# Patient Record
Sex: FEMALE | Race: BLACK OR AFRICAN AMERICAN | NOT HISPANIC OR LATINO | URBAN - METROPOLITAN AREA
[De-identification: names, ages, dates, MRNs, and addresses within clinical notes are randomized per-mention and may not be internally consistent; named-entity substitution may affect disease eponyms.]

---

## 2018-05-03 ENCOUNTER — EMERGENCY (EMERGENCY)
Facility: HOSPITAL | Age: 73
LOS: 1 days | Discharge: HOME | End: 2018-05-03
Attending: EMERGENCY MEDICINE
Payer: MEDICARE

## 2018-05-03 VITALS
HEIGHT: 64 IN | WEIGHT: 195.11 LBS | OXYGEN SATURATION: 98 % | SYSTOLIC BLOOD PRESSURE: 136 MMHG | TEMPERATURE: 98 F | DIASTOLIC BLOOD PRESSURE: 85 MMHG | HEART RATE: 74 BPM | RESPIRATION RATE: 18 BRPM

## 2018-05-03 LAB
ALBUMIN SERPL ELPH-MCNC: 3.3 G/DL — LOW (ref 3.5–5)
ALP SERPL-CCNC: 52 U/L — SIGNIFICANT CHANGE UP (ref 40–120)
ALT FLD-CCNC: 18 U/L DA — SIGNIFICANT CHANGE UP (ref 10–60)
ANION GAP SERPL CALC-SCNC: 5 MMOL/L — SIGNIFICANT CHANGE UP (ref 5–17)
APPEARANCE UR: CLEAR — SIGNIFICANT CHANGE UP
AST SERPL-CCNC: 14 U/L — SIGNIFICANT CHANGE UP (ref 10–40)
BASOPHILS # BLD AUTO: 0.1 K/UL — SIGNIFICANT CHANGE UP (ref 0–0.2)
BASOPHILS NFR BLD AUTO: 1.7 % — SIGNIFICANT CHANGE UP (ref 0–2)
BILIRUB SERPL-MCNC: 0.3 MG/DL — SIGNIFICANT CHANGE UP (ref 0.2–1.2)
BILIRUB UR-MCNC: NEGATIVE — SIGNIFICANT CHANGE UP
BUN SERPL-MCNC: 14 MG/DL — SIGNIFICANT CHANGE UP (ref 7–18)
CALCIUM SERPL-MCNC: 8.3 MG/DL — LOW (ref 8.4–10.5)
CHLORIDE SERPL-SCNC: 110 MMOL/L — HIGH (ref 96–108)
CO2 SERPL-SCNC: 27 MMOL/L — SIGNIFICANT CHANGE UP (ref 22–31)
COLOR SPEC: YELLOW — SIGNIFICANT CHANGE UP
CREAT SERPL-MCNC: 0.85 MG/DL — SIGNIFICANT CHANGE UP (ref 0.5–1.3)
DIFF PNL FLD: NEGATIVE — SIGNIFICANT CHANGE UP
EOSINOPHIL # BLD AUTO: 0.3 K/UL — SIGNIFICANT CHANGE UP (ref 0–0.5)
EOSINOPHIL NFR BLD AUTO: 5.8 % — SIGNIFICANT CHANGE UP (ref 0–6)
GLUCOSE SERPL-MCNC: 99 MG/DL — SIGNIFICANT CHANGE UP (ref 70–99)
GLUCOSE UR QL: NEGATIVE — SIGNIFICANT CHANGE UP
HCT VFR BLD CALC: 36.7 % — SIGNIFICANT CHANGE UP (ref 34.5–45)
HGB BLD-MCNC: 11.4 G/DL — LOW (ref 11.5–15.5)
KETONES UR-MCNC: NEGATIVE — SIGNIFICANT CHANGE UP
LEUKOCYTE ESTERASE UR-ACNC: NEGATIVE — SIGNIFICANT CHANGE UP
LYMPHOCYTES # BLD AUTO: 0.8 K/UL — LOW (ref 1–3.3)
LYMPHOCYTES # BLD AUTO: 18.7 % — SIGNIFICANT CHANGE UP (ref 13–44)
MCHC RBC-ENTMCNC: 29.7 PG — SIGNIFICANT CHANGE UP (ref 27–34)
MCHC RBC-ENTMCNC: 31.2 GM/DL — LOW (ref 32–36)
MCV RBC AUTO: 95.2 FL — SIGNIFICANT CHANGE UP (ref 80–100)
MONOCYTES # BLD AUTO: 0.4 K/UL — SIGNIFICANT CHANGE UP (ref 0–0.9)
MONOCYTES NFR BLD AUTO: 9.4 % — SIGNIFICANT CHANGE UP (ref 2–14)
NEUTROPHILS # BLD AUTO: 2.9 K/UL — SIGNIFICANT CHANGE UP (ref 1.8–7.4)
NEUTROPHILS NFR BLD AUTO: 64.4 % — SIGNIFICANT CHANGE UP (ref 43–77)
NITRITE UR-MCNC: NEGATIVE — SIGNIFICANT CHANGE UP
PH UR: 6 — SIGNIFICANT CHANGE UP (ref 5–8)
PLATELET # BLD AUTO: 310 K/UL — SIGNIFICANT CHANGE UP (ref 150–400)
POTASSIUM SERPL-MCNC: 4.1 MMOL/L — SIGNIFICANT CHANGE UP (ref 3.5–5.3)
POTASSIUM SERPL-SCNC: 4.1 MMOL/L — SIGNIFICANT CHANGE UP (ref 3.5–5.3)
PROT SERPL-MCNC: 7 G/DL — SIGNIFICANT CHANGE UP (ref 6–8.3)
PROT UR-MCNC: NEGATIVE — SIGNIFICANT CHANGE UP
RBC # BLD: 3.85 M/UL — SIGNIFICANT CHANGE UP (ref 3.8–5.2)
RBC # FLD: 13.2 % — SIGNIFICANT CHANGE UP (ref 10.3–14.5)
SODIUM SERPL-SCNC: 142 MMOL/L — SIGNIFICANT CHANGE UP (ref 135–145)
SP GR SPEC: 1.01 — SIGNIFICANT CHANGE UP (ref 1.01–1.02)
UROBILINOGEN FLD QL: NEGATIVE — SIGNIFICANT CHANGE UP
WBC # BLD: 4.5 K/UL — SIGNIFICANT CHANGE UP (ref 3.8–10.5)
WBC # FLD AUTO: 4.5 K/UL — SIGNIFICANT CHANGE UP (ref 3.8–10.5)

## 2018-05-03 PROCEDURE — 96374 THER/PROPH/DIAG INJ IV PUSH: CPT | Mod: XU

## 2018-05-03 PROCEDURE — 99285 EMERGENCY DEPT VISIT HI MDM: CPT | Mod: 25

## 2018-05-03 PROCEDURE — 99284 EMERGENCY DEPT VISIT MOD MDM: CPT | Mod: 25

## 2018-05-03 PROCEDURE — 80053 COMPREHEN METABOLIC PANEL: CPT

## 2018-05-03 PROCEDURE — 74177 CT ABD & PELVIS W/CONTRAST: CPT

## 2018-05-03 PROCEDURE — 85027 COMPLETE CBC AUTOMATED: CPT

## 2018-05-03 PROCEDURE — 81003 URINALYSIS AUTO W/O SCOPE: CPT

## 2018-05-03 PROCEDURE — 74177 CT ABD & PELVIS W/CONTRAST: CPT | Mod: 26

## 2018-05-03 RX ORDER — SODIUM CHLORIDE 9 MG/ML
3 INJECTION INTRAMUSCULAR; INTRAVENOUS; SUBCUTANEOUS ONCE
Qty: 0 | Refills: 0 | Status: COMPLETED | OUTPATIENT
Start: 2018-05-03 | End: 2018-05-03

## 2018-05-03 RX ORDER — MORPHINE SULFATE 50 MG/1
2 CAPSULE, EXTENDED RELEASE ORAL ONCE
Qty: 0 | Refills: 0 | Status: DISCONTINUED | OUTPATIENT
Start: 2018-05-03 | End: 2018-05-03

## 2018-05-03 RX ADMIN — MORPHINE SULFATE 2 MILLIGRAM(S): 50 CAPSULE, EXTENDED RELEASE ORAL at 08:01

## 2018-05-03 RX ADMIN — SODIUM CHLORIDE 3 MILLILITER(S): 9 INJECTION INTRAMUSCULAR; INTRAVENOUS; SUBCUTANEOUS at 07:36

## 2018-05-03 RX ADMIN — MORPHINE SULFATE 2 MILLIGRAM(S): 50 CAPSULE, EXTENDED RELEASE ORAL at 07:37

## 2018-05-03 NOTE — ED PROVIDER NOTE - OBJECTIVE STATEMENT
h/o HTN, arthritis c/o 1 day of rt flank pain throbbing nonradiating worse with movement. Reports similar pain in past with associated diverticulitis on CT. No f/c or change in appetite. no urinary symptoms. No meds taken for pain.

## 2018-05-03 NOTE — ED PROVIDER NOTE - MEDICAL DECISION MAKING DETAILS
rt flank pain with h/o diverticulitis-concern for uti, diverticulitis, musculoskeletal strain-labs, UA, CT a/p reassess

## 2018-09-28 ENCOUNTER — EMERGENCY (EMERGENCY)
Facility: HOSPITAL | Age: 73
LOS: 1 days | Discharge: HOME | End: 2018-09-28
Attending: EMERGENCY MEDICINE
Payer: MEDICARE

## 2018-09-28 VITALS — HEIGHT: 64 IN | WEIGHT: 195.11 LBS

## 2018-09-28 LAB
ALBUMIN SERPL ELPH-MCNC: 3.6 G/DL — SIGNIFICANT CHANGE UP (ref 3.5–5)
ANION GAP SERPL CALC-SCNC: 7 MMOL/L — SIGNIFICANT CHANGE UP (ref 5–17)
APPEARANCE UR: ABNORMAL
APTT BLD: 54.5 SEC — HIGH (ref 27.5–37.4)
BACTERIA # UR AUTO: ABNORMAL /HPF
BASOPHILS # BLD AUTO: 0.1 K/UL — SIGNIFICANT CHANGE UP (ref 0–0.2)
BASOPHILS NFR BLD AUTO: 1.4 % — SIGNIFICANT CHANGE UP (ref 0–2)
BILIRUB UR-MCNC: NEGATIVE — SIGNIFICANT CHANGE UP
BUN SERPL-MCNC: 13 MG/DL — SIGNIFICANT CHANGE UP (ref 7–18)
CALCIUM SERPL-MCNC: 8.7 MG/DL — SIGNIFICANT CHANGE UP (ref 8.4–10.5)
CHLORIDE SERPL-SCNC: 106 MMOL/L — SIGNIFICANT CHANGE UP (ref 96–108)
CO2 SERPL-SCNC: 26 MMOL/L — SIGNIFICANT CHANGE UP (ref 22–31)
COLOR SPEC: YELLOW — SIGNIFICANT CHANGE UP
DIFF PNL FLD: NEGATIVE — SIGNIFICANT CHANGE UP
EOSINOPHIL # BLD AUTO: 0.2 K/UL — SIGNIFICANT CHANGE UP (ref 0–0.5)
EOSINOPHIL NFR BLD AUTO: 2.1 % — SIGNIFICANT CHANGE UP (ref 0–6)
EPI CELLS # UR: ABNORMAL /HPF
GLUCOSE SERPL-MCNC: 109 MG/DL — HIGH (ref 70–99)
GLUCOSE UR QL: NEGATIVE — SIGNIFICANT CHANGE UP
HCT VFR BLD CALC: 36.4 % — SIGNIFICANT CHANGE UP (ref 34.5–45)
HGB BLD-MCNC: 11.8 G/DL — SIGNIFICANT CHANGE UP (ref 11.5–15.5)
INR BLD: 1.16 RATIO — SIGNIFICANT CHANGE UP (ref 0.88–1.16)
KETONES UR-MCNC: NEGATIVE — SIGNIFICANT CHANGE UP
LACTATE SERPL-SCNC: 0.9 MMOL/L — SIGNIFICANT CHANGE UP (ref 0.7–2)
LEUKOCYTE ESTERASE UR-ACNC: ABNORMAL
LYMPHOCYTES # BLD AUTO: 0.9 K/UL — LOW (ref 1–3.3)
LYMPHOCYTES # BLD AUTO: 9.2 % — LOW (ref 13–44)
MCHC RBC-ENTMCNC: 29.5 PG — SIGNIFICANT CHANGE UP (ref 27–34)
MCHC RBC-ENTMCNC: 32.3 GM/DL — SIGNIFICANT CHANGE UP (ref 32–36)
MCV RBC AUTO: 91.5 FL — SIGNIFICANT CHANGE UP (ref 80–100)
MONOCYTES # BLD AUTO: 0.7 K/UL — SIGNIFICANT CHANGE UP (ref 0–0.9)
MONOCYTES NFR BLD AUTO: 8 % — SIGNIFICANT CHANGE UP (ref 2–14)
NEUTROPHILS # BLD AUTO: 7.4 K/UL — SIGNIFICANT CHANGE UP (ref 1.8–7.4)
NEUTROPHILS NFR BLD AUTO: 79.3 % — HIGH (ref 43–77)
NITRITE UR-MCNC: POSITIVE
PH UR: 5 — SIGNIFICANT CHANGE UP (ref 5–8)
PLATELET # BLD AUTO: 324 K/UL — SIGNIFICANT CHANGE UP (ref 150–400)
POTASSIUM SERPL-MCNC: 3.7 MMOL/L — SIGNIFICANT CHANGE UP (ref 3.5–5.3)
POTASSIUM SERPL-SCNC: 3.7 MMOL/L — SIGNIFICANT CHANGE UP (ref 3.5–5.3)
PROT UR-MCNC: NEGATIVE — SIGNIFICANT CHANGE UP
PROTHROM AB SERPL-ACNC: 12.7 SEC — SIGNIFICANT CHANGE UP (ref 9.8–12.7)
RBC # BLD: 3.98 M/UL — SIGNIFICANT CHANGE UP (ref 3.8–5.2)
RBC # FLD: 12.6 % — SIGNIFICANT CHANGE UP (ref 10.3–14.5)
RBC CASTS # UR COMP ASSIST: SIGNIFICANT CHANGE UP /HPF (ref 0–2)
SODIUM SERPL-SCNC: 139 MMOL/L — SIGNIFICANT CHANGE UP (ref 135–145)
SP GR SPEC: 1.01 — SIGNIFICANT CHANGE UP (ref 1.01–1.02)
UROBILINOGEN FLD QL: NEGATIVE — SIGNIFICANT CHANGE UP
WBC # BLD: 9.3 K/UL — SIGNIFICANT CHANGE UP (ref 3.8–10.5)
WBC # FLD AUTO: 9.3 K/UL — SIGNIFICANT CHANGE UP (ref 3.8–10.5)
WBC UR QL: ABNORMAL /HPF (ref 0–5)

## 2018-09-28 PROCEDURE — 99284 EMERGENCY DEPT VISIT MOD MDM: CPT | Mod: 25

## 2018-09-28 RX ORDER — SODIUM CHLORIDE 9 MG/ML
1000 INJECTION INTRAMUSCULAR; INTRAVENOUS; SUBCUTANEOUS ONCE
Qty: 0 | Refills: 0 | Status: COMPLETED | OUTPATIENT
Start: 2018-09-28 | End: 2018-09-28

## 2018-09-28 RX ORDER — ONDANSETRON 8 MG/1
4 TABLET, FILM COATED ORAL ONCE
Qty: 0 | Refills: 0 | Status: COMPLETED | OUTPATIENT
Start: 2018-09-28 | End: 2018-09-28

## 2018-09-28 RX ORDER — MORPHINE SULFATE 50 MG/1
2 CAPSULE, EXTENDED RELEASE ORAL ONCE
Qty: 0 | Refills: 0 | Status: DISCONTINUED | OUTPATIENT
Start: 2018-09-28 | End: 2018-09-28

## 2018-09-28 NOTE — ED PROVIDER NOTE - OBJECTIVE STATEMENT
74 yo F pmh of OA presents with L lumbar back pain, worse with movement, improves with rest, x 2 days, non radiating. Took muscle relaxer with minimal relief. Also c/o a few days of watery non blood diarrhea and lower abd pain that feels like previous episodes of diverticulitis. Denies other acute complaints.

## 2018-09-28 NOTE — ED PROVIDER NOTE - NS ED ROS FT
CONSTITUTIONAL: no fever, no chills   EYES: no visual changes, no eye pain   ENMT: no nasal congestion, no throat pain  CARDIOVASCULAR: no chest pain, no edema, no palpitations   RESPIRATORY: no shortness of breath, no cough   GASTROINTESTINAL: +abdominal pain, +nausea, no vomiting, +diarrhea, no constipation   GENITOURINARY: no dysuria, no frequency  MUSCULOSKELETAL: no joint pains, no myalgias, +back pain   SKIN: no rashes  NEUROLOGICAL: no weakness, no headache, no dizziness, no slurred speech, no syncope   PSYCHIATRIC: no known mental health illness   HEME/LYMPH: no lymphadenopathy      All other ROS negative except as per HPI

## 2018-09-28 NOTE — ED ADULT TRIAGE NOTE - CHIEF COMPLAINT QUOTE
c/o lower abdominal pain x 2 days as per patient she was passing liquid stools x 2 days because she's on liquid diet and after that she has no bm x 2 days

## 2018-09-28 NOTE — ED PROVIDER NOTE - PHYSICAL EXAMINATION
GENERAL: wells appearing, no acute distress   HEAD: atraumatic   EYES: EOMI, pink conjunctiva   ENT: moist oral mucosa   CARDIAC: RRR, no edema, distal pulses present   RESPIRATORY: lungs CTAB, no increased work of breathing   GASTROINTESTINAL: +mild lower abdominal tenderness, no rebound or guarding, bowel sounds presents  GENITOURINARY: no CVA tenderness   MUSCULOSKELETAL: no deformity; L lumbar paraspinal ttp; no midline tenderness or step offs; pain worse with movement   NEUROLOGICAL: AAOx3, CN's II-XII intact, strength 5/5 bilateral UE and LE, sensation intact to light touch, using cane to ambulate    SKIN: intact   PSYCHIATRIC: cooperative  HEME LYMPH: no lymphadenopathy

## 2018-09-28 NOTE — ED PROVIDER NOTE - PROGRESS NOTE DETAILS
CT - uncomplicated diverticulitis  labs - non-contributory   UA - possible UTI (although contaminated specimen)   Pain controlled. Pt ambulatory in ED. Will d/c with GI fu and PO cipro and flagyl. Discussed indications for patient return to ED. Patient understood.

## 2018-09-28 NOTE — ED PROVIDER NOTE - MEDICAL DECISION MAKING DETAILS
72 yo F with hx of diverticulitis p/w lower abd pain w/ diarrhea and with lumbar back pain. Diff dx - diverticulitis, colitis, UTI, MSK back pain. No red flag of back pain. Will check labs, UA, pain control, and CT abd/pelvis.

## 2018-09-29 VITALS
HEART RATE: 75 BPM | SYSTOLIC BLOOD PRESSURE: 112 MMHG | RESPIRATION RATE: 18 BRPM | DIASTOLIC BLOOD PRESSURE: 78 MMHG | TEMPERATURE: 99 F | OXYGEN SATURATION: 99 %

## 2018-09-29 LAB
ALP SERPL-CCNC: 66 U/L — SIGNIFICANT CHANGE UP (ref 40–120)
ALT FLD-CCNC: 17 U/L DA — SIGNIFICANT CHANGE UP (ref 10–60)
AST SERPL-CCNC: 7 U/L — LOW (ref 10–40)
BILIRUB DIRECT SERPL-MCNC: 0.1 MG/DL — SIGNIFICANT CHANGE UP (ref 0–0.2)
BILIRUB INDIRECT FLD-MCNC: 0.5 MG/DL — SIGNIFICANT CHANGE UP (ref 0.2–1)
BILIRUB SERPL-MCNC: 0.6 MG/DL — SIGNIFICANT CHANGE UP (ref 0.2–1.2)
BILIRUB SERPL-MCNC: 0.6 MG/DL — SIGNIFICANT CHANGE UP (ref 0.2–1.2)
CREAT SERPL-MCNC: 0.92 MG/DL — SIGNIFICANT CHANGE UP (ref 0.5–1.3)
LIDOCAIN IGE QN: 84 U/L — SIGNIFICANT CHANGE UP (ref 73–393)
PROT SERPL-MCNC: 8.1 G/DL — SIGNIFICANT CHANGE UP (ref 6–8.3)

## 2018-09-29 PROCEDURE — 36415 COLL VENOUS BLD VENIPUNCTURE: CPT

## 2018-09-29 PROCEDURE — 96375 TX/PRO/DX INJ NEW DRUG ADDON: CPT

## 2018-09-29 PROCEDURE — 96361 HYDRATE IV INFUSION ADD-ON: CPT

## 2018-09-29 PROCEDURE — 87086 URINE CULTURE/COLONY COUNT: CPT

## 2018-09-29 PROCEDURE — 85027 COMPLETE CBC AUTOMATED: CPT

## 2018-09-29 PROCEDURE — 99284 EMERGENCY DEPT VISIT MOD MDM: CPT | Mod: 25

## 2018-09-29 PROCEDURE — 85610 PROTHROMBIN TIME: CPT

## 2018-09-29 PROCEDURE — 82248 BILIRUBIN DIRECT: CPT

## 2018-09-29 PROCEDURE — 86901 BLOOD TYPING SEROLOGIC RH(D): CPT

## 2018-09-29 PROCEDURE — 87186 SC STD MICRODIL/AGAR DIL: CPT

## 2018-09-29 PROCEDURE — 96374 THER/PROPH/DIAG INJ IV PUSH: CPT | Mod: XU

## 2018-09-29 PROCEDURE — 80053 COMPREHEN METABOLIC PANEL: CPT

## 2018-09-29 PROCEDURE — 83605 ASSAY OF LACTIC ACID: CPT

## 2018-09-29 PROCEDURE — 85730 THROMBOPLASTIN TIME PARTIAL: CPT

## 2018-09-29 PROCEDURE — 83690 ASSAY OF LIPASE: CPT

## 2018-09-29 PROCEDURE — 86850 RBC ANTIBODY SCREEN: CPT

## 2018-09-29 PROCEDURE — 86900 BLOOD TYPING SEROLOGIC ABO: CPT

## 2018-09-29 PROCEDURE — 74177 CT ABD & PELVIS W/CONTRAST: CPT

## 2018-09-29 PROCEDURE — 74177 CT ABD & PELVIS W/CONTRAST: CPT | Mod: 26

## 2018-09-29 PROCEDURE — 81001 URINALYSIS AUTO W/SCOPE: CPT

## 2018-09-29 RX ORDER — METRONIDAZOLE 500 MG
1 TABLET ORAL
Qty: 30 | Refills: 0
Start: 2018-09-29 | End: 2018-10-08

## 2018-09-29 RX ORDER — CIPROFLOXACIN LACTATE 400MG/40ML
1 VIAL (ML) INTRAVENOUS
Qty: 20 | Refills: 0
Start: 2018-09-29 | End: 2018-10-08

## 2018-09-29 RX ADMIN — SODIUM CHLORIDE 1000 MILLILITER(S): 9 INJECTION INTRAMUSCULAR; INTRAVENOUS; SUBCUTANEOUS at 00:54

## 2018-09-29 RX ADMIN — MORPHINE SULFATE 2 MILLIGRAM(S): 50 CAPSULE, EXTENDED RELEASE ORAL at 00:53

## 2018-09-29 RX ADMIN — ONDANSETRON 4 MILLIGRAM(S): 8 TABLET, FILM COATED ORAL at 00:53

## 2018-09-29 NOTE — ED ADULT NURSE NOTE - NSIMPLEMENTINTERV_GEN_ALL_ED
Implemented All Universal Safety Interventions:  [place] to call system. Call bell, personal items and telephone within reach. Instruct patient to call for assistance. Room bathroom lighting operational. Non-slip footwear when patient is off stretcher. Physically safe environment: no spills, clutter or unnecessary equipment. Stretcher in lowest position, wheels locked, appropriate side rails in place.

## 2018-09-29 NOTE — ED ADULT NURSE NOTE - OBJECTIVE STATEMENT
Pt states that she is having abd pain for 2 days, and she is passing liquid stool. denies nausea, vomiting fever and chills

## 2019-08-22 ENCOUNTER — EMERGENCY (EMERGENCY)
Facility: HOSPITAL | Age: 74
LOS: 1 days | Discharge: HOME | End: 2019-08-22
Attending: EMERGENCY MEDICINE
Payer: MEDICARE

## 2019-08-22 VITALS
RESPIRATION RATE: 17 BRPM | HEART RATE: 71 BPM | SYSTOLIC BLOOD PRESSURE: 145 MMHG | DIASTOLIC BLOOD PRESSURE: 77 MMHG | OXYGEN SATURATION: 99 % | TEMPERATURE: 98 F

## 2019-08-22 VITALS
OXYGEN SATURATION: 98 % | DIASTOLIC BLOOD PRESSURE: 83 MMHG | HEIGHT: 63 IN | RESPIRATION RATE: 17 BRPM | SYSTOLIC BLOOD PRESSURE: 140 MMHG | WEIGHT: 188.05 LBS | TEMPERATURE: 98 F | HEART RATE: 78 BPM

## 2019-08-22 LAB
ALBUMIN SERPL ELPH-MCNC: 3.7 G/DL — SIGNIFICANT CHANGE UP (ref 3.5–5)
ALP SERPL-CCNC: 68 U/L — SIGNIFICANT CHANGE UP (ref 40–120)
ALT FLD-CCNC: 19 U/L DA — SIGNIFICANT CHANGE UP (ref 10–60)
ANION GAP SERPL CALC-SCNC: 4 MMOL/L — LOW (ref 5–17)
APTT BLD: 46.4 SEC — HIGH (ref 27.5–36.3)
AST SERPL-CCNC: 13 U/L — SIGNIFICANT CHANGE UP (ref 10–40)
BASOPHILS # BLD AUTO: 0.04 K/UL — SIGNIFICANT CHANGE UP (ref 0–0.2)
BASOPHILS NFR BLD AUTO: 0.7 % — SIGNIFICANT CHANGE UP (ref 0–2)
BILIRUB SERPL-MCNC: 0.5 MG/DL — SIGNIFICANT CHANGE UP (ref 0.2–1.2)
BUN SERPL-MCNC: 15 MG/DL — SIGNIFICANT CHANGE UP (ref 7–18)
CALCIUM SERPL-MCNC: 9 MG/DL — SIGNIFICANT CHANGE UP (ref 8.4–10.5)
CHLORIDE SERPL-SCNC: 108 MMOL/L — SIGNIFICANT CHANGE UP (ref 96–108)
CO2 SERPL-SCNC: 31 MMOL/L — SIGNIFICANT CHANGE UP (ref 22–31)
CREAT SERPL-MCNC: 0.85 MG/DL — SIGNIFICANT CHANGE UP (ref 0.5–1.3)
EOSINOPHIL # BLD AUTO: 0.2 K/UL — SIGNIFICANT CHANGE UP (ref 0–0.5)
EOSINOPHIL NFR BLD AUTO: 3.6 % — SIGNIFICANT CHANGE UP (ref 0–6)
GLUCOSE SERPL-MCNC: 94 MG/DL — SIGNIFICANT CHANGE UP (ref 70–99)
HCT VFR BLD CALC: 35.9 % — SIGNIFICANT CHANGE UP (ref 34.5–45)
HGB BLD-MCNC: 11.6 G/DL — SIGNIFICANT CHANGE UP (ref 11.5–15.5)
IMM GRANULOCYTES NFR BLD AUTO: 0.4 % — SIGNIFICANT CHANGE UP (ref 0–1.5)
INR BLD: 1.06 RATIO — SIGNIFICANT CHANGE UP (ref 0.88–1.16)
LACTATE SERPL-SCNC: 0.9 MMOL/L — SIGNIFICANT CHANGE UP (ref 0.7–2)
LYMPHOCYTES # BLD AUTO: 0.96 K/UL — LOW (ref 1–3.3)
LYMPHOCYTES # BLD AUTO: 17.1 % — SIGNIFICANT CHANGE UP (ref 13–44)
MCHC RBC-ENTMCNC: 30 PG — SIGNIFICANT CHANGE UP (ref 27–34)
MCHC RBC-ENTMCNC: 32.3 GM/DL — SIGNIFICANT CHANGE UP (ref 32–36)
MCV RBC AUTO: 92.8 FL — SIGNIFICANT CHANGE UP (ref 80–100)
MONOCYTES # BLD AUTO: 0.52 K/UL — SIGNIFICANT CHANGE UP (ref 0–0.9)
MONOCYTES NFR BLD AUTO: 9.2 % — SIGNIFICANT CHANGE UP (ref 2–14)
NEUTROPHILS # BLD AUTO: 3.89 K/UL — SIGNIFICANT CHANGE UP (ref 1.8–7.4)
NEUTROPHILS NFR BLD AUTO: 69 % — SIGNIFICANT CHANGE UP (ref 43–77)
NRBC # BLD: 0 /100 WBCS — SIGNIFICANT CHANGE UP (ref 0–0)
PLATELET # BLD AUTO: 343 K/UL — SIGNIFICANT CHANGE UP (ref 150–400)
POTASSIUM SERPL-MCNC: 4 MMOL/L — SIGNIFICANT CHANGE UP (ref 3.5–5.3)
POTASSIUM SERPL-SCNC: 4 MMOL/L — SIGNIFICANT CHANGE UP (ref 3.5–5.3)
PROT SERPL-MCNC: 8 G/DL — SIGNIFICANT CHANGE UP (ref 6–8.3)
PROTHROM AB SERPL-ACNC: 11.8 SEC — SIGNIFICANT CHANGE UP (ref 10–12.9)
RBC # BLD: 3.87 M/UL — SIGNIFICANT CHANGE UP (ref 3.8–5.2)
RBC # FLD: 14.1 % — SIGNIFICANT CHANGE UP (ref 10.3–14.5)
SODIUM SERPL-SCNC: 143 MMOL/L — SIGNIFICANT CHANGE UP (ref 135–145)
WBC # BLD: 5.63 K/UL — SIGNIFICANT CHANGE UP (ref 3.8–10.5)
WBC # FLD AUTO: 5.63 K/UL — SIGNIFICANT CHANGE UP (ref 3.8–10.5)

## 2019-08-22 PROCEDURE — 85610 PROTHROMBIN TIME: CPT

## 2019-08-22 PROCEDURE — 86901 BLOOD TYPING SEROLOGIC RH(D): CPT

## 2019-08-22 PROCEDURE — 74174 CTA ABD&PLVS W/CONTRAST: CPT

## 2019-08-22 PROCEDURE — 99284 EMERGENCY DEPT VISIT MOD MDM: CPT | Mod: 25

## 2019-08-22 PROCEDURE — 85027 COMPLETE CBC AUTOMATED: CPT

## 2019-08-22 PROCEDURE — 86850 RBC ANTIBODY SCREEN: CPT

## 2019-08-22 PROCEDURE — 99285 EMERGENCY DEPT VISIT HI MDM: CPT

## 2019-08-22 PROCEDURE — 83605 ASSAY OF LACTIC ACID: CPT

## 2019-08-22 PROCEDURE — 36415 COLL VENOUS BLD VENIPUNCTURE: CPT

## 2019-08-22 PROCEDURE — 85730 THROMBOPLASTIN TIME PARTIAL: CPT

## 2019-08-22 PROCEDURE — 80053 COMPREHEN METABOLIC PANEL: CPT

## 2019-08-22 PROCEDURE — 86900 BLOOD TYPING SEROLOGIC ABO: CPT

## 2019-08-22 PROCEDURE — 74174 CTA ABD&PLVS W/CONTRAST: CPT | Mod: 26

## 2019-08-22 RX ORDER — SODIUM CHLORIDE 9 MG/ML
1000 INJECTION INTRAMUSCULAR; INTRAVENOUS; SUBCUTANEOUS ONCE
Refills: 0 | Status: COMPLETED | OUTPATIENT
Start: 2019-08-22 | End: 2019-08-22

## 2019-08-22 RX ADMIN — SODIUM CHLORIDE 1000 MILLILITER(S): 9 INJECTION INTRAMUSCULAR; INTRAVENOUS; SUBCUTANEOUS at 14:56

## 2019-08-22 NOTE — ED PROVIDER NOTE - OBJECTIVE STATEMENT
73 y/o with PMHx of Arthritis, Cataracts, Diverticulitis and no significant PSHx presenting to ED with sudden onset of rectal bleeding. Pt relates having the urge to pass gas, which prompted her to go to the bathroom. Upon going to the bathroom, pt visualized a small amount of stool and large amount of blood in the toilet bowl. Pt had this occur one more time PTA. Pt denies any other acute complaints. NKDA.

## 2019-08-22 NOTE — ED PROVIDER NOTE - PROGRESS NOTE DETAILS
labs and CT normal. patient has no more episodes of bleeding in Emergency Department. home with GI followup

## 2019-08-23 ENCOUNTER — EMERGENCY (EMERGENCY)
Facility: HOSPITAL | Age: 74
LOS: 1 days | Discharge: HOME | End: 2019-08-23
Attending: EMERGENCY MEDICINE | Admitting: EMERGENCY MEDICINE
Payer: MEDICARE

## 2019-08-23 VITALS
TEMPERATURE: 98 F | HEART RATE: 86 BPM | RESPIRATION RATE: 16 BRPM | SYSTOLIC BLOOD PRESSURE: 151 MMHG | OXYGEN SATURATION: 100 % | DIASTOLIC BLOOD PRESSURE: 69 MMHG

## 2019-08-23 VITALS
DIASTOLIC BLOOD PRESSURE: 79 MMHG | RESPIRATION RATE: 16 BRPM | SYSTOLIC BLOOD PRESSURE: 137 MMHG | OXYGEN SATURATION: 100 % | HEART RATE: 83 BPM

## 2019-08-23 PROCEDURE — 99283 EMERGENCY DEPT VISIT LOW MDM: CPT | Mod: GC

## 2019-08-23 NOTE — ED PROVIDER NOTE - CARE PROVIDER_API CALL
Esdras Rooney (DO)  Gastroenterology; Internal Medicine  [address] [place] [address]  Phone: [phone]  Fax: [phone]  Follow Up Time: 4-6 Days

## 2019-08-23 NOTE — ED ADULT NURSE NOTE - NSIMPLEMENTINTERV_GEN_ALL_ED
Implemented All Universal Safety Interventions:  [place] to call system. Call bell, personal items and telephone within reach. Instruct patient to call for assistance. Room bathroom lighting operational. Non-slip footwear when patient is off stretcher. Physically safe environment: no spills, clutter or unnecessary equipment. Stretcher in lowest position, wheels locked, appropriate side rails in place.

## 2019-08-23 NOTE — ED ADULT TRIAGE NOTE - CHIEF COMPLAINT QUOTE
states had 1 episode of bright red blood rectal bleeding with nausea yesterday morning and another episode of bloody stools this morning. Pt was seen at [facility] yesterday and had tests done. Pt denies taking anticoagulants. Hx diverticulitis

## 2019-08-23 NOTE — ED PROVIDER NOTE - ATTENDING CONTRIBUTION TO CARE
74 Y F with F with hx of diverticulosis here with rectal bleeding x 2 days. Pt states that she went to  ED yesterday for the same issue, had labs and normal CT AP and was DCed home. Pt states that yesterday had 1 episode of bright red blood in the toilet with BM, after this she had several normal BMs without blood. Today she notes that her first BM of the day she was the same bright red blood  in the toilet but then another normal one after that. She denies weakness, LOC, SOB, chest pain. She has never had bleeding like this before, her last colon was 5 or so years ago and she said that it only showed diverticula. She denies abdominal pain, nausea, vomiting, rectal pain. On exam: VSS lungs, heart, pulses, abdomen, neuro, extremities, skin, all normal on exam. repeat H/H 11.6/36.4. VS remain stable, no current bleeding. No need for emergency admission. Urgent timely GI follow up facilitated. Pt will RTER if worse

## 2019-08-23 NOTE — ED ADULT NURSE NOTE - CHIEF COMPLAINT QUOTE
states had 1 episode of bright red blood rectal bleeding with nausea yesterday morning and another episode of bloody stools this morning. Pt was seen at [facility] yesterday and had tests done. Pt denies taking anticoagulants. Hx diverticulitis

## 2019-08-23 NOTE — ED PROVIDER NOTE - CLINICAL SUMMARY MEDICAL DECISION MAKING FREE TEXT BOX
74 Y F with no pmhx not on blood thinners off and on BRBPR x 2 days without symptoms had  ED workup yesterday without findings. Will get repeat blood work, if stable HG likely DC with GI FU and return precautions.

## 2019-08-23 NOTE — ED PROVIDER NOTE - NSFOLLOWUPINSTRUCTIONS_ED_ALL_ED_FT
If you have any severe increase in pain, fever, uncontrollable nausea vomiting, or inability to tolerate eating and drinking you need to come right back to the emergency room.     We will give you a local GI doctors number that sees ER patients very quickly. Call his office today and see if you can get in to see him next week. If you have any severe increase in bleeding, passing out, or feeling weak come right back. We will print all of your results so that you can take them with you to your appointment.

## 2019-08-23 NOTE — ED PROVIDER NOTE - PMH
Was the patient seen in the last year in this department? Yes     Pt was seen 1/29/2018    Does patient have an active prescription for medications requested? No     Received Request Via: Patient     Arthritis    Cataract    Diverticulitis of intestine

## 2019-08-23 NOTE — ED PROVIDER NOTE - OBJECTIVE STATEMENT
74 Y F with F with hx of diverticula here with rectal bleeding x 2 days. Pt states that she went to  ED yesterday for the same issue, had labs and normal CT AP and was DCed home. Pt states that yesterday had 1 episode of bright red blood in the toilet with BM, after this she had several normal BMs without blood. Today she notes that her first BM of the day she was the same bright red blood  in the toilet but then another normal one after that. She denies weakness, LOC, SOB, chest pain. She has never had bleeding like this before, her last colon was 5 or so years ago and she said that it only showed diverticula. She denies abdominal pain, nausea, vomiting, rectal pain. 74 Y F with F with hx of diverticulosis here with rectal bleeding x 2 days. Pt states that she went to  ED yesterday for the same issue, had labs and normal CT AP and was DCed home. Pt states that yesterday had 1 episode of bright red blood in the toilet with BM, after this she had several normal BMs without blood. Today she notes that her first BM of the day she was the same bright red blood  in the toilet but then another normal one after that. She denies weakness, LOC, SOB, chest pain. She has never had bleeding like this before, her last colon was 5 or so years ago and she said that it only showed diverticula. She denies abdominal pain, nausea, vomiting, rectal pain.

## 2019-08-23 NOTE — ED ADULT NURSE NOTE - OBJECTIVE STATEMENT
Pt presenting to room 3 AxOx4 ambulatory at baseline with c/o rectal bleeding x2 days. No significant PMH noted. Pt states she went to [facility] for rectal bleeding, was discharged after CT and blood work came back normal. Pt also c/o "pelvic pain", and an "uneasy feeling" in her abdomen radiating to her throat. On arrival to ED pt's breathing is even and unlabored, pt satting well on RA. Pt appears comfortable, denies CP, SOB, dizziness, lightheadedness, weakness, abdominal pain, N/V, urinary symptoms. No active bleeding noted from rectum at the moment. Skin dry and intact. IV established with 20g in RAC, labs drawn and sent, VSS and as noted, MD at bedside, will continue to monitor.

## 2019-10-16 ENCOUNTER — EMERGENCY (EMERGENCY)
Facility: HOSPITAL | Age: 74
LOS: 1 days | Discharge: HOME | End: 2019-10-16
Attending: EMERGENCY MEDICINE | Admitting: EMERGENCY MEDICINE
Payer: MEDICARE

## 2019-10-16 VITALS
DIASTOLIC BLOOD PRESSURE: 75 MMHG | TEMPERATURE: 98 F | HEART RATE: 70 BPM | RESPIRATION RATE: 16 BRPM | SYSTOLIC BLOOD PRESSURE: 159 MMHG | OXYGEN SATURATION: 98 %

## 2019-10-16 LAB
ANION GAP SERPL CALC-SCNC: 10 MMO/L — SIGNIFICANT CHANGE UP (ref 7–14)
BUN SERPL-MCNC: 11 MG/DL — SIGNIFICANT CHANGE UP (ref 7–23)
CALCIUM SERPL-MCNC: 9.2 MG/DL — SIGNIFICANT CHANGE UP (ref 8.4–10.5)
CHLORIDE SERPL-SCNC: 106 MMOL/L — SIGNIFICANT CHANGE UP (ref 98–107)
CO2 SERPL-SCNC: 26 MMOL/L — SIGNIFICANT CHANGE UP (ref 22–31)
CREAT SERPL-MCNC: 0.78 MG/DL — SIGNIFICANT CHANGE UP (ref 0.5–1.3)
GLUCOSE SERPL-MCNC: 114 MG/DL — HIGH (ref 70–99)
MAGNESIUM SERPL-MCNC: 2 MG/DL — SIGNIFICANT CHANGE UP (ref 1.6–2.6)
PHOSPHATE SERPL-MCNC: 3 MG/DL — SIGNIFICANT CHANGE UP (ref 2.5–4.5)
POTASSIUM SERPL-MCNC: 4.2 MMOL/L — SIGNIFICANT CHANGE UP (ref 3.5–5.3)
POTASSIUM SERPL-SCNC: 4.2 MMOL/L — SIGNIFICANT CHANGE UP (ref 3.5–5.3)
SODIUM SERPL-SCNC: 142 MMOL/L — SIGNIFICANT CHANGE UP (ref 135–145)

## 2019-10-16 PROCEDURE — 99283 EMERGENCY DEPT VISIT LOW MDM: CPT

## 2019-10-16 RX ORDER — ACETAMINOPHEN 500 MG
650 TABLET ORAL ONCE
Refills: 0 | Status: COMPLETED | OUTPATIENT
Start: 2019-10-16 | End: 2019-10-16

## 2019-10-16 RX ORDER — IBUPROFEN 200 MG
600 TABLET ORAL ONCE
Refills: 0 | Status: COMPLETED | OUTPATIENT
Start: 2019-10-16 | End: 2019-10-16

## 2019-10-16 RX ADMIN — Medication 600 MILLIGRAM(S): at 12:40

## 2019-10-16 RX ADMIN — Medication 650 MILLIGRAM(S): at 10:22

## 2019-10-16 RX ADMIN — Medication 600 MILLIGRAM(S): at 11:46

## 2019-10-16 NOTE — ED PROVIDER NOTE - CLINICAL SUMMARY MEDICAL DECISION MAKING FREE TEXT BOX
Pt is a 75 y/o F who presents to the ED with chronic hand tingling with numbness type pain and bilateral shoulder pain, both worse with movement. Possible electrolyte abnormality vs. neuropathic pain. Will get EKG and BMP, give Tylenol. If all studies WNL, will give neuro referral.

## 2019-10-16 NOTE — ED PROVIDER NOTE - PATIENT PORTAL LINK FT
You can access the FollowMyHealth Patient Portal offered by [facility] by registering at the following website: [email or web address]. By joining [facility]’s FollowMyHealth portal, you will also be able to view your health information using other applications (apps) compatible with our system.

## 2019-10-16 NOTE — ED PROVIDER NOTE - OBJECTIVE STATEMENT
Pt is a 75 y/o F with PMHx of diverticulosis who presents to the ED with 6 months of tingling and numbness type pain to her bilateral elbows and fingertips, worse with use of her hands. Also c/o 2 years of B/L shoulder pain, worse with movement. She does have a known left rotator cuff tear. No hx of trauma or falls before symptoms started. No weakness in extremities or tingling in lower extremity. No CP, SOB, or any known cardiac disease.

## 2019-10-16 NOTE — ED PROVIDER NOTE - MUSCULOSKELETAL, MLM
Generalized tenderness and decreased ROM to bilateral shoulders. Tenderness to bilateral fingertips. Normal  strength.

## 2019-10-16 NOTE — ED PROVIDER NOTE - NSFOLLOWUPINSTRUCTIONS_ED_ALL_ED_FT
You are discharged to go home  Please return to the Emergency Room if your symptoms change or worsen    Please follow up with: a neurologist for your symptoms

## 2019-12-23 ENCOUNTER — APPOINTMENT (OUTPATIENT)
Dept: NEUROLOGY | Facility: CLINIC | Age: 74
End: 2019-12-23

## 2020-01-17 ENCOUNTER — APPOINTMENT (OUTPATIENT)
Dept: NEUROLOGY | Facility: CLINIC | Age: 75
End: 2020-01-17

## 2020-02-11 ENCOUNTER — APPOINTMENT (OUTPATIENT)
Dept: NEUROLOGY | Facility: CLINIC | Age: 75
End: 2020-02-11
Payer: MEDICARE

## 2020-02-11 VITALS
HEIGHT: 65 IN | SYSTOLIC BLOOD PRESSURE: 185 MMHG | TEMPERATURE: 98.1 F | RESPIRATION RATE: 17 BRPM | DIASTOLIC BLOOD PRESSURE: 106 MMHG | BODY MASS INDEX: 32.99 KG/M2 | OXYGEN SATURATION: 96 % | HEART RATE: 77 BPM | WEIGHT: 198 LBS

## 2020-02-11 DIAGNOSIS — M54.30 SCIATICA, UNSPECIFIED SIDE: ICD-10-CM

## 2020-02-11 DIAGNOSIS — I65.29 OCCLUSION AND STENOSIS OF UNSPECIFIED CAROTID ARTERY: ICD-10-CM

## 2020-02-11 DIAGNOSIS — R20.0 ANESTHESIA OF SKIN: ICD-10-CM

## 2020-02-11 DIAGNOSIS — G56.00 CARPAL TUNNEL SYNDROME, UNSPECIFIED UPPER LIMB: ICD-10-CM

## 2020-02-11 LAB
ANION GAP SERPL CALC-SCNC: 11 MMOL/L
BUN SERPL-MCNC: 17 MG/DL
CALCIUM SERPL-MCNC: 9.3 MG/DL
CHLORIDE SERPL-SCNC: 106 MMOL/L
CO2 SERPL-SCNC: 27 MMOL/L
CREAT SERPL-MCNC: 0.89 MG/DL
GLUCOSE SERPL-MCNC: 98 MG/DL
POTASSIUM SERPL-SCNC: 4.1 MMOL/L
SODIUM SERPL-SCNC: 144 MMOL/L

## 2020-02-11 PROCEDURE — 99205 OFFICE O/P NEW HI 60 MIN: CPT

## 2020-02-11 NOTE — HISTORY OF PRESENT ILLNESS
[FreeTextEntry1] : Ms. Montanez is a 74 year old woman who reports that in October 2019 she started having bilateral hand numbness. 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 I reviewed all the imaging and there may be mild fusiform enlargement of the right cavernous segment, but it does not appear out of proportion to what would be expected for her age. She was told that she has carpal tunnel syndrome and a pinched nerve causing her numbness, and regardless, the MR angiographic finding is asymptomatic. There also may be stenosis at the cervical petrous junction. She denies any TIA/Stroke symptoms.

## 2020-02-11 NOTE — PHYSICAL EXAM
[General Appearance - Well Developed] : well developed [General Appearance - Alert] : alert [General Appearance - Well Nourished] : well nourished [Affect] : the affect was normal [Oriented To Time, Place, And Person] : oriented to person, place, and time [Place] : oriented to place [Mood] : the mood was normal [Person] : oriented to person [Time] : oriented to time [Concentration Intact] : normal concentrating ability [Short Term Intact] : short term memory intact [Repeating Phrases] : no difficulty repeating a phrase [Visual Intact] : visual attention was ~T not ~L decreased [Naming Objects] : no difficulty naming common objects [Fluency] : fluency intact [Writing A Sentence] : no difficulty writing a sentence [Comprehension] : comprehension intact [Cranial Nerves Optic (II)] : visual acuity intact bilaterally,  visual fields full to confrontation, pupils equal round and reactive to light [Past History] : adequate knowledge of personal past history [Reading] : reading intact [Cranial Nerves Oculomotor (III)] : extraocular motion intact [Cranial Nerves Vestibulocochlear (VIII)] : hearing was intact bilaterally [Cranial Nerves Facial (VII)] : face symmetrical [Cranial Nerves Trigeminal (V)] : facial sensation intact symmetrically [Cranial Nerves Accessory (XI - Cranial And Spinal)] : head turning and shoulder shrug symmetric [Cranial Nerves Glossopharyngeal (IX)] : tongue and palate midline [Cranial Nerves Hypoglossal (XII)] : there was no tongue deviation with protrusion [Motor Tone] : muscle tone was normal in all four extremities [Motor Strength] : muscle strength was normal in all four extremities [Motor Handedness Right-Handed] : the patient is right hand dominant [No Muscle Atrophy] : normal bulk in all four extremities [Balance] : balance was intact [Sensation Tactile Decrease] : light touch was intact [Extraocular Movements] : extraocular movements were intact [Hearing Threshold Finger Rub Not [place]] : hearing was normal [Full Visual Field] : full visual field [Neck Appearance] : the appearance of the neck was normal [] : no respiratory distress [Abdomen Soft] : soft [Heart Rate And Rhythm] : heart rate was normal and rhythm regular [Edema] : there was no peripheral edema [Abnormal Walk] : normal gait [Musculoskeletal - Swelling] : no joint swelling seen [Skin Turgor] : normal skin turgor [Skin Color & Pigmentation] : normal skin color and pigmentation [Paresis Pronator Drift Right-Sided] : no pronator drift on the right [Paresis Pronator Drift Left-Sided] : no pronator drift on the left [Motor Strength Upper Extremities Bilaterally] : strength was normal in both upper extremities [Motor Strength Lower Extremities Bilaterally] : strength was normal in both lower extremities [Dysdiadochokinesia Bilaterally] : not present [Coordination - Dysmetria Impaired Finger-to-Nose Bilateral] : not present

## 2020-02-11 NOTE — REVIEW OF SYSTEMS
[Numbness] : numbness [Fever] : no fever [Chills] : no chills [Feeling Poorly] : not feeling poorly [Feeling Tired] : not feeling tired [Memory Lapses or Loss] : no memory loss [Confused or Disoriented] : no confusion [Decr. Concentrating Ability] : no decrease in concentrating ability [Difficulty with Language] : no ~M difficulty with language [Repeating Questions] : no repeated questioning about recent events [Changed Thought Patterns] : no change in thought patterns [Facial Weakness] : no facial weakness [Arm Weakness] : no arm weakness [Hand Weakness] : no hand weakness [Leg Weakness] : no leg weakness [Poor Coordination] : good coordination [Difficulty Writing] : no difficulty writing [Tingling] : no tingling [Seizures] : no convulsions [Dizziness] : no dizziness [Fainting] : no fainting [Lightheadedness] : no lightheadedness [Vertigo] : no vertigo [Tension Headache] : no tension-type headache [Difficulty Walking] : no difficulty walking [Inability to Walk] : able to walk [Ataxia] : no ataxia [Frequent Falls] : not falling [Anxiety] : no anxiety [Depression] : no depression [Eye Pain] : no eye pain [Red Eyes] : eyes not red [Earache] : no earache [Loss Of Hearing] : no hearing loss [Chest Pain] : no chest pain [Palpitations] : no palpitations [Shortness Of Breath] : no shortness of breath [Wheezing] : no wheezing [Cough] : no cough [Vomiting] : no vomiting [Joint Pain] : no joint pain [Skin Wound] : no skin wound [Skin Lesions] : no skin lesions [Easy Bleeding] : no tendency for easy bleeding [Easy Bruising] : no tendency for easy bruising

## 2020-02-11 NOTE — DISCUSSION/SUMMARY
[FreeTextEntry1] : Ms. Montanez is a 74 year old woman who reports in October 2019 she started having bilateral hand numbness. 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 Plan for a CTA head and neck to better visualize the fusiform enlargement of the right cavernous segment and the possible stenosis at the cervical petrous junction. She will continue to follow with LOUANN Crespo for the hand numbness. I will see her again after the imaging. All of her questions and concerns were addressed.

## 2020-02-11 NOTE — CONSULT LETTER
[Consult Letter:] : I had the pleasure of evaluating your patient, [unfilled]. [( Thank you for referring [unfilled] for consultation for _____ )] : Thank you for referring [unfilled] for consultation for [unfilled] [Dear  ___] : Dear ~JUAREZ, [Please see my note below.] : Please see my note below. [Sincerely,] : Sincerely, [Consult Closing:] : Thank you very much for allowing me to participate in the care of this patient.  If you have any questions, please do not hesitate to contact me. [FreeTextEntry3] : Payam James MD\par Chief, Vascular Neurology\par  of Neurology and Radiology\par [facility] [employer] [facility]\par Director, Comprehensive Stroke Center and Stroke Unit\par [facility]\par Attending, Interventional Neuroradiology\par \par

## 2021-07-25 ENCOUNTER — EMERGENCY (EMERGENCY)
Facility: HOSPITAL | Age: 76
LOS: 1 days | Discharge: HOME | End: 2021-07-25
Attending: STUDENT IN AN ORGANIZED HEALTH CARE EDUCATION/TRAINING PROGRAM
Payer: MEDICARE

## 2021-07-25 VITALS
HEART RATE: 58 BPM | RESPIRATION RATE: 18 BRPM | SYSTOLIC BLOOD PRESSURE: 133 MMHG | DIASTOLIC BLOOD PRESSURE: 87 MMHG | TEMPERATURE: 98 F | OXYGEN SATURATION: 98 %

## 2021-07-25 VITALS
WEIGHT: 185.19 LBS | HEIGHT: 63 IN | HEART RATE: 70 BPM | OXYGEN SATURATION: 98 % | DIASTOLIC BLOOD PRESSURE: 82 MMHG | RESPIRATION RATE: 18 BRPM | TEMPERATURE: 98 F | SYSTOLIC BLOOD PRESSURE: 161 MMHG

## 2021-07-25 LAB
ALBUMIN SERPL ELPH-MCNC: 3.6 G/DL — SIGNIFICANT CHANGE UP (ref 3.5–5)
ALP SERPL-CCNC: 62 U/L — SIGNIFICANT CHANGE UP (ref 40–120)
ALT FLD-CCNC: 22 U/L DA — SIGNIFICANT CHANGE UP (ref 10–60)
ANION GAP SERPL CALC-SCNC: 8 MMOL/L — SIGNIFICANT CHANGE UP (ref 5–17)
APPEARANCE UR: CLEAR — SIGNIFICANT CHANGE UP
AST SERPL-CCNC: 22 U/L — SIGNIFICANT CHANGE UP (ref 10–40)
BACTERIA # UR AUTO: ABNORMAL /HPF
BASOPHILS # BLD AUTO: 0.04 K/UL — SIGNIFICANT CHANGE UP (ref 0–0.2)
BASOPHILS NFR BLD AUTO: 0.7 % — SIGNIFICANT CHANGE UP (ref 0–2)
BILIRUB SERPL-MCNC: 0.8 MG/DL — SIGNIFICANT CHANGE UP (ref 0.2–1.2)
BILIRUB UR-MCNC: NEGATIVE — SIGNIFICANT CHANGE UP
BUN SERPL-MCNC: 15 MG/DL — SIGNIFICANT CHANGE UP (ref 7–18)
CALCIUM SERPL-MCNC: 8.9 MG/DL — SIGNIFICANT CHANGE UP (ref 8.4–10.5)
CHLORIDE SERPL-SCNC: 110 MMOL/L — HIGH (ref 96–108)
CO2 SERPL-SCNC: 25 MMOL/L — SIGNIFICANT CHANGE UP (ref 22–31)
COLOR SPEC: YELLOW — SIGNIFICANT CHANGE UP
CREAT SERPL-MCNC: 0.86 MG/DL — SIGNIFICANT CHANGE UP (ref 0.5–1.3)
DIFF PNL FLD: NEGATIVE — SIGNIFICANT CHANGE UP
EOSINOPHIL # BLD AUTO: 0.14 K/UL — SIGNIFICANT CHANGE UP (ref 0–0.5)
EOSINOPHIL NFR BLD AUTO: 2.4 % — SIGNIFICANT CHANGE UP (ref 0–6)
EPI CELLS # UR: SIGNIFICANT CHANGE UP /HPF
GLUCOSE SERPL-MCNC: 86 MG/DL — SIGNIFICANT CHANGE UP (ref 70–99)
GLUCOSE UR QL: NEGATIVE — SIGNIFICANT CHANGE UP
HCT VFR BLD CALC: 36.2 % — SIGNIFICANT CHANGE UP (ref 34.5–45)
HGB BLD-MCNC: 11.9 G/DL — SIGNIFICANT CHANGE UP (ref 11.5–15.5)
IMM GRANULOCYTES NFR BLD AUTO: 0.2 % — SIGNIFICANT CHANGE UP (ref 0–1.5)
KETONES UR-MCNC: ABNORMAL
LEUKOCYTE ESTERASE UR-ACNC: ABNORMAL
LYMPHOCYTES # BLD AUTO: 0.87 K/UL — LOW (ref 1–3.3)
LYMPHOCYTES # BLD AUTO: 15.2 % — SIGNIFICANT CHANGE UP (ref 13–44)
MCHC RBC-ENTMCNC: 29.6 PG — SIGNIFICANT CHANGE UP (ref 27–34)
MCHC RBC-ENTMCNC: 32.9 GM/DL — SIGNIFICANT CHANGE UP (ref 32–36)
MCV RBC AUTO: 90 FL — SIGNIFICANT CHANGE UP (ref 80–100)
MONOCYTES # BLD AUTO: 0.61 K/UL — SIGNIFICANT CHANGE UP (ref 0–0.9)
MONOCYTES NFR BLD AUTO: 10.6 % — SIGNIFICANT CHANGE UP (ref 2–14)
NEUTROPHILS # BLD AUTO: 4.06 K/UL — SIGNIFICANT CHANGE UP (ref 1.8–7.4)
NEUTROPHILS NFR BLD AUTO: 70.9 % — SIGNIFICANT CHANGE UP (ref 43–77)
NITRITE UR-MCNC: NEGATIVE — SIGNIFICANT CHANGE UP
NRBC # BLD: 0 /100 WBCS — SIGNIFICANT CHANGE UP (ref 0–0)
PH UR: 6 — SIGNIFICANT CHANGE UP (ref 5–8)
PLATELET # BLD AUTO: 353 K/UL — SIGNIFICANT CHANGE UP (ref 150–400)
POTASSIUM SERPL-MCNC: 4.3 MMOL/L — SIGNIFICANT CHANGE UP (ref 3.5–5.3)
POTASSIUM SERPL-SCNC: 4.3 MMOL/L — SIGNIFICANT CHANGE UP (ref 3.5–5.3)
PROT SERPL-MCNC: 8 G/DL — SIGNIFICANT CHANGE UP (ref 6–8.3)
PROT UR-MCNC: NEGATIVE — SIGNIFICANT CHANGE UP
RBC # BLD: 4.02 M/UL — SIGNIFICANT CHANGE UP (ref 3.8–5.2)
RBC # FLD: 13.8 % — SIGNIFICANT CHANGE UP (ref 10.3–14.5)
RBC CASTS # UR COMP ASSIST: SIGNIFICANT CHANGE UP /HPF (ref 0–2)
SODIUM SERPL-SCNC: 143 MMOL/L — SIGNIFICANT CHANGE UP (ref 135–145)
SP GR SPEC: 1.01 — SIGNIFICANT CHANGE UP (ref 1.01–1.02)
UROBILINOGEN FLD QL: NEGATIVE — SIGNIFICANT CHANGE UP
WBC # BLD: 5.73 K/UL — SIGNIFICANT CHANGE UP (ref 3.8–10.5)
WBC # FLD AUTO: 5.73 K/UL — SIGNIFICANT CHANGE UP (ref 3.8–10.5)
WBC UR QL: SIGNIFICANT CHANGE UP /HPF (ref 0–5)

## 2021-07-25 PROCEDURE — 99284 EMERGENCY DEPT VISIT MOD MDM: CPT

## 2021-07-25 RX ORDER — MORPHINE SULFATE 50 MG/1
2 CAPSULE, EXTENDED RELEASE ORAL ONCE
Refills: 0 | Status: DISCONTINUED | OUTPATIENT
Start: 2021-07-25 | End: 2021-07-25

## 2021-07-25 RX ORDER — KETOROLAC TROMETHAMINE 30 MG/ML
15 SYRINGE (ML) INJECTION ONCE
Refills: 0 | Status: DISCONTINUED | OUTPATIENT
Start: 2021-07-25 | End: 2021-07-25

## 2021-07-25 RX ORDER — SODIUM CHLORIDE 9 MG/ML
1000 INJECTION INTRAMUSCULAR; INTRAVENOUS; SUBCUTANEOUS ONCE
Refills: 0 | Status: COMPLETED | OUTPATIENT
Start: 2021-07-25 | End: 2021-07-25

## 2021-07-25 RX ADMIN — SODIUM CHLORIDE 1000 MILLILITER(S): 9 INJECTION INTRAMUSCULAR; INTRAVENOUS; SUBCUTANEOUS at 22:24

## 2021-07-25 RX ADMIN — Medication 15 MILLIGRAM(S): at 22:24

## 2021-07-25 RX ADMIN — MORPHINE SULFATE 2 MILLIGRAM(S): 50 CAPSULE, EXTENDED RELEASE ORAL at 22:24

## 2021-07-25 NOTE — ED PROVIDER NOTE - NSFOLLOWUPINSTRUCTIONS_ED_ALL_ED_FT
Return to ER immediately if your abdominal pain does not improve, worsens, or persists, if you have fever, vomiting,  blood in stools or you have black stools, unable to eat or drink, have worsening/persistent diarrhea or constipation, any concerns. See your doctor as soon as possible (within 1-2 days).   If you need further assistance for appointments you can contact the [facility] Care Coordinator at [phone]. In addition our outpatient [facility] is located at [address] [place] [address], tele: [phone].

## 2021-07-25 NOTE — ED PROVIDER NOTE - PROGRESS NOTE DETAILS
[place] DO: Received sign out from Dr. Stout, f/u CT if no acute pathology can dc with f/u. MPRESSION:  No acute findings on CT of the abdomen and pelvis to explain patient's clinical symptoms.  Pancolonic diverticulosis without evidence of diverticulitis.  Pt is well appearing, no guarding to repeat abdominal palpation, able to eat and drink without vomiting. Pt will f/u with GI-contact provided.   Pt is well appearing, has no new complaints and able to walk with normal gait. Pt is stable for discharge and follow up with medical doctor. Pt educated on care and need for follow up. Discussed anticipatory guidance and return precautions. Questions answered. I had a detailed discussion with the patient and/or guardian regarding the historical points, exam findings, and any diagnostic results supporting the discharge diagnosis.

## 2021-07-25 NOTE — ED PROVIDER NOTE - PATIENT PORTAL LINK FT
You can access the FollowMyHealth Patient Portal offered by [facility] by registering at the following website: [email or web address]. By joining [facility]’s FollowMyHealth portal, you will also be able to view your health information using other applications (apps) compatible with our system.

## 2021-07-25 NOTE — ED PROVIDER NOTE - CLINICAL SUMMARY MEDICAL DECISION MAKING FREE TEXT BOX
77 y/o F presents with R flank wrapping around to abdomen pain. Kidney stones vs pyelo. No RUE tenderness, negative Diaz's, and no rash. Will perform labs, urine and reassess.

## 2021-07-25 NOTE — ED PROVIDER NOTE - NSFOLLOWUPCLINICS_GEN_ALL_ED_FT
[facility]  Gastroenterology  [address] [place] [address]  Phone: [phone]  Fax: [phone]    [facility]  Internal Medicine  [address] [place] [address]  Phone: [phone]  Fax: [phone]

## 2021-07-25 NOTE — ED PROVIDER NOTE - PHYSICAL EXAMINATION
Sohrawardy:   VS reviewed  NAD, not ill-appearing  aaox3  nc/at, neck rom normal, supple  rrr, s1s2, no jvd, no pitting edema  normal resp effort  lungs ctab, no w/r/r, no CVA tenderness  abdomen soft, nd/nt   no rash  cn intact, strength 5/5, sensation intact, no neuro deficits

## 2021-07-25 NOTE — ED PROVIDER NOTE - NS ED ROS FT
(+) flank pain, abdominal pain  (-) fevers, chills  (-) dizziness, lightheadedness, vision changes  (-) neck pain, stiffness  (-) cp, palpitations  (-) sob, cough, hemoptysis  (-) n/v/d/c   (-) urinary sxs, back pain  (-) weakness, paresthesias

## 2021-07-25 NOTE — ED PROVIDER NOTE - OBJECTIVE STATEMENT
77 y/o F with a significant PMHx of HTN presents to the ED with R sided pain. Patient is endorsing R flank wrapping around to the abdomen aching, intermittently sharp pain starting this morning. Pain not associated with movement, eating or urinating. No history of kidney stones. Denies fever, chills, nausea, vomiting, chest pain, shortness of breath or any other acute complaints.

## 2021-07-26 PROCEDURE — 96375 TX/PRO/DX INJ NEW DRUG ADDON: CPT

## 2021-07-26 PROCEDURE — 96374 THER/PROPH/DIAG INJ IV PUSH: CPT | Mod: XU

## 2021-07-26 PROCEDURE — 74177 CT ABD & PELVIS W/CONTRAST: CPT | Mod: 26,MA

## 2021-07-26 PROCEDURE — 74177 CT ABD & PELVIS W/CONTRAST: CPT | Mod: MA

## 2021-07-26 PROCEDURE — 99284 EMERGENCY DEPT VISIT MOD MDM: CPT | Mod: 25

## 2021-07-26 PROCEDURE — 85025 COMPLETE CBC W/AUTO DIFF WBC: CPT

## 2021-07-26 PROCEDURE — 36415 COLL VENOUS BLD VENIPUNCTURE: CPT

## 2021-07-26 PROCEDURE — 80053 COMPREHEN METABOLIC PANEL: CPT

## 2021-07-26 PROCEDURE — 81001 URINALYSIS AUTO W/SCOPE: CPT

## 2021-07-26 NOTE — ED ADULT NURSE NOTE - NSFALLRSKHARMRISK_ED_ALL_ED
Anesthesia reports 70 mg Propofol, 80 mg Lidocaine and 350 mL NS given during procedure. Received report from anesthesia staff on vital signs and status of patient. no

## 2021-07-28 ENCOUNTER — EMERGENCY (EMERGENCY)
Facility: HOSPITAL | Age: 76
LOS: 1 days | Discharge: HOME | End: 2021-07-28
Attending: STUDENT IN AN ORGANIZED HEALTH CARE EDUCATION/TRAINING PROGRAM
Payer: MEDICARE

## 2021-07-28 VITALS
WEIGHT: 189.6 LBS | HEART RATE: 83 BPM | TEMPERATURE: 98 F | RESPIRATION RATE: 18 BRPM | HEIGHT: 63 IN | DIASTOLIC BLOOD PRESSURE: 88 MMHG | SYSTOLIC BLOOD PRESSURE: 145 MMHG

## 2021-07-28 VITALS
OXYGEN SATURATION: 97 % | DIASTOLIC BLOOD PRESSURE: 74 MMHG | RESPIRATION RATE: 18 BRPM | HEART RATE: 69 BPM | SYSTOLIC BLOOD PRESSURE: 139 MMHG | TEMPERATURE: 98 F

## 2021-07-28 LAB
ALBUMIN SERPL ELPH-MCNC: 3.5 G/DL — SIGNIFICANT CHANGE UP (ref 3.5–5)
ALP SERPL-CCNC: 60 U/L — SIGNIFICANT CHANGE UP (ref 40–120)
ALT FLD-CCNC: 21 U/L DA — SIGNIFICANT CHANGE UP (ref 10–60)
ANION GAP SERPL CALC-SCNC: 5 MMOL/L — SIGNIFICANT CHANGE UP (ref 5–17)
APPEARANCE UR: CLEAR — SIGNIFICANT CHANGE UP
AST SERPL-CCNC: 14 U/L — SIGNIFICANT CHANGE UP (ref 10–40)
BASOPHILS # BLD AUTO: 0.04 K/UL — SIGNIFICANT CHANGE UP (ref 0–0.2)
BASOPHILS NFR BLD AUTO: 0.6 % — SIGNIFICANT CHANGE UP (ref 0–2)
BILIRUB SERPL-MCNC: 0.3 MG/DL — SIGNIFICANT CHANGE UP (ref 0.2–1.2)
BILIRUB UR-MCNC: NEGATIVE — SIGNIFICANT CHANGE UP
BUN SERPL-MCNC: 17 MG/DL — SIGNIFICANT CHANGE UP (ref 7–18)
CALCIUM SERPL-MCNC: 8.4 MG/DL — SIGNIFICANT CHANGE UP (ref 8.4–10.5)
CHLORIDE SERPL-SCNC: 112 MMOL/L — HIGH (ref 96–108)
CO2 SERPL-SCNC: 28 MMOL/L — SIGNIFICANT CHANGE UP (ref 22–31)
COLOR SPEC: YELLOW — SIGNIFICANT CHANGE UP
CREAT SERPL-MCNC: 0.79 MG/DL — SIGNIFICANT CHANGE UP (ref 0.5–1.3)
DIFF PNL FLD: NEGATIVE — SIGNIFICANT CHANGE UP
EOSINOPHIL # BLD AUTO: 0.31 K/UL — SIGNIFICANT CHANGE UP (ref 0–0.5)
EOSINOPHIL NFR BLD AUTO: 4.7 % — SIGNIFICANT CHANGE UP (ref 0–6)
EPI CELLS # UR: SIGNIFICANT CHANGE UP /HPF
GLUCOSE SERPL-MCNC: 94 MG/DL — SIGNIFICANT CHANGE UP (ref 70–99)
GLUCOSE UR QL: NEGATIVE — SIGNIFICANT CHANGE UP
HCT VFR BLD CALC: 34.6 % — SIGNIFICANT CHANGE UP (ref 34.5–45)
HGB BLD-MCNC: 11.3 G/DL — LOW (ref 11.5–15.5)
IMM GRANULOCYTES NFR BLD AUTO: 0.2 % — SIGNIFICANT CHANGE UP (ref 0–1.5)
KETONES UR-MCNC: NEGATIVE — SIGNIFICANT CHANGE UP
LEUKOCYTE ESTERASE UR-ACNC: NEGATIVE — SIGNIFICANT CHANGE UP
LIDOCAIN IGE QN: 96 U/L — SIGNIFICANT CHANGE UP (ref 73–393)
LYMPHOCYTES # BLD AUTO: 0.97 K/UL — LOW (ref 1–3.3)
LYMPHOCYTES # BLD AUTO: 14.6 % — SIGNIFICANT CHANGE UP (ref 13–44)
MAGNESIUM SERPL-MCNC: 2.2 MG/DL — SIGNIFICANT CHANGE UP (ref 1.6–2.6)
MCHC RBC-ENTMCNC: 29.7 PG — SIGNIFICANT CHANGE UP (ref 27–34)
MCHC RBC-ENTMCNC: 32.7 GM/DL — SIGNIFICANT CHANGE UP (ref 32–36)
MCV RBC AUTO: 90.8 FL — SIGNIFICANT CHANGE UP (ref 80–100)
MONOCYTES # BLD AUTO: 0.63 K/UL — SIGNIFICANT CHANGE UP (ref 0–0.9)
MONOCYTES NFR BLD AUTO: 9.5 % — SIGNIFICANT CHANGE UP (ref 2–14)
NEUTROPHILS # BLD AUTO: 4.68 K/UL — SIGNIFICANT CHANGE UP (ref 1.8–7.4)
NEUTROPHILS NFR BLD AUTO: 70.4 % — SIGNIFICANT CHANGE UP (ref 43–77)
NITRITE UR-MCNC: NEGATIVE — SIGNIFICANT CHANGE UP
NRBC # BLD: 0 /100 WBCS — SIGNIFICANT CHANGE UP (ref 0–0)
PH UR: 5 — SIGNIFICANT CHANGE UP (ref 5–8)
PLATELET # BLD AUTO: 352 K/UL — SIGNIFICANT CHANGE UP (ref 150–400)
POTASSIUM SERPL-MCNC: 4.2 MMOL/L — SIGNIFICANT CHANGE UP (ref 3.5–5.3)
POTASSIUM SERPL-SCNC: 4.2 MMOL/L — SIGNIFICANT CHANGE UP (ref 3.5–5.3)
PROT SERPL-MCNC: 7.6 G/DL — SIGNIFICANT CHANGE UP (ref 6–8.3)
PROT UR-MCNC: NEGATIVE — SIGNIFICANT CHANGE UP
RBC # BLD: 3.81 M/UL — SIGNIFICANT CHANGE UP (ref 3.8–5.2)
RBC # FLD: 13.9 % — SIGNIFICANT CHANGE UP (ref 10.3–14.5)
RBC CASTS # UR COMP ASSIST: SIGNIFICANT CHANGE UP /HPF (ref 0–2)
SODIUM SERPL-SCNC: 145 MMOL/L — SIGNIFICANT CHANGE UP (ref 135–145)
SP GR SPEC: 1.01 — SIGNIFICANT CHANGE UP (ref 1.01–1.02)
UROBILINOGEN FLD QL: NEGATIVE — SIGNIFICANT CHANGE UP
WBC # BLD: 6.64 K/UL — SIGNIFICANT CHANGE UP (ref 3.8–10.5)
WBC # FLD AUTO: 6.64 K/UL — SIGNIFICANT CHANGE UP (ref 3.8–10.5)
WBC UR QL: SIGNIFICANT CHANGE UP /HPF (ref 0–5)

## 2021-07-28 PROCEDURE — 70496 CT ANGIOGRAPHY HEAD: CPT | Mod: MA

## 2021-07-28 PROCEDURE — 70496 CT ANGIOGRAPHY HEAD: CPT | Mod: 26,MA

## 2021-07-28 PROCEDURE — 96374 THER/PROPH/DIAG INJ IV PUSH: CPT

## 2021-07-28 PROCEDURE — 70498 CT ANGIOGRAPHY NECK: CPT | Mod: MA

## 2021-07-28 PROCEDURE — 83735 ASSAY OF MAGNESIUM: CPT

## 2021-07-28 PROCEDURE — 36415 COLL VENOUS BLD VENIPUNCTURE: CPT

## 2021-07-28 PROCEDURE — 81001 URINALYSIS AUTO W/SCOPE: CPT

## 2021-07-28 PROCEDURE — 80053 COMPREHEN METABOLIC PANEL: CPT

## 2021-07-28 PROCEDURE — 96375 TX/PRO/DX INJ NEW DRUG ADDON: CPT

## 2021-07-28 PROCEDURE — 83690 ASSAY OF LIPASE: CPT

## 2021-07-28 PROCEDURE — 99284 EMERGENCY DEPT VISIT MOD MDM: CPT | Mod: 25

## 2021-07-28 PROCEDURE — 87086 URINE CULTURE/COLONY COUNT: CPT

## 2021-07-28 PROCEDURE — 85025 COMPLETE CBC W/AUTO DIFF WBC: CPT

## 2021-07-28 PROCEDURE — 99285 EMERGENCY DEPT VISIT HI MDM: CPT

## 2021-07-28 PROCEDURE — 70498 CT ANGIOGRAPHY NECK: CPT | Mod: 26,MA

## 2021-07-28 RX ORDER — METOCLOPRAMIDE HCL 10 MG
10 TABLET ORAL ONCE
Refills: 0 | Status: COMPLETED | OUTPATIENT
Start: 2021-07-28 | End: 2021-07-28

## 2021-07-28 RX ORDER — KETOROLAC TROMETHAMINE 30 MG/ML
30 SYRINGE (ML) INJECTION ONCE
Refills: 0 | Status: DISCONTINUED | OUTPATIENT
Start: 2021-07-28 | End: 2021-07-28

## 2021-07-28 RX ORDER — SODIUM CHLORIDE 9 MG/ML
1000 INJECTION INTRAMUSCULAR; INTRAVENOUS; SUBCUTANEOUS ONCE
Refills: 0 | Status: COMPLETED | OUTPATIENT
Start: 2021-07-28 | End: 2021-07-28

## 2021-07-28 RX ORDER — ACETAMINOPHEN 500 MG
975 TABLET ORAL ONCE
Refills: 0 | Status: COMPLETED | OUTPATIENT
Start: 2021-07-28 | End: 2021-07-28

## 2021-07-28 RX ADMIN — Medication 10 MILLIGRAM(S): at 19:41

## 2021-07-28 RX ADMIN — Medication 30 MILLIGRAM(S): at 21:01

## 2021-07-28 RX ADMIN — SODIUM CHLORIDE 1000 MILLILITER(S): 9 INJECTION INTRAMUSCULAR; INTRAVENOUS; SUBCUTANEOUS at 19:43

## 2021-07-28 RX ADMIN — Medication 975 MILLIGRAM(S): at 19:41

## 2021-07-28 RX ADMIN — Medication 975 MILLIGRAM(S): at 21:44

## 2021-07-28 NOTE — ED ADULT NURSE NOTE - NSIMPLEMENTINTERV_GEN_ALL_ED
Implemented All Fall Risk Interventions:  [place] to call system. Call bell, personal items and telephone within reach. Instruct patient to call for assistance. Room bathroom lighting operational. Non-slip footwear when patient is off stretcher. Physically safe environment: no spills, clutter or unnecessary equipment. Stretcher in lowest position, wheels locked, appropriate side rails in place. Provide visual cue, wrist band, yellow gown, etc. Monitor gait and stability. Monitor for mental status changes and reorient to person, place, and time. Review medications for side effects contributing to fall risk. Reinforce activity limits and safety measures with patient and family.

## 2021-07-28 NOTE — ED PROVIDER NOTE - OBJECTIVE STATEMENT
76F, pmh of htn, presenting with three days of headache. reports she was recently seen in the ED for abdominal pain which has returned, but went to her pcp today because he pounding headache. pcp concerned for aneurysm. no family history of brain aneurysms. no nausea, vomiting, chest pain, shortness of breath or difficulty walking.

## 2021-07-28 NOTE — ED PROVIDER NOTE - CLINICAL SUMMARY MEDICAL DECISION MAKING FREE TEXT BOX
76F presenting with headache. non-focal neuro exam. recently seen for abdominal pain with normal work up. will get labs and CTA head/neck. will reassess.

## 2021-07-28 NOTE — ED PROVIDER NOTE - NSFOLLOWUPINSTRUCTIONS_ED_ALL_ED_FT
You were seen in the emergency department for headache.     Please follow-up with your primary care doctor in the next 24-48 hours.     Please take Tylenol and Ibuprofen as prescribed on the bottles for pain control.     If you have any worsening symptoms, severe headache, nausea, vomiting, chest pain or shortness of breath, please return to the emergency department.

## 2021-07-28 NOTE — ED ADULT TRIAGE NOTE - CHIEF COMPLAINT QUOTE
" I have the back pain radiating to the front and throbbing headache after the first dose of Covid vaccine "

## 2021-07-28 NOTE — ED PROVIDER NOTE - PATIENT PORTAL LINK FT
You can access the FollowMyHealth Patient Portal offered by [facility] by registering at the following website: [email or web address]. By joining [employer]’s FollowMyHealth portal, you will also be able to view your health information using other applications (apps) compatible with our system.

## 2021-07-28 NOTE — ED ADULT NURSE NOTE - OBJECTIVE STATEMENT
Patient presents to ED with c/o abdominal pain radiating to back and headache associated with dizziness. patient denies blurry vision, no weakness c/o pain 8/10. Patient reports abdominal pain since sunday, seen in ED, headache since Monday.

## 2021-07-28 NOTE — ED PROVIDER NOTE - PHYSICAL EXAMINATION
General: well appearing female, no acute distress   HEENT: normocephalic, atraumatic, EOMI,   Respiratory: normal work of breathing, lungs clear to auscultation bilaterally   Cardiac: regular rate and rhythm   Abdomen: soft, non-tender, no guarding or rebound   MSK: no swelling or tenderness of lower extremities, moving all extremities spontaneously   Skin: warm, dry   Neuro: A&Ox3, cranial nerves II-XII intact, 5/5 strength in all extremities, no sensory deficits   Psych: appropriate affect

## 2021-07-30 LAB
CULTURE RESULTS: SIGNIFICANT CHANGE UP
SPECIMEN SOURCE: SIGNIFICANT CHANGE UP

## 2021-12-29 ENCOUNTER — INPATIENT (INPATIENT)
Facility: HOSPITAL | Age: 76
LOS: 7 days | Discharge: NURSING, REHAB OR LONG-TERM CARE | DRG: 177 | End: 2022-01-06
Attending: HOSPITALIST | Admitting: HOSPITALIST
Payer: MEDICARE

## 2021-12-29 VITALS
SYSTOLIC BLOOD PRESSURE: 96 MMHG | OXYGEN SATURATION: 98 % | TEMPERATURE: 100 F | WEIGHT: 184.97 LBS | HEIGHT: 63 IN | DIASTOLIC BLOOD PRESSURE: 59 MMHG | RESPIRATION RATE: 20 BRPM | HEART RATE: 116 BPM

## 2021-12-29 DIAGNOSIS — U07.1 COVID-19: ICD-10-CM

## 2021-12-29 DIAGNOSIS — Z29.9 ENCOUNTER FOR PROPHYLACTIC MEASURES, UNSPECIFIED: ICD-10-CM

## 2021-12-29 DIAGNOSIS — N17.9 ACUTE KIDNEY FAILURE, UNSPECIFIED: ICD-10-CM

## 2021-12-29 LAB
ALBUMIN SERPL ELPH-MCNC: 3 G/DL — LOW (ref 3.5–5)
ALP SERPL-CCNC: 61 U/L — SIGNIFICANT CHANGE UP (ref 40–120)
ALT FLD-CCNC: 30 U/L DA — SIGNIFICANT CHANGE UP (ref 10–60)
ANION GAP SERPL CALC-SCNC: 6 MMOL/L — SIGNIFICANT CHANGE UP (ref 5–17)
APTT BLD: 49.9 SEC — HIGH (ref 27.5–35.5)
AST SERPL-CCNC: 51 U/L — HIGH (ref 10–40)
BASOPHILS # BLD AUTO: 0.02 K/UL — SIGNIFICANT CHANGE UP (ref 0–0.2)
BASOPHILS NFR BLD AUTO: 0.2 % — SIGNIFICANT CHANGE UP (ref 0–2)
BILIRUB SERPL-MCNC: 0.3 MG/DL — SIGNIFICANT CHANGE UP (ref 0.2–1.2)
BUN SERPL-MCNC: 32 MG/DL — HIGH (ref 7–18)
CALCIUM SERPL-MCNC: 8.9 MG/DL — SIGNIFICANT CHANGE UP (ref 8.4–10.5)
CHLORIDE SERPL-SCNC: 109 MMOL/L — HIGH (ref 96–108)
CO2 SERPL-SCNC: 25 MMOL/L — SIGNIFICANT CHANGE UP (ref 22–31)
CREAT SERPL-MCNC: 1.57 MG/DL — HIGH (ref 0.5–1.3)
EOSINOPHIL # BLD AUTO: 0.01 K/UL — SIGNIFICANT CHANGE UP (ref 0–0.5)
EOSINOPHIL NFR BLD AUTO: 0.1 % — SIGNIFICANT CHANGE UP (ref 0–6)
GLUCOSE SERPL-MCNC: 112 MG/DL — HIGH (ref 70–99)
HCT VFR BLD CALC: 40.6 % — SIGNIFICANT CHANGE UP (ref 34.5–45)
HGB BLD-MCNC: 13.7 G/DL — SIGNIFICANT CHANGE UP (ref 11.5–15.5)
IMM GRANULOCYTES NFR BLD AUTO: 0.6 % — SIGNIFICANT CHANGE UP (ref 0–1.5)
INR BLD: 1.08 RATIO — SIGNIFICANT CHANGE UP (ref 0.88–1.16)
LACTATE SERPL-SCNC: 1.1 MMOL/L — SIGNIFICANT CHANGE UP (ref 0.7–2)
LYMPHOCYTES # BLD AUTO: 0.42 K/UL — LOW (ref 1–3.3)
LYMPHOCYTES # BLD AUTO: 4.9 % — LOW (ref 13–44)
MCHC RBC-ENTMCNC: 29.8 PG — SIGNIFICANT CHANGE UP (ref 27–34)
MCHC RBC-ENTMCNC: 33.7 GM/DL — SIGNIFICANT CHANGE UP (ref 32–36)
MCV RBC AUTO: 88.3 FL — SIGNIFICANT CHANGE UP (ref 80–100)
MONOCYTES # BLD AUTO: 0.48 K/UL — SIGNIFICANT CHANGE UP (ref 0–0.9)
MONOCYTES NFR BLD AUTO: 5.7 % — SIGNIFICANT CHANGE UP (ref 2–14)
NEUTROPHILS # BLD AUTO: 7.51 K/UL — HIGH (ref 1.8–7.4)
NEUTROPHILS NFR BLD AUTO: 88.5 % — HIGH (ref 43–77)
NRBC # BLD: 0 /100 WBCS — SIGNIFICANT CHANGE UP (ref 0–0)
PLATELET # BLD AUTO: 257 K/UL — SIGNIFICANT CHANGE UP (ref 150–400)
POTASSIUM SERPL-MCNC: 4.3 MMOL/L — SIGNIFICANT CHANGE UP (ref 3.5–5.3)
POTASSIUM SERPL-SCNC: 4.3 MMOL/L — SIGNIFICANT CHANGE UP (ref 3.5–5.3)
PROT SERPL-MCNC: 8.4 G/DL — HIGH (ref 6–8.3)
PROTHROM AB SERPL-ACNC: 12.8 SEC — SIGNIFICANT CHANGE UP (ref 10.6–13.6)
RBC # BLD: 4.6 M/UL — SIGNIFICANT CHANGE UP (ref 3.8–5.2)
RBC # FLD: 14 % — SIGNIFICANT CHANGE UP (ref 10.3–14.5)
SARS-COV-2 RNA SPEC QL NAA+PROBE: DETECTED
SODIUM SERPL-SCNC: 140 MMOL/L — SIGNIFICANT CHANGE UP (ref 135–145)
WBC # BLD: 8.4 K/UL — SIGNIFICANT CHANGE UP (ref 3.8–10.5)
WBC # FLD AUTO: 8.4 K/UL — SIGNIFICANT CHANGE UP (ref 3.8–10.5)

## 2021-12-29 PROCEDURE — 71045 X-RAY EXAM CHEST 1 VIEW: CPT | Mod: 26

## 2021-12-29 PROCEDURE — 99285 EMERGENCY DEPT VISIT HI MDM: CPT | Mod: CS

## 2021-12-29 PROCEDURE — 99223 1ST HOSP IP/OBS HIGH 75: CPT | Mod: GC

## 2021-12-29 RX ORDER — ACETAMINOPHEN 500 MG
1000 TABLET ORAL ONCE
Refills: 0 | Status: COMPLETED | OUTPATIENT
Start: 2021-12-29 | End: 2021-12-29

## 2021-12-29 RX ORDER — ENOXAPARIN SODIUM 100 MG/ML
40 INJECTION SUBCUTANEOUS EVERY 12 HOURS
Refills: 0 | Status: DISCONTINUED | OUTPATIENT
Start: 2021-12-29 | End: 2022-01-06

## 2021-12-29 RX ORDER — ONDANSETRON 8 MG/1
4 TABLET, FILM COATED ORAL EVERY 6 HOURS
Refills: 0 | Status: DISCONTINUED | OUTPATIENT
Start: 2021-12-29 | End: 2021-12-29

## 2021-12-29 RX ORDER — ACETAMINOPHEN 500 MG
650 TABLET ORAL EVERY 6 HOURS
Refills: 0 | Status: DISCONTINUED | OUTPATIENT
Start: 2021-12-29 | End: 2022-01-06

## 2021-12-29 RX ORDER — REMDESIVIR 5 MG/ML
INJECTION INTRAVENOUS
Refills: 0 | Status: COMPLETED | OUTPATIENT
Start: 2021-12-29 | End: 2022-01-02

## 2021-12-29 RX ORDER — ONDANSETRON 8 MG/1
4 TABLET, FILM COATED ORAL EVERY 6 HOURS
Refills: 0 | Status: DISCONTINUED | OUTPATIENT
Start: 2021-12-29 | End: 2022-01-06

## 2021-12-29 RX ORDER — ONDANSETRON 8 MG/1
4 TABLET, FILM COATED ORAL ONCE
Refills: 0 | Status: COMPLETED | OUTPATIENT
Start: 2021-12-29 | End: 2021-12-29

## 2021-12-29 RX ORDER — SODIUM CHLORIDE 9 MG/ML
2600 INJECTION INTRAMUSCULAR; INTRAVENOUS; SUBCUTANEOUS ONCE
Refills: 0 | Status: COMPLETED | OUTPATIENT
Start: 2021-12-29 | End: 2021-12-29

## 2021-12-29 RX ORDER — CEFTRIAXONE 500 MG/1
1000 INJECTION, POWDER, FOR SOLUTION INTRAMUSCULAR; INTRAVENOUS ONCE
Refills: 0 | Status: COMPLETED | OUTPATIENT
Start: 2021-12-29 | End: 2021-12-29

## 2021-12-29 RX ORDER — REMDESIVIR 5 MG/ML
200 INJECTION INTRAVENOUS EVERY 24 HOURS
Refills: 0 | Status: COMPLETED | OUTPATIENT
Start: 2021-12-29 | End: 2021-12-29

## 2021-12-29 RX ORDER — AZITHROMYCIN 500 MG/1
500 TABLET, FILM COATED ORAL ONCE
Refills: 0 | Status: COMPLETED | OUTPATIENT
Start: 2021-12-29 | End: 2021-12-29

## 2021-12-29 RX ORDER — PANTOPRAZOLE SODIUM 20 MG/1
40 TABLET, DELAYED RELEASE ORAL
Refills: 0 | Status: DISCONTINUED | OUTPATIENT
Start: 2021-12-29 | End: 2022-01-06

## 2021-12-29 RX ORDER — REMDESIVIR 5 MG/ML
100 INJECTION INTRAVENOUS EVERY 24 HOURS
Refills: 0 | Status: COMPLETED | OUTPATIENT
Start: 2021-12-30 | End: 2022-01-02

## 2021-12-29 RX ORDER — DEXAMETHASONE 0.5 MG/5ML
6 ELIXIR ORAL DAILY
Refills: 0 | Status: DISCONTINUED | OUTPATIENT
Start: 2021-12-29 | End: 2022-01-06

## 2021-12-29 RX ADMIN — AZITHROMYCIN 500 MILLIGRAM(S): 500 TABLET, FILM COATED ORAL at 19:00

## 2021-12-29 RX ADMIN — REMDESIVIR 500 MILLIGRAM(S): 5 INJECTION INTRAVENOUS at 22:53

## 2021-12-29 RX ADMIN — Medication 1000 MILLIGRAM(S): at 19:00

## 2021-12-29 RX ADMIN — AZITHROMYCIN 255 MILLIGRAM(S): 500 TABLET, FILM COATED ORAL at 17:32

## 2021-12-29 RX ADMIN — Medication 400 MILLIGRAM(S): at 17:33

## 2021-12-29 RX ADMIN — ONDANSETRON 4 MILLIGRAM(S): 8 TABLET, FILM COATED ORAL at 17:30

## 2021-12-29 RX ADMIN — CEFTRIAXONE 100 MILLIGRAM(S): 500 INJECTION, POWDER, FOR SOLUTION INTRAMUSCULAR; INTRAVENOUS at 16:50

## 2021-12-29 RX ADMIN — SODIUM CHLORIDE 2600 MILLILITER(S): 9 INJECTION INTRAMUSCULAR; INTRAVENOUS; SUBCUTANEOUS at 19:00

## 2021-12-29 RX ADMIN — SODIUM CHLORIDE 2600 MILLILITER(S): 9 INJECTION INTRAMUSCULAR; INTRAVENOUS; SUBCUTANEOUS at 17:33

## 2021-12-29 NOTE — ED PROVIDER NOTE - NEUROLOGICAL, MLM
Alert and oriented x 3, clear speech, follows commands, no focal deficits, no motor or sensory deficits.

## 2021-12-29 NOTE — H&P ADULT - ASSESSMENT
76 y.o F with PMH of arthritis, Cataract, HTN BIBEMS with 8 days of productive cough, nausea, shortness of breath, fever admitted for COVID pneumonia requiring oxygen supplementation.  76 y.o F with PMH of arthritis, Cataract, HTN BIBEMS with 8 days of productive cough, nausea, shortness of breath, fever admitted for COVID pneumonia requiring oxygen supplementation 2L NC.  76 y.o F with PMH of arthritis, Cataract, HTN BIBEMS with 8 days of productive cough, nausea, shortness of breath, fever admitted for acute hypoxic respiratory failure 2/2 COVID pneumonia.

## 2021-12-29 NOTE — H&P ADULT - PROBLEM SELECTOR PLAN 1
Acute hypoxic respiratory failure 2/2 COVID  COVID+ 12/25  saturating 88% on room air, improved to 95% on 2L NC  s/p ceftriaxone, azithromycin in ED  will start remdesivir  will start decadron  Lovenox 40 mg SQ bid for dvt ppx given COVID algorithm  PRN tylenol for fevers  PRN robitussin for cough  PRN zofran for nausea (QTc 424 ms) Acute hypoxic respiratory failure 2/2 COVID  COVID+ 12/25  saturating 88% on room air, improved to 95% on 4L NC  s/p ceftriaxone, azithromycin in ED  will start remdesivir  will start decadron  Lovenox 40 mg SQ bid for dvt ppx given COVID algorithm  PRN tylenol for fevers  PRN robitussin for cough  PRN zofran for nausea (QTc 424 ms)

## 2021-12-29 NOTE — ED PROVIDER NOTE - OBJECTIVE STATEMENT
The patient has had flu like symptoms for 8 days but she is now having severe nausea and dyspnea. Her pulse ox on room air is 88%.

## 2021-12-29 NOTE — ED ADULT NURSE NOTE - ED STAT RN HANDOFF DETAILS
Patient admitted to ed awaiting for bed availability, endorsed to Morris OCHOA , safety and comfort maintained.

## 2021-12-29 NOTE — ED PROVIDER NOTE - CARE PLAN
1 Principal Discharge DX:	2019 novel coronavirus disease (COVID-19)  Secondary Diagnosis:	Pneumonia  Secondary Diagnosis:	Sepsis   Principal Discharge DX:	2019 novel coronavirus disease (COVID-19)  Secondary Diagnosis:	Pneumonia

## 2021-12-29 NOTE — ED ADULT NURSE NOTE - NSIMPLEMENTINTERV_GEN_ALL_ED
Implemented All Fall Risk Interventions:  [place] to call system. Call bell, personal items and telephone within reach. Instruct patient to call for assistance. Room bathroom lighting operational. Non-slip footwear when patient is off stretcher. Physically safe environment: no spills, clutter or unnecessary equipment. Stretcher in lowest position, wheels locked, appropriate side rails in place. Provide visual cue, wrist band, yellow gown, etc. Monitor gait and stability. Monitor for mental status changes and reorient to person, place, and time. Review medications for side effects contributing to fall risk. Reinforce activity limits and safety measures with patient and family. Specific interventions were implemented:

## 2021-12-29 NOTE — H&P ADULT - NSHPPHYSICALEXAM_GEN_ALL_CORE
PHYSICAL EXAM:  GENERAL: NAD, lying in bed on NC 2L  HEAD:  Atraumatic, Normocephalic  EYES: EOMI, PERRLA, conjunctiva and sclera clear  ENT: Moist mucous membranes  NECK: Supple, No JVD  CHEST/LUNG: Clear to auscultation bilaterally; No rales, rhonchi, wheezing, or rubs. Unlabored respirations  HEART: Regular rate and rhythm; No murmurs, rubs, or gallops  ABDOMEN: Bowel sounds present; Soft, Nontender, Nondistended  EXTREMITIES:  2+ Peripheral Pulses, brisk capillary refill. No clubbing, cyanosis, or edema  NERVOUS SYSTEM:  Alert & Oriented X3, speech clear. No deficits   MSK: FROM all 4 extremities, full and equal strength  SKIN: No rashes or lesions

## 2021-12-29 NOTE — ED ADULT NURSE NOTE - NS ED NURSE DISCH DISPOSITION
Gauri, is this true?  If you're not going to prescribe for him can you please facilitate transfer to someone who does?  I don't write chronic controlled substances for new patients.
Admitted

## 2021-12-29 NOTE — ED ADULT NURSE NOTE - OBJECTIVE STATEMENT
Patient presents with c/o nausea . denies chest/back pain, cough, SOB, fever/chills. Patient presents with c/o nausea, generalized weakness, body ache,  denies chest/back pain, cough, SOB.

## 2021-12-29 NOTE — ED ADULT TRIAGE NOTE - NSWEIGHTCALCTOOLDRUG_GEN_A_CORE
used
[facility]  PEDIATRICS  [address] [place] [address]  Phone: [phone]  Fax: [phone]  Follow Up Time:

## 2021-12-29 NOTE — H&P ADULT - HISTORY OF PRESENT ILLNESS
76 y.o F with PMH of arthritis, Cataract, HTN BIBEMS with 8 days of productive cough, nausea, shortness of breath, fever.   denies chest pain  vaccinated pfizer dose 1 and 2    ED Course  T 100.3 P 116 BP 96/59 R 20 SpO2 88%  EKG with sinus tachycardia @ 114 bpm  s/p acetaminophen 1 g IV, azithromycin 500 mg IV, ceftriaxone 1 g, zofran 4 mg IV, 1 L NS bolus 76 y.o F with PMH of Cataract s/p surgery (not on any home meds) BIBEMS with 8 days of productive cough with white sputum, nausea, vomiting, fever, and dizziness. Patient states that she was exposed to a friend with COVID on 12/19 and since 12/21 has been having cough, subjective fevers, chills, nausea, vomiting (last episode 12/27, nonbloody nonbilious), as well as loose stools (last episode today, reports 1-3 episodes daily since last week). Patient states she tested positive for COVID19 on 12/25 at [facility]. Patient denies chest pain, shortness of breath, palpitations, headache, abdominal pain, constipation. Patient reports she is vaccinated in August (Pfizer, 2 doses)    GOC: Full code    ED Course  T 100.3 P 116 BP 96/59 R 20 SpO2 88%  EKG with sinus tachycardia @ 114 bpm  s/p acetaminophen 1 g IV, azithromycin 500 mg IV, ceftriaxone 1 g, zofran 4 mg IV, 2.6 L NS bolus

## 2021-12-29 NOTE — H&P ADULT - ATTENDING COMMENTS
Patient seen and examined. Case discussed with Dr. Sims. In brief, this is a 75 yo F with no known PMHx who presents with acute hypoxic respiratory failure 2/2 COVID PNA. Patient tested positive for COVID at outside [facility] on 12/25/21. Will start remdesivir 200mg IVx1, and then 100mg IV x 4 days. Will start decadron 6mg IV qdaily given patient is hypoxic. Patient also with significant nausea and vomiting. Agree with PRN Zofran and IVF. Received 2.6L IVF in the ED with improvement in vital signs. Remaining care as above.

## 2021-12-29 NOTE — H&P ADULT - NSHPSOCIALHISTORY_GEN_ALL_CORE
Patient states she lives with her , ambulates and performs ADLs independently. Denies smoking, alcohol or other drug use or history.

## 2021-12-29 NOTE — ED PROVIDER NOTE - CLINICAL SUMMARY MEDICAL DECISION MAKING FREE TEXT BOX
76F c/o nausea and dyspnea, known to be +COVID for 8 days  -pt is ill appearing, clinically dehydrated, in respiratory distress 88%RA sat, improves with O2 to 95%  -cbc, cmp, lactate, ecg, cxr, ua  -ivf, iv abx, blood cx, urine cx  -covid swab  -as the patient requires supp o2 to maintain normal saturation, will require admission

## 2021-12-29 NOTE — H&P ADULT - PROBLEM SELECTOR PLAN 2
SCr 1.57 on admission  baseline SCr ~ 0.8  s/p 2.6 L NS bolus  Monitor BMP  Consider Renal US, urine lytes if not improving with IV hydration

## 2021-12-29 NOTE — ED ADULT TRIAGE NOTE - CHIEF COMPLAINT QUOTE
SOB, fever chills weakness, tested positive for covid on 12/25/2021, on EMS arrival oxygenation 87-88, NRB 98%

## 2021-12-30 ENCOUNTER — TRANSCRIPTION ENCOUNTER (OUTPATIENT)
Age: 76
End: 2021-12-30

## 2021-12-30 DIAGNOSIS — Z29.9 ENCOUNTER FOR PROPHYLACTIC MEASURES, UNSPECIFIED: ICD-10-CM

## 2021-12-30 LAB
A1C WITH ESTIMATED AVERAGE GLUCOSE RESULT: 6.3 % — HIGH (ref 4–5.6)
ALBUMIN SERPL ELPH-MCNC: 2.7 G/DL — LOW (ref 3.5–5)
ALP SERPL-CCNC: 54 U/L — SIGNIFICANT CHANGE UP (ref 40–120)
ALT FLD-CCNC: 27 U/L DA — SIGNIFICANT CHANGE UP (ref 10–60)
ANION GAP SERPL CALC-SCNC: 4 MMOL/L — LOW (ref 5–17)
AST SERPL-CCNC: 42 U/L — HIGH (ref 10–40)
BASOPHILS # BLD AUTO: 0.02 K/UL — SIGNIFICANT CHANGE UP (ref 0–0.2)
BASOPHILS NFR BLD AUTO: 0.3 % — SIGNIFICANT CHANGE UP (ref 0–2)
BILIRUB SERPL-MCNC: 0.3 MG/DL — SIGNIFICANT CHANGE UP (ref 0.2–1.2)
BUN SERPL-MCNC: 23 MG/DL — HIGH (ref 7–18)
CALCIUM SERPL-MCNC: 8.5 MG/DL — SIGNIFICANT CHANGE UP (ref 8.4–10.5)
CHLORIDE SERPL-SCNC: 115 MMOL/L — HIGH (ref 96–108)
CHOLEST SERPL-MCNC: 126 MG/DL — SIGNIFICANT CHANGE UP
CO2 SERPL-SCNC: 26 MMOL/L — SIGNIFICANT CHANGE UP (ref 22–31)
CREAT SERPL-MCNC: 1.11 MG/DL — SIGNIFICANT CHANGE UP (ref 0.5–1.3)
CRP SERPL-MCNC: 48 MG/L — HIGH
D DIMER BLD IA.RAPID-MCNC: 520 NG/ML DDU — HIGH
EOSINOPHIL # BLD AUTO: 0.01 K/UL — SIGNIFICANT CHANGE UP (ref 0–0.5)
EOSINOPHIL NFR BLD AUTO: 0.2 % — SIGNIFICANT CHANGE UP (ref 0–6)
ERYTHROCYTE [SEDIMENTATION RATE] IN BLOOD: 74 MM/HR — HIGH (ref 0–20)
ESTIMATED AVERAGE GLUCOSE: 134 MG/DL — HIGH (ref 68–114)
FERRITIN SERPL-MCNC: 447 NG/ML — HIGH (ref 15–150)
GLUCOSE SERPL-MCNC: 103 MG/DL — HIGH (ref 70–99)
HCT VFR BLD CALC: 37.3 % — SIGNIFICANT CHANGE UP (ref 34.5–45)
HCV AB S/CO SERPL IA: 0.13 S/CO — SIGNIFICANT CHANGE UP (ref 0–0.99)
HCV AB SERPL-IMP: SIGNIFICANT CHANGE UP
HDLC SERPL-MCNC: 33 MG/DL — LOW
HGB BLD-MCNC: 12.4 G/DL — SIGNIFICANT CHANGE UP (ref 11.5–15.5)
IMM GRANULOCYTES NFR BLD AUTO: 0.6 % — SIGNIFICANT CHANGE UP (ref 0–1.5)
LDH SERPL L TO P-CCNC: 357 U/L — HIGH (ref 120–225)
LIPID PNL WITH DIRECT LDL SERPL: 72 MG/DL — SIGNIFICANT CHANGE UP
LYMPHOCYTES # BLD AUTO: 0.36 K/UL — LOW (ref 1–3.3)
LYMPHOCYTES # BLD AUTO: 5.7 % — LOW (ref 13–44)
MAGNESIUM SERPL-MCNC: 2.4 MG/DL — SIGNIFICANT CHANGE UP (ref 1.6–2.6)
MCHC RBC-ENTMCNC: 29.6 PG — SIGNIFICANT CHANGE UP (ref 27–34)
MCHC RBC-ENTMCNC: 33.2 GM/DL — SIGNIFICANT CHANGE UP (ref 32–36)
MCV RBC AUTO: 89 FL — SIGNIFICANT CHANGE UP (ref 80–100)
MONOCYTES # BLD AUTO: 0.31 K/UL — SIGNIFICANT CHANGE UP (ref 0–0.9)
MONOCYTES NFR BLD AUTO: 4.9 % — SIGNIFICANT CHANGE UP (ref 2–14)
NEUTROPHILS # BLD AUTO: 5.58 K/UL — SIGNIFICANT CHANGE UP (ref 1.8–7.4)
NEUTROPHILS NFR BLD AUTO: 88.3 % — HIGH (ref 43–77)
NON HDL CHOLESTEROL: 93 MG/DL — SIGNIFICANT CHANGE UP
NRBC # BLD: 0 /100 WBCS — SIGNIFICANT CHANGE UP (ref 0–0)
PHOSPHATE SERPL-MCNC: 2.9 MG/DL — SIGNIFICANT CHANGE UP (ref 2.5–4.5)
PLATELET # BLD AUTO: 236 K/UL — SIGNIFICANT CHANGE UP (ref 150–400)
POTASSIUM SERPL-MCNC: 4.8 MMOL/L — SIGNIFICANT CHANGE UP (ref 3.5–5.3)
POTASSIUM SERPL-SCNC: 4.8 MMOL/L — SIGNIFICANT CHANGE UP (ref 3.5–5.3)
PROCALCITONIN SERPL-MCNC: 0.14 NG/ML — HIGH (ref 0.02–0.1)
PROT SERPL-MCNC: 7.4 G/DL — SIGNIFICANT CHANGE UP (ref 6–8.3)
RBC # BLD: 4.19 M/UL — SIGNIFICANT CHANGE UP (ref 3.8–5.2)
RBC # FLD: 14.3 % — SIGNIFICANT CHANGE UP (ref 10.3–14.5)
SODIUM SERPL-SCNC: 145 MMOL/L — SIGNIFICANT CHANGE UP (ref 135–145)
TRIGL SERPL-MCNC: 105 MG/DL — SIGNIFICANT CHANGE UP
TSH SERPL-MCNC: 0.79 UU/ML — SIGNIFICANT CHANGE UP (ref 0.34–4.82)
WBC # BLD: 6.32 K/UL — SIGNIFICANT CHANGE UP (ref 3.8–10.5)
WBC # FLD AUTO: 6.32 K/UL — SIGNIFICANT CHANGE UP (ref 3.8–10.5)

## 2021-12-30 PROCEDURE — 99233 SBSQ HOSP IP/OBS HIGH 50: CPT | Mod: GC

## 2021-12-30 RX ADMIN — PANTOPRAZOLE SODIUM 40 MILLIGRAM(S): 20 TABLET, DELAYED RELEASE ORAL at 06:19

## 2021-12-30 RX ADMIN — ENOXAPARIN SODIUM 40 MILLIGRAM(S): 100 INJECTION SUBCUTANEOUS at 17:32

## 2021-12-30 RX ADMIN — Medication 100 MILLIGRAM(S): at 21:36

## 2021-12-30 RX ADMIN — ENOXAPARIN SODIUM 40 MILLIGRAM(S): 100 INJECTION SUBCUTANEOUS at 06:19

## 2021-12-30 RX ADMIN — Medication 100 MILLIGRAM(S): at 06:18

## 2021-12-30 RX ADMIN — Medication 6 MILLIGRAM(S): at 06:18

## 2021-12-30 RX ADMIN — Medication 100 MILLIGRAM(S): at 12:14

## 2021-12-30 RX ADMIN — REMDESIVIR 500 MILLIGRAM(S): 5 INJECTION INTRAVENOUS at 22:01

## 2021-12-30 NOTE — PATIENT PROFILE ADULT - FALL HARM RISK - HARM RISK INTERVENTIONS
Assistance with ambulation/Assistance OOB with selected safe patient handling equipment/Communicate Risk of Fall with Harm to all staff/Discuss with provider need for PT consult/Monitor gait and stability/Reinforce activity limits and safety measures with patient and family/Tailored Fall Risk Interventions/Visual Cue: Yellow wristband and red socks/Bed in lowest position, wheels locked, appropriate side rails in place/Call bell, personal items and telephone in reach/Instruct patient to call for assistance before getting out of bed or chair/Non-slip footwear when patient is out of bed/[place] to call system/Physically safe environment - no spills, clutter or unnecessary equipment/Purposeful Proactive Rounding/Room/bathroom lighting operational, light cord in reach

## 2021-12-30 NOTE — PATIENT PROFILE ADULT - VISION (WITH CORRECTIVE LENSES IF THE PATIENT USUALLY WEARS THEM):
Patient/Mother
Normal vision: sees adequately in most situations; can see medication labels, newsprint

## 2021-12-31 DIAGNOSIS — J96.01 ACUTE RESPIRATORY FAILURE WITH HYPOXIA: ICD-10-CM

## 2021-12-31 LAB
ALBUMIN SERPL ELPH-MCNC: 2.6 G/DL — LOW (ref 3.5–5)
ALP SERPL-CCNC: 54 U/L — SIGNIFICANT CHANGE UP (ref 40–120)
ALT FLD-CCNC: 30 U/L DA — SIGNIFICANT CHANGE UP (ref 10–60)
ANION GAP SERPL CALC-SCNC: 7 MMOL/L — SIGNIFICANT CHANGE UP (ref 5–17)
APPEARANCE UR: CLEAR — SIGNIFICANT CHANGE UP
AST SERPL-CCNC: 38 U/L — SIGNIFICANT CHANGE UP (ref 10–40)
BACTERIA # UR AUTO: ABNORMAL /HPF
BILIRUB SERPL-MCNC: 0.2 MG/DL — SIGNIFICANT CHANGE UP (ref 0.2–1.2)
BILIRUB UR-MCNC: NEGATIVE — SIGNIFICANT CHANGE UP
BUN SERPL-MCNC: 25 MG/DL — HIGH (ref 7–18)
CALCIUM SERPL-MCNC: 9 MG/DL — SIGNIFICANT CHANGE UP (ref 8.4–10.5)
CHLORIDE SERPL-SCNC: 114 MMOL/L — HIGH (ref 96–108)
CHLORIDE UR-SCNC: 52 MMOL/L — SIGNIFICANT CHANGE UP
CO2 SERPL-SCNC: 24 MMOL/L — SIGNIFICANT CHANGE UP (ref 22–31)
COLOR SPEC: YELLOW — SIGNIFICANT CHANGE UP
CREAT ?TM UR-MCNC: 64 MG/DL — SIGNIFICANT CHANGE UP
CREAT SERPL-MCNC: 1.07 MG/DL — SIGNIFICANT CHANGE UP (ref 0.5–1.3)
DIFF PNL FLD: NEGATIVE — SIGNIFICANT CHANGE UP
EPI CELLS # UR: ABNORMAL /HPF
GLUCOSE BLDC GLUCOMTR-MCNC: 142 MG/DL — HIGH (ref 70–99)
GLUCOSE BLDC GLUCOMTR-MCNC: 144 MG/DL — HIGH (ref 70–99)
GLUCOSE SERPL-MCNC: 111 MG/DL — HIGH (ref 70–99)
GLUCOSE UR QL: NEGATIVE — SIGNIFICANT CHANGE UP
HCT VFR BLD CALC: 37.3 % — SIGNIFICANT CHANGE UP (ref 34.5–45)
HGB BLD-MCNC: 12.3 G/DL — SIGNIFICANT CHANGE UP (ref 11.5–15.5)
KETONES UR-MCNC: NEGATIVE — SIGNIFICANT CHANGE UP
LEUKOCYTE ESTERASE UR-ACNC: NEGATIVE — SIGNIFICANT CHANGE UP
MCHC RBC-ENTMCNC: 29.6 PG — SIGNIFICANT CHANGE UP (ref 27–34)
MCHC RBC-ENTMCNC: 33 GM/DL — SIGNIFICANT CHANGE UP (ref 32–36)
MCV RBC AUTO: 89.7 FL — SIGNIFICANT CHANGE UP (ref 80–100)
NITRITE UR-MCNC: NEGATIVE — SIGNIFICANT CHANGE UP
NRBC # BLD: 0 /100 WBCS — SIGNIFICANT CHANGE UP (ref 0–0)
OSMOLALITY UR: 463 MOS/KG — SIGNIFICANT CHANGE UP (ref 50–1200)
PH UR: 6 — SIGNIFICANT CHANGE UP (ref 5–8)
PLATELET # BLD AUTO: 261 K/UL — SIGNIFICANT CHANGE UP (ref 150–400)
POTASSIUM SERPL-MCNC: 4.5 MMOL/L — SIGNIFICANT CHANGE UP (ref 3.5–5.3)
POTASSIUM SERPL-SCNC: 4.5 MMOL/L — SIGNIFICANT CHANGE UP (ref 3.5–5.3)
PROT SERPL-MCNC: 7.2 G/DL — SIGNIFICANT CHANGE UP (ref 6–8.3)
PROT UR-MCNC: 15
RBC # BLD: 4.16 M/UL — SIGNIFICANT CHANGE UP (ref 3.8–5.2)
RBC # FLD: 14.2 % — SIGNIFICANT CHANGE UP (ref 10.3–14.5)
RBC CASTS # UR COMP ASSIST: SIGNIFICANT CHANGE UP /HPF (ref 0–2)
SODIUM SERPL-SCNC: 145 MMOL/L — SIGNIFICANT CHANGE UP (ref 135–145)
SODIUM UR-SCNC: 54 MMOL/L — SIGNIFICANT CHANGE UP
SP GR SPEC: 1.01 — SIGNIFICANT CHANGE UP (ref 1.01–1.02)
UROBILINOGEN FLD QL: NEGATIVE — SIGNIFICANT CHANGE UP
WBC # BLD: 3.91 K/UL — SIGNIFICANT CHANGE UP (ref 3.8–10.5)
WBC # FLD AUTO: 3.91 K/UL — SIGNIFICANT CHANGE UP (ref 3.8–10.5)
WBC UR QL: SIGNIFICANT CHANGE UP /HPF (ref 0–5)

## 2021-12-31 PROCEDURE — 99233 SBSQ HOSP IP/OBS HIGH 50: CPT | Mod: GC

## 2021-12-31 RX ORDER — DEXTROSE 50 % IN WATER 50 %
25 SYRINGE (ML) INTRAVENOUS ONCE
Refills: 0 | Status: DISCONTINUED | OUTPATIENT
Start: 2021-12-31 | End: 2022-01-06

## 2021-12-31 RX ORDER — SODIUM CHLORIDE 9 MG/ML
1000 INJECTION, SOLUTION INTRAVENOUS
Refills: 0 | Status: DISCONTINUED | OUTPATIENT
Start: 2021-12-31 | End: 2022-01-06

## 2021-12-31 RX ORDER — INSULIN LISPRO 100/ML
VIAL (ML) SUBCUTANEOUS
Refills: 0 | Status: DISCONTINUED | OUTPATIENT
Start: 2021-12-31 | End: 2022-01-06

## 2021-12-31 RX ORDER — DEXTROSE 50 % IN WATER 50 %
15 SYRINGE (ML) INTRAVENOUS ONCE
Refills: 0 | Status: DISCONTINUED | OUTPATIENT
Start: 2021-12-31 | End: 2022-01-06

## 2021-12-31 RX ORDER — GLUCAGON INJECTION, SOLUTION 0.5 MG/.1ML
1 INJECTION, SOLUTION SUBCUTANEOUS ONCE
Refills: 0 | Status: DISCONTINUED | OUTPATIENT
Start: 2021-12-31 | End: 2022-01-06

## 2021-12-31 RX ORDER — DEXTROSE 50 % IN WATER 50 %
12.5 SYRINGE (ML) INTRAVENOUS ONCE
Refills: 0 | Status: DISCONTINUED | OUTPATIENT
Start: 2021-12-31 | End: 2022-01-06

## 2021-12-31 RX ADMIN — Medication 6 MILLIGRAM(S): at 05:24

## 2021-12-31 RX ADMIN — PANTOPRAZOLE SODIUM 40 MILLIGRAM(S): 20 TABLET, DELAYED RELEASE ORAL at 05:23

## 2021-12-31 RX ADMIN — ENOXAPARIN SODIUM 40 MILLIGRAM(S): 100 INJECTION SUBCUTANEOUS at 17:14

## 2021-12-31 RX ADMIN — Medication 600 MILLIGRAM(S): at 17:14

## 2021-12-31 RX ADMIN — REMDESIVIR 500 MILLIGRAM(S): 5 INJECTION INTRAVENOUS at 21:58

## 2021-12-31 RX ADMIN — ENOXAPARIN SODIUM 40 MILLIGRAM(S): 100 INJECTION SUBCUTANEOUS at 05:23

## 2021-12-31 NOTE — DISCHARGE NOTE PROVIDER - HOSPITAL COURSE
76 y.o F with PMH of arthritis, Cataract, HTN BIBEMS with 8 days of productive cough, nausea, shortness of breath, fever. In ED pt was saturating 88% on room air, improved to 95% on 4L NC. CXR showed Patchy lower infiltrates. Pt was admitted for acute hypoxic respiratory failure 2/2 COVID pneumonia. Was started on ceftriaxone, & azithromycin in ED.         >>>>>>>>>>>>>>>>>>>>>>>>>>>>>>>>>>>>>>INCOMPLETE>>>>>>>>>>>>>>>>>>>>>>>>>>>>>   Patient is a 76 year old Female with PMH of Cataract s/p surgery (not on any home meds) BIBEMS with for productive cough with white sputum, nausea, vomiting, fever, and dizziness. Patient states that she was exposed to a friend with COVID on 12/19 and since 12/21 has been having cough, subjective fevers, chills, nausea, vomiting, loose stools. Patient admitted to medicine for AHRF 2/2 COVID-19 and was started on supplemental 02 and placed on airborne isolation.  Patient reports she is vaccinated in August (Pfizer, 2 doses). Chest Xray with Patchy lower infiltrates, patient was given Azithromycin and Rocephin in ED. Patient completed Remdesivir and dexamethasone. Patient also have to found elevated SCr which trended down after IV hydration.   D-Dimer 506. treated on full dose Lovenox   PT recommends [facility], COVID positive will be discharged to [facility].    Please note that this a brief summary of hospital course please refer to daily progress notes and consult notes for full course and events. Patient seen and examined at bedside, discussed with medical attending. Patient medically cleared for discharge to [facility].    Patient is a 76 year old Female with PMH of Cataract s/p surgery (not on any home meds) BIBEMS with for productive cough with white sputum, nausea, vomiting, fever, and dizziness. Patient states that she was exposed to a friend with COVID on 12/19 and since 12/21 has been having cough, subjective fevers, chills, nausea, vomiting, loose stools. Patient admitted to medicine for AHRF 2/2 COVID-19 and was started on supplemental 02 and placed on airborne isolation.  Patient reports she is vaccinated in August (Pfizer, 2 doses). Chest Xray with Patchy lower infiltrates, patient was given Azithromycin and Rocephin in ED. Patient completed Remdesivir and dexamethasone. Patient also have to found elevated SCr which trended down after IV hydration.   D-Dimer 506. treated on full dose Lovenox. PT recommends [facility], COVID positive will be discharged to [facility].    Please note that this a brief summary of hospital course please refer to daily progress notes and consult notes for full course and events. Patient seen and examined at bedside, discussed with medical attending. Patient medically cleared for discharge to [facility].

## 2021-12-31 NOTE — DISCHARGE NOTE PROVIDER - NSDCMRMEDTOKEN_GEN_ALL_CORE_FT
acetaminophen 325 mg oral tablet: 2 tab(s) orally every 6 hours, As needed, Temp greater or equal to 38C (100.4F)  pantoprazole 40 mg oral delayed release tablet: 1 tab(s) orally once a day (before a meal)  rivaroxaban 10 mg oral tablet: 1 tab(s) orally once a day

## 2021-12-31 NOTE — DISCHARGE NOTE PROVIDER - NSDCCPCAREPLAN_GEN_ALL_CORE_FT
PRINCIPAL DISCHARGE DIAGNOSIS  Diagnosis: Acute respiratory failure with hypoxia  Assessment and Plan of Treatment: You COVID-19 PCR is positive, and you were treated for COVID Pneumonia. You completed a course of Remdesivir and Dexamethasone therapy.   Your D-Dimer is 506, and will need to continue Xarelto which is a anticoagulation  You will be needing anticoagulation (blood thinner) medication management for 1 monts. Please seek medical attention when you have:  -passing blood in your urine  -passing blood when you poo or having black poo  -severe bruising  -nosebleeding that does not stop.  -vomiting blood or coughing up blood.  Please follow up with your Pulmonologist and Primary Care Physician for further management. Please follow up in 1-2 weeks.      SECONDARY DISCHARGE DIAGNOSES  Diagnosis: BOSTON (acute kidney injury)  Assessment and Plan of Treatment: Please follow up with your Primary Care Physician for a repeat BMP. Your creatinine levels were elvated and were going down, however you need a repeat to evaluate the current level.  Please increase your fluid intake to help hydrate your kidneys.  Avoid and ask your medical provider before taking taking medications that will harm your kidneys like, NSAIDS, contrast dye, etc....  You creatinine is back to baseline. Please repeat your BMP in 1 week.  Please continue taking your medication as prescribed. If you have any questions or concerns about your medication please direct them to your prescribing Healthcare Provider.

## 2022-01-01 LAB
ALBUMIN SERPL ELPH-MCNC: 2.6 G/DL — LOW (ref 3.5–5)
ALP SERPL-CCNC: 56 U/L — SIGNIFICANT CHANGE UP (ref 40–120)
ALT FLD-CCNC: 28 U/L DA — SIGNIFICANT CHANGE UP (ref 10–60)
ANION GAP SERPL CALC-SCNC: 6 MMOL/L — SIGNIFICANT CHANGE UP (ref 5–17)
AST SERPL-CCNC: 32 U/L — SIGNIFICANT CHANGE UP (ref 10–40)
BILIRUB SERPL-MCNC: 0.3 MG/DL — SIGNIFICANT CHANGE UP (ref 0.2–1.2)
BUN SERPL-MCNC: 32 MG/DL — HIGH (ref 7–18)
CALCIUM SERPL-MCNC: 9 MG/DL — SIGNIFICANT CHANGE UP (ref 8.4–10.5)
CHLORIDE SERPL-SCNC: 114 MMOL/L — HIGH (ref 96–108)
CO2 SERPL-SCNC: 24 MMOL/L — SIGNIFICANT CHANGE UP (ref 22–31)
CREAT SERPL-MCNC: 1.03 MG/DL — SIGNIFICANT CHANGE UP (ref 0.5–1.3)
GLUCOSE BLDC GLUCOMTR-MCNC: 105 MG/DL — HIGH (ref 70–99)
GLUCOSE BLDC GLUCOMTR-MCNC: 141 MG/DL — HIGH (ref 70–99)
GLUCOSE BLDC GLUCOMTR-MCNC: 147 MG/DL — HIGH (ref 70–99)
GLUCOSE BLDC GLUCOMTR-MCNC: 163 MG/DL — HIGH (ref 70–99)
GLUCOSE SERPL-MCNC: 117 MG/DL — HIGH (ref 70–99)
HCT VFR BLD CALC: 41 % — SIGNIFICANT CHANGE UP (ref 34.5–45)
HGB BLD-MCNC: 13.4 G/DL — SIGNIFICANT CHANGE UP (ref 11.5–15.5)
MCHC RBC-ENTMCNC: 29.3 PG — SIGNIFICANT CHANGE UP (ref 27–34)
MCHC RBC-ENTMCNC: 32.7 GM/DL — SIGNIFICANT CHANGE UP (ref 32–36)
MCV RBC AUTO: 89.5 FL — SIGNIFICANT CHANGE UP (ref 80–100)
NRBC # BLD: 0 /100 WBCS — SIGNIFICANT CHANGE UP (ref 0–0)
PLATELET # BLD AUTO: 344 K/UL — SIGNIFICANT CHANGE UP (ref 150–400)
POTASSIUM SERPL-MCNC: 4.4 MMOL/L — SIGNIFICANT CHANGE UP (ref 3.5–5.3)
POTASSIUM SERPL-SCNC: 4.4 MMOL/L — SIGNIFICANT CHANGE UP (ref 3.5–5.3)
PROT SERPL-MCNC: 7.6 G/DL — SIGNIFICANT CHANGE UP (ref 6–8.3)
RBC # BLD: 4.58 M/UL — SIGNIFICANT CHANGE UP (ref 3.8–5.2)
RBC # FLD: 14 % — SIGNIFICANT CHANGE UP (ref 10.3–14.5)
SODIUM SERPL-SCNC: 144 MMOL/L — SIGNIFICANT CHANGE UP (ref 135–145)
WBC # BLD: 7.61 K/UL — SIGNIFICANT CHANGE UP (ref 3.8–10.5)
WBC # FLD AUTO: 7.61 K/UL — SIGNIFICANT CHANGE UP (ref 3.8–10.5)

## 2022-01-01 PROCEDURE — 99232 SBSQ HOSP IP/OBS MODERATE 35: CPT

## 2022-01-01 RX ADMIN — ENOXAPARIN SODIUM 40 MILLIGRAM(S): 100 INJECTION SUBCUTANEOUS at 06:04

## 2022-01-01 RX ADMIN — Medication 600 MILLIGRAM(S): at 17:09

## 2022-01-01 RX ADMIN — PANTOPRAZOLE SODIUM 40 MILLIGRAM(S): 20 TABLET, DELAYED RELEASE ORAL at 06:04

## 2022-01-01 RX ADMIN — Medication 600 MILLIGRAM(S): at 06:04

## 2022-01-01 RX ADMIN — Medication 6 MILLIGRAM(S): at 06:04

## 2022-01-01 RX ADMIN — REMDESIVIR 500 MILLIGRAM(S): 5 INJECTION INTRAVENOUS at 22:14

## 2022-01-02 LAB
ALBUMIN SERPL ELPH-MCNC: 2.8 G/DL — LOW (ref 3.5–5)
ALP SERPL-CCNC: 55 U/L — SIGNIFICANT CHANGE UP (ref 40–120)
ALT FLD-CCNC: 33 U/L DA — SIGNIFICANT CHANGE UP (ref 10–60)
ANION GAP SERPL CALC-SCNC: 6 MMOL/L — SIGNIFICANT CHANGE UP (ref 5–17)
AST SERPL-CCNC: 30 U/L — SIGNIFICANT CHANGE UP (ref 10–40)
BILIRUB SERPL-MCNC: 0.3 MG/DL — SIGNIFICANT CHANGE UP (ref 0.2–1.2)
BUN SERPL-MCNC: 38 MG/DL — HIGH (ref 7–18)
CALCIUM SERPL-MCNC: 9.2 MG/DL — SIGNIFICANT CHANGE UP (ref 8.4–10.5)
CHLORIDE SERPL-SCNC: 114 MMOL/L — HIGH (ref 96–108)
CO2 SERPL-SCNC: 24 MMOL/L — SIGNIFICANT CHANGE UP (ref 22–31)
CREAT SERPL-MCNC: 1.04 MG/DL — SIGNIFICANT CHANGE UP (ref 0.5–1.3)
CULTURE RESULTS: SIGNIFICANT CHANGE UP
D DIMER BLD IA.RAPID-MCNC: 506 NG/ML DDU — HIGH
FERRITIN SERPL-MCNC: 828 NG/ML — HIGH (ref 15–150)
GLUCOSE BLDC GLUCOMTR-MCNC: 120 MG/DL — HIGH (ref 70–99)
GLUCOSE BLDC GLUCOMTR-MCNC: 180 MG/DL — HIGH (ref 70–99)
GLUCOSE BLDC GLUCOMTR-MCNC: 194 MG/DL — HIGH (ref 70–99)
GLUCOSE SERPL-MCNC: 107 MG/DL — HIGH (ref 70–99)
HCT VFR BLD CALC: 40.1 % — SIGNIFICANT CHANGE UP (ref 34.5–45)
HGB BLD-MCNC: 13 G/DL — SIGNIFICANT CHANGE UP (ref 11.5–15.5)
LDH SERPL L TO P-CCNC: 321 U/L — HIGH (ref 120–225)
MCHC RBC-ENTMCNC: 28.9 PG — SIGNIFICANT CHANGE UP (ref 27–34)
MCHC RBC-ENTMCNC: 32.4 GM/DL — SIGNIFICANT CHANGE UP (ref 32–36)
MCV RBC AUTO: 89.1 FL — SIGNIFICANT CHANGE UP (ref 80–100)
NRBC # BLD: 0 /100 WBCS — SIGNIFICANT CHANGE UP (ref 0–0)
PLATELET # BLD AUTO: 366 K/UL — SIGNIFICANT CHANGE UP (ref 150–400)
POTASSIUM SERPL-MCNC: 4.4 MMOL/L — SIGNIFICANT CHANGE UP (ref 3.5–5.3)
POTASSIUM SERPL-SCNC: 4.4 MMOL/L — SIGNIFICANT CHANGE UP (ref 3.5–5.3)
PROT SERPL-MCNC: 7.2 G/DL — SIGNIFICANT CHANGE UP (ref 6–8.3)
RBC # BLD: 4.5 M/UL — SIGNIFICANT CHANGE UP (ref 3.8–5.2)
RBC # FLD: 14 % — SIGNIFICANT CHANGE UP (ref 10.3–14.5)
SODIUM SERPL-SCNC: 144 MMOL/L — SIGNIFICANT CHANGE UP (ref 135–145)
SPECIMEN SOURCE: SIGNIFICANT CHANGE UP
WBC # BLD: 8.91 K/UL — SIGNIFICANT CHANGE UP (ref 3.8–10.5)
WBC # FLD AUTO: 8.91 K/UL — SIGNIFICANT CHANGE UP (ref 3.8–10.5)

## 2022-01-02 PROCEDURE — 99232 SBSQ HOSP IP/OBS MODERATE 35: CPT

## 2022-01-02 RX ADMIN — Medication 6 MILLIGRAM(S): at 05:55

## 2022-01-02 RX ADMIN — REMDESIVIR 500 MILLIGRAM(S): 5 INJECTION INTRAVENOUS at 22:46

## 2022-01-02 RX ADMIN — PANTOPRAZOLE SODIUM 40 MILLIGRAM(S): 20 TABLET, DELAYED RELEASE ORAL at 05:55

## 2022-01-02 RX ADMIN — ENOXAPARIN SODIUM 40 MILLIGRAM(S): 100 INJECTION SUBCUTANEOUS at 05:55

## 2022-01-02 RX ADMIN — Medication 600 MILLIGRAM(S): at 05:55

## 2022-01-02 RX ADMIN — Medication 600 MILLIGRAM(S): at 17:18

## 2022-01-02 RX ADMIN — Medication 1: at 12:12

## 2022-01-02 RX ADMIN — ENOXAPARIN SODIUM 40 MILLIGRAM(S): 100 INJECTION SUBCUTANEOUS at 17:18

## 2022-01-03 DIAGNOSIS — Z02.9 ENCOUNTER FOR ADMINISTRATIVE EXAMINATIONS, UNSPECIFIED: ICD-10-CM

## 2022-01-03 LAB
ALBUMIN SERPL ELPH-MCNC: 2.8 G/DL — LOW (ref 3.5–5)
ALP SERPL-CCNC: 49 U/L — SIGNIFICANT CHANGE UP (ref 40–120)
ALT FLD-CCNC: 27 U/L DA — SIGNIFICANT CHANGE UP (ref 10–60)
ANION GAP SERPL CALC-SCNC: 8 MMOL/L — SIGNIFICANT CHANGE UP (ref 5–17)
AST SERPL-CCNC: 20 U/L — SIGNIFICANT CHANGE UP (ref 10–40)
BILIRUB SERPL-MCNC: 0.4 MG/DL — SIGNIFICANT CHANGE UP (ref 0.2–1.2)
BUN SERPL-MCNC: 32 MG/DL — HIGH (ref 7–18)
CALCIUM SERPL-MCNC: 8.7 MG/DL — SIGNIFICANT CHANGE UP (ref 8.4–10.5)
CHLORIDE SERPL-SCNC: 113 MMOL/L — HIGH (ref 96–108)
CO2 SERPL-SCNC: 23 MMOL/L — SIGNIFICANT CHANGE UP (ref 22–31)
CREAT SERPL-MCNC: 0.98 MG/DL — SIGNIFICANT CHANGE UP (ref 0.5–1.3)
CULTURE RESULTS: SIGNIFICANT CHANGE UP
CULTURE RESULTS: SIGNIFICANT CHANGE UP
GLUCOSE BLDC GLUCOMTR-MCNC: 152 MG/DL — HIGH (ref 70–99)
GLUCOSE BLDC GLUCOMTR-MCNC: 178 MG/DL — HIGH (ref 70–99)
GLUCOSE BLDC GLUCOMTR-MCNC: 190 MG/DL — HIGH (ref 70–99)
GLUCOSE SERPL-MCNC: 116 MG/DL — HIGH (ref 70–99)
HCT VFR BLD CALC: 39.6 % — SIGNIFICANT CHANGE UP (ref 34.5–45)
HGB BLD-MCNC: 13 G/DL — SIGNIFICANT CHANGE UP (ref 11.5–15.5)
MCHC RBC-ENTMCNC: 29 PG — SIGNIFICANT CHANGE UP (ref 27–34)
MCHC RBC-ENTMCNC: 32.8 GM/DL — SIGNIFICANT CHANGE UP (ref 32–36)
MCV RBC AUTO: 88.2 FL — SIGNIFICANT CHANGE UP (ref 80–100)
NRBC # BLD: 0 /100 WBCS — SIGNIFICANT CHANGE UP (ref 0–0)
PLATELET # BLD AUTO: 383 K/UL — SIGNIFICANT CHANGE UP (ref 150–400)
POTASSIUM SERPL-MCNC: 4.1 MMOL/L — SIGNIFICANT CHANGE UP (ref 3.5–5.3)
POTASSIUM SERPL-SCNC: 4.1 MMOL/L — SIGNIFICANT CHANGE UP (ref 3.5–5.3)
PROT SERPL-MCNC: 6.8 G/DL — SIGNIFICANT CHANGE UP (ref 6–8.3)
RBC # BLD: 4.49 M/UL — SIGNIFICANT CHANGE UP (ref 3.8–5.2)
RBC # FLD: 13.7 % — SIGNIFICANT CHANGE UP (ref 10.3–14.5)
SARS-COV-2 RNA SPEC QL NAA+PROBE: DETECTED
SODIUM SERPL-SCNC: 144 MMOL/L — SIGNIFICANT CHANGE UP (ref 135–145)
SPECIMEN SOURCE: SIGNIFICANT CHANGE UP
SPECIMEN SOURCE: SIGNIFICANT CHANGE UP
WBC # BLD: 8.32 K/UL — SIGNIFICANT CHANGE UP (ref 3.8–10.5)
WBC # FLD AUTO: 8.32 K/UL — SIGNIFICANT CHANGE UP (ref 3.8–10.5)

## 2022-01-03 PROCEDURE — 99233 SBSQ HOSP IP/OBS HIGH 50: CPT

## 2022-01-03 RX ADMIN — Medication 600 MILLIGRAM(S): at 05:44

## 2022-01-03 RX ADMIN — Medication 6 MILLIGRAM(S): at 05:43

## 2022-01-03 RX ADMIN — ENOXAPARIN SODIUM 40 MILLIGRAM(S): 100 INJECTION SUBCUTANEOUS at 05:43

## 2022-01-03 RX ADMIN — ENOXAPARIN SODIUM 40 MILLIGRAM(S): 100 INJECTION SUBCUTANEOUS at 17:34

## 2022-01-03 RX ADMIN — PANTOPRAZOLE SODIUM 40 MILLIGRAM(S): 20 TABLET, DELAYED RELEASE ORAL at 05:44

## 2022-01-03 NOTE — PHYSICAL THERAPY INITIAL EVALUATION ADULT - GENERAL OBSERVATIONS, REHAB EVAL
awake, alert, NAD; currently breathing on room air, no oxygen desaturation noted even with activity; + peripheral IV access on left forearm

## 2022-01-03 NOTE — PHYSICAL THERAPY INITIAL EVALUATION ADULT - DIAGNOSIS, PT EVAL
Debility and generalized deconditioning due to COVID-19 infection; impaired strength, endurance, mobility, and function

## 2022-01-03 NOTE — PHYSICAL THERAPY INITIAL EVALUATION ADULT - PATIENT/FAMILY/SIGNIFICANT OTHER GOALS STATEMENT, PT EVAL
to return home and be able to perform usual mobility and functional activities without shortness of breath or difficulty; without an assistive device

## 2022-01-03 NOTE — PHYSICAL THERAPY INITIAL EVALUATION ADULT - MODALITIES TREATMENT COMMENTS
patient is currently unable to participate in a 6-minute walk test, 5STS, or repeated STS due to generalized weakness and fatigue; patient is currently breathing on room air with no oxygen desaturation noted

## 2022-01-04 LAB
ANION GAP SERPL CALC-SCNC: 5 MMOL/L — SIGNIFICANT CHANGE UP (ref 5–17)
BUN SERPL-MCNC: 30 MG/DL — HIGH (ref 7–18)
CALCIUM SERPL-MCNC: 9 MG/DL — SIGNIFICANT CHANGE UP (ref 8.4–10.5)
CHLORIDE SERPL-SCNC: 113 MMOL/L — HIGH (ref 96–108)
CO2 SERPL-SCNC: 26 MMOL/L — SIGNIFICANT CHANGE UP (ref 22–31)
CREAT SERPL-MCNC: 0.94 MG/DL — SIGNIFICANT CHANGE UP (ref 0.5–1.3)
GLUCOSE BLDC GLUCOMTR-MCNC: 116 MG/DL — HIGH (ref 70–99)
GLUCOSE BLDC GLUCOMTR-MCNC: 120 MG/DL — HIGH (ref 70–99)
GLUCOSE BLDC GLUCOMTR-MCNC: 157 MG/DL — HIGH (ref 70–99)
GLUCOSE BLDC GLUCOMTR-MCNC: 176 MG/DL — HIGH (ref 70–99)
GLUCOSE SERPL-MCNC: 100 MG/DL — HIGH (ref 70–99)
HCT VFR BLD CALC: 41.6 % — SIGNIFICANT CHANGE UP (ref 34.5–45)
HGB BLD-MCNC: 13.7 G/DL — SIGNIFICANT CHANGE UP (ref 11.5–15.5)
MAGNESIUM SERPL-MCNC: 2.4 MG/DL — SIGNIFICANT CHANGE UP (ref 1.6–2.6)
MCHC RBC-ENTMCNC: 29.1 PG — SIGNIFICANT CHANGE UP (ref 27–34)
MCHC RBC-ENTMCNC: 32.9 GM/DL — SIGNIFICANT CHANGE UP (ref 32–36)
MCV RBC AUTO: 88.5 FL — SIGNIFICANT CHANGE UP (ref 80–100)
NRBC # BLD: 0 /100 WBCS — SIGNIFICANT CHANGE UP (ref 0–0)
PHOSPHATE SERPL-MCNC: 2.6 MG/DL — SIGNIFICANT CHANGE UP (ref 2.5–4.5)
PLATELET # BLD AUTO: 456 K/UL — HIGH (ref 150–400)
POTASSIUM SERPL-MCNC: 3.7 MMOL/L — SIGNIFICANT CHANGE UP (ref 3.5–5.3)
POTASSIUM SERPL-SCNC: 3.7 MMOL/L — SIGNIFICANT CHANGE UP (ref 3.5–5.3)
RBC # BLD: 4.7 M/UL — SIGNIFICANT CHANGE UP (ref 3.8–5.2)
RBC # FLD: 14 % — SIGNIFICANT CHANGE UP (ref 10.3–14.5)
SODIUM SERPL-SCNC: 144 MMOL/L — SIGNIFICANT CHANGE UP (ref 135–145)
WBC # BLD: 10.02 K/UL — SIGNIFICANT CHANGE UP (ref 3.8–10.5)
WBC # FLD AUTO: 10.02 K/UL — SIGNIFICANT CHANGE UP (ref 3.8–10.5)

## 2022-01-04 PROCEDURE — 99233 SBSQ HOSP IP/OBS HIGH 50: CPT

## 2022-01-04 RX ADMIN — Medication 1: at 17:03

## 2022-01-04 RX ADMIN — ENOXAPARIN SODIUM 40 MILLIGRAM(S): 100 INJECTION SUBCUTANEOUS at 17:04

## 2022-01-04 RX ADMIN — Medication 6 MILLIGRAM(S): at 05:22

## 2022-01-04 RX ADMIN — Medication 1: at 12:14

## 2022-01-04 RX ADMIN — ENOXAPARIN SODIUM 40 MILLIGRAM(S): 100 INJECTION SUBCUTANEOUS at 05:22

## 2022-01-04 RX ADMIN — PANTOPRAZOLE SODIUM 40 MILLIGRAM(S): 20 TABLET, DELAYED RELEASE ORAL at 05:22

## 2022-01-04 NOTE — DIETITIAN INITIAL EVALUATION ADULT. - EDUCATION DIETARY MODIFICATIONS
ED Provider Note    Scribed for Collins Bianchi M.D. by Katrina Youngblood. [facility]  [address]  [place] [address]  [phone]    of, Collins Bianchi M.D..    Electronically signed by: Katrina Youngblood (Scribe), 9/25/2017    Collins MOCK not applicable

## 2022-01-04 NOTE — DIETITIAN INITIAL EVALUATION ADULT. - PROBLEM SELECTOR PLAN 1
Acute hypoxic respiratory failure 2/2 COVID  COVID+ 12/25  saturating 88% on room air, improved to 95% on 4L NC  s/p ceftriaxone, azithromycin in ED  will start remdesivir  will start decadron  Lovenox 40 mg SQ bid for dvt ppx given COVID algorithm  PRN tylenol for fevers  PRN robitussin for cough  PRN zofran for nausea (QTc 424 ms)

## 2022-01-04 NOTE — DIETITIAN INITIAL EVALUATION ADULT. - PERTINENT LABORATORY DATA
01-04 Na144 mmol/L Glu 100 mg/dL<H> K+ 3.7 mmol/L Cr  0.94 mg/dL BUN 30 mg/dL<H>   01-04 Phos 2.6 mg/dL   01-03 Alb 2.8 g/dL<L>     12-30 Chol 126 mg/dL LDL --    HDL 33 mg/dL<L> Trig 105 mg/dL    12-30-21 @ 11:41 HgbA1C 6.3

## 2022-01-04 NOTE — DIETITIAN INITIAL EVALUATION ADULT. - FACTORS AFF FOOD INTAKE
weakness, cough, fever, SOB, HTN, arthritis, cataract/difficulty with food procurement/preparation/persistent nausea/Restoration/ethnic/cultural/personal food preferences/other (specify)

## 2022-01-04 NOTE — DIETITIAN INITIAL EVALUATION ADULT. - PERTINENT MEDS FT
MEDICATIONS  (STANDING):  dexAMETHasone  Injectable 6 milliGRAM(s) IV Push daily  dextrose 40% Gel 15 Gram(s) Oral once  dextrose 5%. 1000 milliLiter(s) (50 mL/Hr) IV Continuous <Continuous>  dextrose 5%. 1000 milliLiter(s) (100 mL/Hr) IV Continuous <Continuous>  dextrose 50% Injectable 25 Gram(s) IV Push once  dextrose 50% Injectable 12.5 Gram(s) IV Push once  dextrose 50% Injectable 25 Gram(s) IV Push once  enoxaparin Injectable 40 milliGRAM(s) SubCutaneous every 12 hours  glucagon  Injectable 1 milliGRAM(s) IntraMuscular once  insulin lispro (ADMELOG) corrective regimen sliding scale   SubCutaneous three times a day before meals  pantoprazole    Tablet 40 milliGRAM(s) Oral before breakfast    MEDICATIONS  (PRN):  acetaminophen     Tablet .. 650 milliGRAM(s) Oral every 6 hours PRN Temp greater or equal to 38C (100.4F)  guaiFENesin Oral Liquid (Sugar-Free) 100 milliGRAM(s) Oral every 6 hours PRN Cough  ondansetron Injectable 4 milliGRAM(s) IV Push every 6 hours PRN Nausea and/or Vomiting

## 2022-01-04 NOTE — DIETITIAN INITIAL EVALUATION ADULT. - OTHER INFO
Patient from home & admitted for 8 days of productive cough/SOB w/nausea & COVID+, on airborne precautions, in isolation, unable to see, contacted bedside phone - presently no response, d/w nursing staff, reports of 40-50% of meals, on nasal cannula, able to sit in chair today, no GI distress, plans for ARIANA placement & awaiting, encourage po intake, skin intact, no edema.

## 2022-01-05 LAB
ANION GAP SERPL CALC-SCNC: 5 MMOL/L — SIGNIFICANT CHANGE UP (ref 5–17)
BUN SERPL-MCNC: 31 MG/DL — HIGH (ref 7–18)
CALCIUM SERPL-MCNC: 8.9 MG/DL — SIGNIFICANT CHANGE UP (ref 8.4–10.5)
CHLORIDE SERPL-SCNC: 114 MMOL/L — HIGH (ref 96–108)
CO2 SERPL-SCNC: 27 MMOL/L — SIGNIFICANT CHANGE UP (ref 22–31)
CREAT SERPL-MCNC: 0.95 MG/DL — SIGNIFICANT CHANGE UP (ref 0.5–1.3)
GLUCOSE BLDC GLUCOMTR-MCNC: 113 MG/DL — HIGH (ref 70–99)
GLUCOSE BLDC GLUCOMTR-MCNC: 144 MG/DL — HIGH (ref 70–99)
GLUCOSE BLDC GLUCOMTR-MCNC: 148 MG/DL — HIGH (ref 70–99)
GLUCOSE BLDC GLUCOMTR-MCNC: 175 MG/DL — HIGH (ref 70–99)
GLUCOSE SERPL-MCNC: 109 MG/DL — HIGH (ref 70–99)
HCT VFR BLD CALC: 39.9 % — SIGNIFICANT CHANGE UP (ref 34.5–45)
HGB BLD-MCNC: 13.3 G/DL — SIGNIFICANT CHANGE UP (ref 11.5–15.5)
MAGNESIUM SERPL-MCNC: 2.5 MG/DL — SIGNIFICANT CHANGE UP (ref 1.6–2.6)
MCHC RBC-ENTMCNC: 29.4 PG — SIGNIFICANT CHANGE UP (ref 27–34)
MCHC RBC-ENTMCNC: 33.3 GM/DL — SIGNIFICANT CHANGE UP (ref 32–36)
MCV RBC AUTO: 88.1 FL — SIGNIFICANT CHANGE UP (ref 80–100)
NRBC # BLD: 0 /100 WBCS — SIGNIFICANT CHANGE UP (ref 0–0)
PHOSPHATE SERPL-MCNC: 3.3 MG/DL — SIGNIFICANT CHANGE UP (ref 2.5–4.5)
PLATELET # BLD AUTO: 473 K/UL — HIGH (ref 150–400)
POTASSIUM SERPL-MCNC: 4.6 MMOL/L — SIGNIFICANT CHANGE UP (ref 3.5–5.3)
POTASSIUM SERPL-SCNC: 4.6 MMOL/L — SIGNIFICANT CHANGE UP (ref 3.5–5.3)
RBC # BLD: 4.53 M/UL — SIGNIFICANT CHANGE UP (ref 3.8–5.2)
RBC # FLD: 14.2 % — SIGNIFICANT CHANGE UP (ref 10.3–14.5)
SODIUM SERPL-SCNC: 146 MMOL/L — HIGH (ref 135–145)
WBC # BLD: 11.16 K/UL — HIGH (ref 3.8–10.5)
WBC # FLD AUTO: 11.16 K/UL — HIGH (ref 3.8–10.5)

## 2022-01-05 PROCEDURE — 99232 SBSQ HOSP IP/OBS MODERATE 35: CPT

## 2022-01-05 RX ADMIN — Medication 1: at 11:35

## 2022-01-05 RX ADMIN — ENOXAPARIN SODIUM 40 MILLIGRAM(S): 100 INJECTION SUBCUTANEOUS at 17:20

## 2022-01-05 RX ADMIN — PANTOPRAZOLE SODIUM 40 MILLIGRAM(S): 20 TABLET, DELAYED RELEASE ORAL at 05:36

## 2022-01-05 RX ADMIN — ENOXAPARIN SODIUM 40 MILLIGRAM(S): 100 INJECTION SUBCUTANEOUS at 05:36

## 2022-01-05 RX ADMIN — Medication 6 MILLIGRAM(S): at 05:36

## 2022-01-05 NOTE — PROGRESS NOTE ADULT - PROBLEM SELECTOR PLAN 3
DVT PPX: Lovenox  GI PPX: PPI
Lovenox 40mg SC bid for DVT ppx (COVID protocol).
DVT PPX: Lovenox  GI PPX: PPI
Lovenox 40mg SC bid for DVT ppx (COVID protocol).
DVT PPX: Lovenox  GI PPX: PPI
Lovenox 40mg SC bid for DVT ppx (COVID protocol).
Lovenox 40mg SC bid for DVT ppx (COVID protocol).

## 2022-01-05 NOTE — PROGRESS NOTE ADULT - PROBLEM SELECTOR PLAN 2
SCr 0.94 (downtrending), baseline ~ 0.8  Avoid nephrotoxins  Follow up BMP in AM
SCr 0.98 (downtrending), baseline ~ 0.8  Avoid nephrotoxins  Follow up BMP in AM
Resolved- Cr 1.03
SCr 1.57 on admission  baseline SCr ~ 0.8  improved after 2.6 L NS bolus  Monitor BMP  Consider Renal US, urine lytes if not improving with IV hydration.
SCr 1.57 on admission  baseline SCr ~ 0.8  improved after 2.6 L NS bolus  Monitor BMP  Consider Renal US, urine lytes if not improving with IV hydration.
Resolved  SCr 0.95 (baseline ~ 0.8)  Avoid nephrotoxins  Follow up BMP in AM
Resolved- Cr 1.04

## 2022-01-05 NOTE — PROGRESS NOTE ADULT - SUBJECTIVE AND OBJECTIVE BOX
NP Note discussed with Primary Attending.    Patient is a 76y old  Female who presents with a chief complaint of COVID-19 (29 Dec 2021 18:16)      INTERVAL HPI/OVERNIGHT EVENTS: no new complaints    MEDICATIONS  (STANDING):  dexAMETHasone  Injectable 6 milliGRAM(s) IV Push daily  enoxaparin Injectable 40 milliGRAM(s) SubCutaneous every 12 hours  pantoprazole    Tablet 40 milliGRAM(s) Oral before breakfast  remdesivir  IVPB   IV Intermittent   remdesivir  IVPB 100 milliGRAM(s) IV Intermittent every 24 hours    MEDICATIONS  (PRN):  acetaminophen     Tablet .. 650 milliGRAM(s) Oral every 6 hours PRN Temp greater or equal to 38C (100.4F)  guaiFENesin Oral Liquid (Sugar-Free) 100 milliGRAM(s) Oral every 6 hours PRN Cough  ondansetron Injectable 4 milliGRAM(s) IV Push every 6 hours PRN Nausea and/or Vomiting      __________________________________________________  REVIEW OF SYSTEMS:    CONSTITUTIONAL: No fever,   EYES: no acute visual disturbances  NECK: No pain or stiffness  RESPIRATORY: No cough; No shortness of breath  CARDIOVASCULAR: No chest pain, no palpitations  GASTROINTESTINAL: No pain. No nausea or vomiting; No diarrhea   NEUROLOGICAL: No headache or numbness, no tremors  MUSCULOSKELETAL: No joint pain, no muscle pain  GENITOURINARY: no dysuria, no frequency, no hesitancy  PSYCHIATRY: no depression , no anxiety  ALL OTHER  ROS negative        Vital Signs Last 24 Hrs  T(C): 36.7 (30 Dec 2021 14:06), Max: 37.2 (30 Dec 2021 05:04)  T(F): 98 (30 Dec 2021 14:06), Max: 99 (30 Dec 2021 06:56)  HR: 76 (30 Dec 2021 14:06) (76 - 96)  BP: 134/67 (30 Dec 2021 14:06) (102/65 - 140/72)  BP(mean): --  RR: 18 (30 Dec 2021 14:06) (17 - 22)  SpO2: 95% (30 Dec 2021 14:06) (94% - 100%)    ________________________________________________  PHYSICAL EXAM:  GENERAL: NAD  HEENT: Normocephalic;  conjunctivae and sclerae clear; moist mucous membranes;   NECK : supple  CHEST/LUNG: Clear to auscultation bilaterally with good air entry   HEART: S1 S2  regular; no murmurs, gallops or rubs  ABDOMEN: Soft, Nontender, Nondistended; Bowel sounds present  EXTREMITIES: no cyanosis; no edema; no calf tenderness  SKIN: warm and dry; no rash  NERVOUS SYSTEM:  Awake and alert; Oriented  to place, person and time ; no new deficits    _________________________________________________  LABS:                        12.4   6.32  )-----------( 236      ( 30 Dec 2021 06:59 )             37.3     12-30    145  |  115<H>  |  23<H>  ----------------------------<  103<H>  4.8   |  26  |  1.11    Ca    8.5      30 Dec 2021 06:59  Phos  2.9     12-30  Mg     2.4     12-30    TPro  7.4  /  Alb  2.7<L>  /  TBili  0.3  /  DBili  x   /  AST  42<H>  /  ALT  27  /  AlkPhos  54  12-30    PT/INR - ( 29 Dec 2021 17:15 )   PT: 12.8 sec;   INR: 1.08 ratio         PTT - ( 29 Dec 2021 17:15 )  PTT:49.9 sec    CAPILLARY BLOOD GLUCOSE            RADIOLOGY & ADDITIONAL TESTS:    Imaging  Reviewed:  YES/NO    Consultant(s) Notes Reviewed:   YES/ No      Plan of care was discussed with patient and /or primary care giver; all questions and concerns were addressed 
  Patient is a 76y old  Female who presents with a chief complaint of COVID-19 (31 Dec 2021 00:05)      INTERVAL HPI/OVERNIGHT EVENTS: no new complaints    MEDICATIONS  (STANDING):  dexAMETHasone  Injectable 6 milliGRAM(s) IV Push daily  dextrose 40% Gel 15 Gram(s) Oral once  dextrose 5%. 1000 milliLiter(s) (50 mL/Hr) IV Continuous <Continuous>  dextrose 5%. 1000 milliLiter(s) (100 mL/Hr) IV Continuous <Continuous>  dextrose 50% Injectable 25 Gram(s) IV Push once  dextrose 50% Injectable 12.5 Gram(s) IV Push once  dextrose 50% Injectable 25 Gram(s) IV Push once  enoxaparin Injectable 40 milliGRAM(s) SubCutaneous every 12 hours  glucagon  Injectable 1 milliGRAM(s) IntraMuscular once  guaiFENesin  milliGRAM(s) Oral every 12 hours  insulin lispro (ADMELOG) corrective regimen sliding scale   SubCutaneous three times a day before meals  pantoprazole    Tablet 40 milliGRAM(s) Oral before breakfast  remdesivir  IVPB   IV Intermittent   remdesivir  IVPB 100 milliGRAM(s) IV Intermittent every 24 hours    MEDICATIONS  (PRN):  acetaminophen     Tablet .. 650 milliGRAM(s) Oral every 6 hours PRN Temp greater or equal to 38C (100.4F)  guaiFENesin Oral Liquid (Sugar-Free) 100 milliGRAM(s) Oral every 6 hours PRN Cough  ondansetron Injectable 4 milliGRAM(s) IV Push every 6 hours PRN Nausea and/or Vomiting      __________________________________________________  REVIEW OF SYSTEMS:    CONSTITUTIONAL: No fever,   EYES: no acute visual disturbances  NECK: No pain or stiffness  RESPIRATORY: No cough; No shortness of breath  CARDIOVASCULAR: No chest pain, no palpitations  GASTROINTESTINAL: No pain. No nausea or vomiting; No diarrhea   NEUROLOGICAL: No headache or numbness, no tremors  MUSCULOSKELETAL: No joint pain, no muscle pain  GENITOURINARY: no dysuria, no frequency, no hesitancy  PSYCHIATRY: no depression , no anxiety  ALL OTHER  ROS negative        Vital Signs Last 24 Hrs  126/71, P 71. T 98.4F  O2 sat 97% on 2L NC    ________________________________________________  PHYSICAL EXAM:  GENERAL: NAD  HEENT: Normocephalic;  conjunctivae and sclerae clear; moist mucous membranes;   NECK : supple  CHEST/LUNG: Clear to auscultation bilaterally with good air entry   HEART: S1 S2  regular; no murmurs, gallops or rubs  ABDOMEN: Soft, Nontender, Nondistended; Bowel sounds present  EXTREMITIES: no cyanosis; no edema; no calf tenderness  SKIN: warm and dry; no rash  NERVOUS SYSTEM:  Awake and alert; Oriented  to place, person and time ; no new deficits    _________________________________________________        RADIOLOGY & ADDITIONAL TESTS:  ACC: [id number] EXAM:  XR CHEST AP OR PA 1V                          PROCEDURE DATE:  12/29/2021          INTERPRETATION:  Chest one view    HISTORY: Sepsis    COMPARISON STUDY: 2/14/2011    Frontal expiratory view of the chest shows the heart to be normal in   size. The lungs show patchy infiltrates of the lower lungs, left more   than right and there is no evidence of pneumothorax nor pleural effusion.    IMPRESSION:  Patchy lower infiltrates.        Thank you for the courtesy of this referral.    --- End of Report ---      SAÚL BURRELL MD; Attending Interventional Radiologist  This document has been electronically signed. Dec 30 2021  8:40AM      Imaging  Reviewed:  YES/NO    Consultant(s) Notes Reviewed:   YES/ No      Plan of care was discussed with patient and /or primary care giver; all questions and concerns were addressed 
HPI:  76 YOF admitted for COVID.  SpO2 97% on RA.    OVERNIGHT EVENTS:  No new overnight events.  Seen and examined at bedside.     REVIEW OF SYSTEMS:      CONSTITUTIONAL: No fever  EYES: no acute visual disturbances  NECK: No pain or stiffness  RESPIRATORY: No cough; shortness of breath resolved  CARDIOVASCULAR: No chest pain, no palpitations  GASTROINTESTINAL: No pain. No nausea, vomiting or diarrhea   NEUROLOGICAL: No headache or numbness, no tremors  MUSCULOSKELETAL: No joint pain, no muscle pain  GENITOURINARY: no dysuria, no frequency, no hesitancy  PSYCHIATRY: no depression, no anxiety  ALL OTHER  ROS negative        Vital Signs Last 24 Hrs  T(C): 36.6 (05 Jan 2022 13:05), Max: 36.9 (04 Jan 2022 21:06)  T(F): 97.8 (05 Jan 2022 13:05), Max: 98.4 (04 Jan 2022 21:06)  HR: 91 (05 Jan 2022 13:05) (63 - 91)  BP: 105/76 (05 Jan 2022 13:05) (105/76 - 137/73)  BP(mean): --  RR: 18 (05 Jan 2022 13:05) (18 - 18)  SpO2: 98% (05 Jan 2022 13:05) (95% - 98%)    ________________________________________________  PHYSICAL EXAM:    GENERAL: NAD  HEENT: Normocephalic; conjunctivae and sclerae clear;  NECK : supple, no JVD  CHEST/LUNG: Clear to auscultation; Nonlabored  HEART: S1 S2  regular  ABDOMEN: Soft, Nontender, Nondistended; Bowel sounds present  EXTREMITIES: no cyanosis; no LE edema; no calf tenderness  SKIN: warm and dry; No rashes or lesions  NERVOUS SYSTEM:  Alert; no new deficits    _________________________________________________  CURRENT MEDICATIONS:    MEDICATIONS  (STANDING):  dexAMETHasone  Injectable 6 milliGRAM(s) IV Push daily  dextrose 40% Gel 15 Gram(s) Oral once  dextrose 5%. 1000 milliLiter(s) (50 mL/Hr) IV Continuous <Continuous>  dextrose 5%. 1000 milliLiter(s) (100 mL/Hr) IV Continuous <Continuous>  dextrose 50% Injectable 25 Gram(s) IV Push once  dextrose 50% Injectable 12.5 Gram(s) IV Push once  dextrose 50% Injectable 25 Gram(s) IV Push once  enoxaparin Injectable 40 milliGRAM(s) SubCutaneous every 12 hours  glucagon  Injectable 1 milliGRAM(s) IntraMuscular once  insulin lispro (ADMELOG) corrective regimen sliding scale   SubCutaneous three times a day before meals  pantoprazole    Tablet 40 milliGRAM(s) Oral before breakfast    MEDICATIONS  (PRN):  acetaminophen     Tablet .. 650 milliGRAM(s) Oral every 6 hours PRN Temp greater or equal to 38C (100.4F)  guaiFENesin Oral Liquid (Sugar-Free) 100 milliGRAM(s) Oral every 6 hours PRN Cough  ondansetron Injectable 4 milliGRAM(s) IV Push every 6 hours PRN Nausea and/or Vomiting      __________________________________________________  LABS:                          13.3   11.16 )-----------( 473      ( 05 Jan 2022 06:35 )             39.9     01-05    146<H>  |  114<H>  |  31<H>  ----------------------------<  109<H>  4.6   |  27  |  0.95    Ca    8.9      05 Jan 2022 06:35  Phos  3.3     01-05  Mg     2.5     01-05          CAPILLARY BLOOD GLUCOSE      POCT Blood Glucose.: 175 mg/dL (05 Jan 2022 11:22)  POCT Blood Glucose.: 113 mg/dL (05 Jan 2022 07:33)  POCT Blood Glucose.: 120 mg/dL (04 Jan 2022 21:17)  POCT Blood Glucose.: 176 mg/dL (04 Jan 2022 16:47)      __________________________________________________  RADIOLOGY & ADDITIONAL TESTS:    Imaging Personally Reviewed:  YES    < from: Xray Chest 1 View AP/PA (12.29.21 @ 16:44) >  IMPRESSION:  Patchy lower infiltrates.    < end of copied text >    Consultant(s) Notes Reviewed:   YES     Plan of care was discussed with patient and /or primary care giver; all questions and concerns were addressed and care was aligned with patient's wishes.    Plan discussed with attending and consulting physicians.  
NP Note discussed with Primary Attending.      Patient is a 76y old  Female who presents with a chief complaint of COVID-19 (31 Dec 2021 00:05)      INTERVAL HPI/OVERNIGHT EVENTS: no new complaints    MEDICATIONS  (STANDING):  dexAMETHasone  Injectable 6 milliGRAM(s) IV Push daily  dextrose 40% Gel 15 Gram(s) Oral once  dextrose 5%. 1000 milliLiter(s) (50 mL/Hr) IV Continuous <Continuous>  dextrose 5%. 1000 milliLiter(s) (100 mL/Hr) IV Continuous <Continuous>  dextrose 50% Injectable 25 Gram(s) IV Push once  dextrose 50% Injectable 12.5 Gram(s) IV Push once  dextrose 50% Injectable 25 Gram(s) IV Push once  enoxaparin Injectable 40 milliGRAM(s) SubCutaneous every 12 hours  glucagon  Injectable 1 milliGRAM(s) IntraMuscular once  guaiFENesin  milliGRAM(s) Oral every 12 hours  insulin lispro (ADMELOG) corrective regimen sliding scale   SubCutaneous three times a day before meals  pantoprazole    Tablet 40 milliGRAM(s) Oral before breakfast  remdesivir  IVPB   IV Intermittent   remdesivir  IVPB 100 milliGRAM(s) IV Intermittent every 24 hours    MEDICATIONS  (PRN):  acetaminophen     Tablet .. 650 milliGRAM(s) Oral every 6 hours PRN Temp greater or equal to 38C (100.4F)  guaiFENesin Oral Liquid (Sugar-Free) 100 milliGRAM(s) Oral every 6 hours PRN Cough  ondansetron Injectable 4 milliGRAM(s) IV Push every 6 hours PRN Nausea and/or Vomiting      __________________________________________________  REVIEW OF SYSTEMS:    CONSTITUTIONAL: No fever,   EYES: no acute visual disturbances  NECK: No pain or stiffness  RESPIRATORY: No cough; No shortness of breath  CARDIOVASCULAR: No chest pain, no palpitations  GASTROINTESTINAL: No pain. No nausea or vomiting; No diarrhea   NEUROLOGICAL: No headache or numbness, no tremors  MUSCULOSKELETAL: No joint pain, no muscle pain  GENITOURINARY: no dysuria, no frequency, no hesitancy  PSYCHIATRY: no depression , no anxiety  ALL OTHER  ROS negative        Vital Signs Last 24 Hrs  T(C): 36.8 (31 Dec 2021 05:01), Max: 36.8 (30 Dec 2021 19:40)  T(F): 98.3 (31 Dec 2021 05:01), Max: 98.3 (31 Dec 2021 05:01)  HR: 72 (31 Dec 2021 05:01) (72 - 100)  BP: 124/74 (31 Dec 2021 05:01) (117/70 - 134/67)  BP(mean): --  RR: 18 (31 Dec 2021 05:01) (18 - 18)  SpO2: 92% (31 Dec 2021 05:01) (92% - 95%)    ________________________________________________  PHYSICAL EXAM:  GENERAL: NAD  HEENT: Normocephalic;  conjunctivae and sclerae clear; moist mucous membranes;   NECK : supple  CHEST/LUNG: Clear to auscultation bilaterally with good air entry   HEART: S1 S2  regular; no murmurs, gallops or rubs  ABDOMEN: Soft, Nontender, Nondistended; Bowel sounds present  EXTREMITIES: no cyanosis; no edema; no calf tenderness  SKIN: warm and dry; no rash  NERVOUS SYSTEM:  Awake and alert; Oriented  to place, person and time ; no new deficits    _________________________________________________  LABS:                        12.3   3.91  )-----------( 261      ( 31 Dec 2021 06:21 )             37.3     12-31    145  |  114<H>  |  25<H>  ----------------------------<  111<H>  4.5   |  24  |  1.07    Ca    9.0      31 Dec 2021 06:21  Phos  2.9     12-30  Mg     2.4     12-30    TPro  7.2  /  Alb  2.6<L>  /  TBili  0.2  /  DBili  x   /  AST  38  /  ALT  30  /  AlkPhos  54  12-31    PT/INR - ( 29 Dec 2021 17:15 )   PT: 12.8 sec;   INR: 1.08 ratio         PTT - ( 29 Dec 2021 17:15 )  PTT:49.9 sec    CAPILLARY BLOOD GLUCOSE            RADIOLOGY & ADDITIONAL TESTS:  ACC: [id number] EXAM:  XR CHEST AP OR PA 1V                          PROCEDURE DATE:  12/29/2021          INTERPRETATION:  Chest one view    HISTORY: Sepsis    COMPARISON STUDY: 2/14/2011    Frontal expiratory view of the chest shows the heart to be normal in   size. The lungs show patchy infiltrates of the lower lungs, left more   than right and there is no evidence of pneumothorax nor pleural effusion.    IMPRESSION:  Patchy lower infiltrates.        Thank you for the courtesy of this referral.    --- End of Report ---      SAÚL BURRELL MD; Attending Interventional Radiologist  This document has been electronically signed. Dec 30 2021  8:40AM      Imaging  Reviewed:  YES/NO    Consultant(s) Notes Reviewed:   YES/ No      Plan of care was discussed with patient and /or primary care giver; all questions and concerns were addressed 
  Patient is a 76y old  Female who presents with a chief complaint of COVID-19 (31 Dec 2021 00:05)      INTERVAL HPI/OVERNIGHT EVENTS: no new complaints    MEDICATIONS  (STANDING):  dexAMETHasone  Injectable 6 milliGRAM(s) IV Push daily  dextrose 40% Gel 15 Gram(s) Oral once  dextrose 5%. 1000 milliLiter(s) (50 mL/Hr) IV Continuous <Continuous>  dextrose 5%. 1000 milliLiter(s) (100 mL/Hr) IV Continuous <Continuous>  dextrose 50% Injectable 25 Gram(s) IV Push once  dextrose 50% Injectable 12.5 Gram(s) IV Push once  dextrose 50% Injectable 25 Gram(s) IV Push once  enoxaparin Injectable 40 milliGRAM(s) SubCutaneous every 12 hours  glucagon  Injectable 1 milliGRAM(s) IntraMuscular once  guaiFENesin  milliGRAM(s) Oral every 12 hours  insulin lispro (ADMELOG) corrective regimen sliding scale   SubCutaneous three times a day before meals  pantoprazole    Tablet 40 milliGRAM(s) Oral before breakfast  remdesivir  IVPB   IV Intermittent   remdesivir  IVPB 100 milliGRAM(s) IV Intermittent every 24 hours    MEDICATIONS  (PRN):  acetaminophen     Tablet .. 650 milliGRAM(s) Oral every 6 hours PRN Temp greater or equal to 38C (100.4F)  guaiFENesin Oral Liquid (Sugar-Free) 100 milliGRAM(s) Oral every 6 hours PRN Cough  ondansetron Injectable 4 milliGRAM(s) IV Push every 6 hours PRN Nausea and/or Vomiting      __________________________________________________  REVIEW OF SYSTEMS:    CONSTITUTIONAL: No fever,   EYES: no acute visual disturbances  NECK: No pain or stiffness  RESPIRATORY: No cough; No shortness of breath  CARDIOVASCULAR: No chest pain, no palpitations  GASTROINTESTINAL: No pain. No nausea or vomiting; No diarrhea   NEUROLOGICAL: No headache or numbness, no tremors  MUSCULOSKELETAL: No joint pain, no muscle pain  GENITOURINARY: no dysuria, no frequency, no hesitancy  PSYCHIATRY: no depression , no anxiety  ALL OTHER  ROS negative        Vital Signs Last 24 Hrs  148/86, P 65. T 97.7F  O2 sat 99% on 2L NC    ________________________________________________  PHYSICAL EXAM:  GENERAL: NAD  HEENT: Normocephalic;  conjunctivae and sclerae clear; moist mucous membranes;   NECK : supple  CHEST/LUNG: Clear to auscultation bilaterally with good air entry   HEART: S1 S2  regular; no murmurs, gallops or rubs  ABDOMEN: Soft, Nontender, Nondistended; Bowel sounds present  EXTREMITIES: no cyanosis; no edema; no calf tenderness  SKIN: warm and dry; no rash  NERVOUS SYSTEM:  Awake and alert; Oriented  to place, person and time ; no new deficits    _________________________________________________        RADIOLOGY & ADDITIONAL TESTS:  ACC: [id number] EXAM:  XR CHEST AP OR PA 1V                          PROCEDURE DATE:  12/29/2021          INTERPRETATION:  Chest one view    HISTORY: Sepsis    COMPARISON STUDY: 2/14/2011    Frontal expiratory view of the chest shows the heart to be normal in   size. The lungs show patchy infiltrates of the lower lungs, left more   than right and there is no evidence of pneumothorax nor pleural effusion.    IMPRESSION:  Patchy lower infiltrates.        Thank you for the courtesy of this referral.    --- End of Report ---      SAÚL BURRELL MD; Attending Interventional Radiologist  This document has been electronically signed. Dec 30 2021  8:40AM      Imaging  Reviewed:  YES/NO    Consultant(s) Notes Reviewed:   YES/ No      Plan of care was discussed with patient and /or primary care giver; all questions and concerns were addressed 
HPI:  76 YOF admitted with AHRF secondary to COVID.  Rem and Dex started.  SpO2 100% on room air.    OVERNIGHT EVENTS:  No new overnight events.  Seen and examined at bedside.     REVIEW OF SYSTEMS:      CONSTITUTIONAL: No fever  EYES: no acute visual disturbances  NECK: No pain or stiffness  RESPIRATORY: No cough; shortness of breath resolving  CARDIOVASCULAR: No chest pain, no palpitations  GASTROINTESTINAL: No pain. No nausea, vomiting or diarrhea   NEUROLOGICAL: No headache or numbness, no tremors  MUSCULOSKELETAL: No joint pain, no muscle pain  GENITOURINARY: no dysuria, no frequency, no hesitancy  PSYCHIATRY: no depression, no anxiety  ALL OTHER  ROS negative        Vital Signs Last 24 Hrs  T(C): 37.4 (03 Jan 2022 12:44), Max: 37.4 (03 Jan 2022 12:44)  T(F): 99.3 (03 Jan 2022 12:44), Max: 99.3 (03 Jan 2022 12:44)  HR: 99 (03 Jan 2022 14:55) (66 - 99)  BP: 111/78 (03 Jan 2022 14:55) (109/50 - 146/86)  BP(mean): 85 (03 Jan 2022 14:55) (85 - 85)  RR: 20 (03 Jan 2022 14:55) (18 - 20)  SpO2: 100% (03 Jan 2022 14:55) (90% - 100%)    ________________________________________________  PHYSICAL EXAM:    GENERAL: NAD  HEENT: Normocephalic; conjunctivae and sclerae clear;  NECK : supple, no JVD  CHEST/LUNG: Clear to auscultation; Nonlabored  HEART: S1 S2  regular  ABDOMEN: Soft, Nontender, Nondistended; Bowel sounds present  EXTREMITIES: no cyanosis; no LE edema; no calf tenderness  SKIN: warm and dry; No rashes or lesions  NERVOUS SYSTEM:  Alert; no new deficits    _________________________________________________  CURRENT MEDICATIONS:    MEDICATIONS  (STANDING):  dexAMETHasone  Injectable 6 milliGRAM(s) IV Push daily  dextrose 40% Gel 15 Gram(s) Oral once  dextrose 5%. 1000 milliLiter(s) (50 mL/Hr) IV Continuous <Continuous>  dextrose 5%. 1000 milliLiter(s) (100 mL/Hr) IV Continuous <Continuous>  dextrose 50% Injectable 25 Gram(s) IV Push once  dextrose 50% Injectable 12.5 Gram(s) IV Push once  dextrose 50% Injectable 25 Gram(s) IV Push once  enoxaparin Injectable 40 milliGRAM(s) SubCutaneous every 12 hours  glucagon  Injectable 1 milliGRAM(s) IntraMuscular once  insulin lispro (ADMELOG) corrective regimen sliding scale   SubCutaneous three times a day before meals  pantoprazole    Tablet 40 milliGRAM(s) Oral before breakfast    MEDICATIONS  (PRN):  acetaminophen     Tablet .. 650 milliGRAM(s) Oral every 6 hours PRN Temp greater or equal to 38C (100.4F)  guaiFENesin Oral Liquid (Sugar-Free) 100 milliGRAM(s) Oral every 6 hours PRN Cough  ondansetron Injectable 4 milliGRAM(s) IV Push every 6 hours PRN Nausea and/or Vomiting      __________________________________________________  LABS:                          13.0   8.32  )-----------( 383      ( 03 Jan 2022 07:05 )             39.6     01-03    144  |  113<H>  |  32<H>  ----------------------------<  116<H>  4.1   |  23  |  0.98    Ca    8.7      03 Jan 2022 07:05    TPro  6.8  /  Alb  2.8<L>  /  TBili  0.4  /  DBili  x   /  AST  20  /  ALT  27  /  AlkPhos  49  01-03        CAPILLARY BLOOD GLUCOSE      POCT Blood Glucose.: 178 mg/dL (03 Jan 2022 16:34)  POCT Blood Glucose.: 190 mg/dL (03 Jan 2022 12:58)      __________________________________________________  RADIOLOGY & ADDITIONAL TESTS:    Imaging Personally Reviewed:  YES    < from: Xray Chest 1 View AP/PA (12.29.21 @ 16:44) >  IMPRESSION:  Patchy lower infiltrates.    < end of copied text >    Consultant(s) Notes Reviewed:   YES     Plan of care was discussed with patient and /or primary care giver; all questions and concerns were addressed and care was aligned with patient's wishes.    Plan discussed with attending and consulting physicians.  
HPI:  76 YOF admitted with AHRF secondary to COVID.  Rem and Dex started.  SpO2 94% on room air.    OVERNIGHT EVENTS:  No new overnight events.  Seen and examined at bedside.     REVIEW OF SYSTEMS:      CONSTITUTIONAL: No fever  EYES: no acute visual disturbances  NECK: No pain or stiffness  RESPIRATORY: No cough; shortness of breath resolving  CARDIOVASCULAR: No chest pain, no palpitations  GASTROINTESTINAL: No pain. No nausea, vomiting or diarrhea   NEUROLOGICAL: No headache or numbness, no tremors  MUSCULOSKELETAL: No joint pain, no muscle pain  GENITOURINARY: no dysuria, no frequency, no hesitancy  PSYCHIATRY: no depression, no anxiety  ALL OTHER  ROS negative        Vital Signs Last 24 Hrs  T(C): 36.7 (04 Jan 2022 13:02), Max: 36.7 (04 Jan 2022 13:02)  T(F): 98.1 (04 Jan 2022 13:02), Max: 98.1 (04 Jan 2022 13:02)  HR: 86 (04 Jan 2022 13:02) (58 - 86)  BP: 101/75 (04 Jan 2022 13:02) (101/75 - 152/89)  BP(mean): --  RR: 18 (04 Jan 2022 13:02) (18 - 18)  SpO2: 97% (04 Jan 2022 13:02) (92% - 97%)    ________________________________________________  PHYSICAL EXAM:    GENERAL: NAD  HEENT: Normocephalic; conjunctivae and sclerae clear;  NECK : supple, no JVD  CHEST/LUNG: Clear to auscultation; Nonlabored  HEART: S1 S2  regular  ABDOMEN: Soft, Nontender, Nondistended; Bowel sounds present  EXTREMITIES: no cyanosis; no LE edema; no calf tenderness  SKIN: warm and dry; No rashes or lesions  NERVOUS SYSTEM:  Alert; no new deficits    _________________________________________________  CURRENT MEDICATIONS:    MEDICATIONS  (STANDING):  dexAMETHasone  Injectable 6 milliGRAM(s) IV Push daily  dextrose 40% Gel 15 Gram(s) Oral once  dextrose 5%. 1000 milliLiter(s) (50 mL/Hr) IV Continuous <Continuous>  dextrose 5%. 1000 milliLiter(s) (100 mL/Hr) IV Continuous <Continuous>  dextrose 50% Injectable 25 Gram(s) IV Push once  dextrose 50% Injectable 12.5 Gram(s) IV Push once  dextrose 50% Injectable 25 Gram(s) IV Push once  enoxaparin Injectable 40 milliGRAM(s) SubCutaneous every 12 hours  glucagon  Injectable 1 milliGRAM(s) IntraMuscular once  insulin lispro (ADMELOG) corrective regimen sliding scale   SubCutaneous three times a day before meals  pantoprazole    Tablet 40 milliGRAM(s) Oral before breakfast    MEDICATIONS  (PRN):  acetaminophen     Tablet .. 650 milliGRAM(s) Oral every 6 hours PRN Temp greater or equal to 38C (100.4F)  guaiFENesin Oral Liquid (Sugar-Free) 100 milliGRAM(s) Oral every 6 hours PRN Cough  ondansetron Injectable 4 milliGRAM(s) IV Push every 6 hours PRN Nausea and/or Vomiting      __________________________________________________  LABS:                          13.7   10.02 )-----------( 456      ( 04 Jan 2022 06:17 )             41.6     01-04    144  |  113<H>  |  30<H>  ----------------------------<  100<H>  3.7   |  26  |  0.94    Ca    9.0      04 Jan 2022 06:17  Phos  2.6     01-04  Mg     2.4     01-04    TPro  6.8  /  Alb  2.8<L>  /  TBili  0.4  /  DBili  x   /  AST  20  /  ALT  27  /  AlkPhos  49  01-03        CAPILLARY BLOOD GLUCOSE      POCT Blood Glucose.: 157 mg/dL (04 Jan 2022 11:11)  POCT Blood Glucose.: 116 mg/dL (04 Jan 2022 07:48)  POCT Blood Glucose.: 152 mg/dL (03 Jan 2022 21:23)  POCT Blood Glucose.: 178 mg/dL (03 Jan 2022 16:34)      __________________________________________________  RADIOLOGY & ADDITIONAL TESTS:    Imaging Personally Reviewed:  YES    < from: Xray Chest 1 View AP/PA (12.29.21 @ 16:44) >  IMPRESSION:  Patchy lower infiltrates.    < end of copied text >    Consultant(s) Notes Reviewed:   YES     Plan of care was discussed with patient and /or primary care giver; all questions and concerns were addressed and care was aligned with patient's wishes.    Plan discussed with attending and consulting physicians.

## 2022-01-05 NOTE — PROGRESS NOTE ADULT - PROBLEM SELECTOR PROBLEM 2
[place] (acute kidney injury)

## 2022-01-05 NOTE — PROGRESS NOTE ADULT - PROBLEM SELECTOR PLAN 1
Acute hypoxic respiratory failure 2/2 COVID  COVID+ 12/25  Currently sat 97% on 2L NC  s/p ceftriaxone, azithromycin in ED  c/w remdesivir  c/w decadron  Lovenox 40 mg SQ bid for dvt ppx given COVID algorithm  PRN tylenol for fevers  PRN robitussin for cough  PRN zofran for nausea (QTc 424 ms).
Acute hypoxic respiratory failure 2/2 COVID  COVID+ 12/25  saturating 88% on room air, improved to 95% on 4L NC  s/p ceftriaxone, azithromycin in ED  c/w remdesivir  c/w decadron  Lovenox 40 mg SQ bid for dvt ppx given COVID algorithm  PRN tylenol for fevers  PRN robitussin for cough  PRN zofran for nausea (QTc 424 ms).
SpO2 94% on RA  DDimer 506  Start full dose Lovenox   S/p course Remdesivir  Continue Decadron (day 7/10)  Continue with isolation precaution  Continue with PRN O2 support  Continue with supportive care  Follow up lab results
Acute hypoxic respiratory failure 2/2 COVID  COVID+ 12/25  Currently sat 99% on 2L NC  s/p ceftriaxone, azithromycin in ED  c/w remdesivir  c/w decadron  Lovenox 40 mg SQ bid for dvt ppx given COVID algorithm  PRN tylenol for fevers  PRN robitussin for cough  PRN zofran for nausea (QTc 424 ms).
SpO2 100% on RA  DDimer 506  Start full dose Lovenox   S/p course Remdesivir  Continue Decadron (day 6/10)  Continue with isolation precaution  Continue with PRN O2 support  Continue with supportive care  Follow up lab results
SpO2 97% on RA  DDimer 506  Start full dose Lovenox   S/p course Remdesivir  Continue Decadron (day 8/10)  Continue with isolation precaution  Continue with PRN O2 support  Continue with supportive care  Follow up lab results
Acute hypoxic respiratory failure 2/2 COVID  COVID+ 12/25  saturating 88% on room air, improved to 95% on 4L NC  s/p ceftriaxone, azithromycin in ED  c/w remdesivir  c/w decadron  Lovenox 40 mg SQ bid for dvt ppx given COVID algorithm  PRN tylenol for fevers  PRN robitussin for cough  PRN zofran for nausea (QTc 424 ms).

## 2022-01-05 NOTE — PROGRESS NOTE ADULT - PROBLEM SELECTOR PLAN 4
Pt medically stable for discharge  PT consulted for safe discharge planning  Covid + 12/29, follow up repeat PCR today  Care management following for discharge planning
Pt medically stable for discharge  PT recommends ARIANA  COVID + 1/3,  follow up repeat PCR 1/6  Care management following for discharge planning
Pt medically stable for discharge  PT recommends ARIANA  COVID + 1/3,  follow up repeat PCR 1/6  Care management following for discharge planning

## 2022-01-05 NOTE — PROGRESS NOTE ADULT - ATTENDING COMMENTS
Patient was seen and examined at bedside     Vitals stable - 148/86, P 65. T 97.7F  O2 sat 99% on 2L NC    P/E:  NAD   AAOx3, no focal deficit   BL crepitations  S1S2 WNL, no MRG   Abd soft, non tender, BS present   BL LE no edema or calf tenderness     labs noted     A/P:  Acute hypoxic respiratory failure due to COVID 19    - Oxygen supplementation as needed; keep saturation >92%, titrate down as tolerated   - Cont Remdesevir, monitor if liver and renal function is stable   - Cont Dexamethasone since requiring oxygen  -  monitor LFTs and BMP daily  -  monitor inflammatory markers: ESR, CRP, Procalcitonin, LDH, Ferritin and D-dimer q2-3days  -  Lovenox for DVT ppx as per BMI and CrCl, please follow institutional guideline  -  ISS while on Dexamethasone   -  Encourage prone positioning 10-15 mins /hr as tolerated  - Isolation precautions for COVID-19 per infection control protocol  - electrolyte and fluid repletion as needed  - OOB to chair   - Incentive spirometry  -Titrate off O2; currently on 2L NC. Possible D/C tomorrow if pt clinically stable.
Patient was seen and examined at bedside     Vitals stable - 126/71, P 71. T 98.4F. O2 sat 97% on 2L NC    P/E:  NAD   AAOx3, no focal deficit   BL crepitations  S1S2 WNL, no MRG   Abd soft, non tender, BS present   BL LE no edema or calf tenderness     labs noted     A/P:  Acute hypoxic respiratory failure due to COVID 19    - Oxygen supplementation as needed; keep saturation >92%, titrate down as tolerated   - Cont Remdesevir, monitor if liver and renal function is stable   - Cont Dexamethasone since requiring oxygen  -  monitor LFTs and BMP daily  -  monitor inflammatory markers: ESR, CRP, Procalcitonin, LDH, Ferritin and D-dimer q2-3days  -  Lovenox for DVT ppx as per BMI and CrCl, please follow institutional guideline  -  ISS while on Dexamethasone   -  Encourage prone positioning 10-15 mins /hr as tolerated  - Isolation precautions for COVID-19 per infection control protocol  - electrolyte and fluid repletion as needed  - OOB to chair   - Incentive spirometry
Patient seen/evaluated at bedside 12/30/21. I agree with the ACP progress note/outlined plan of care. My independent findings and conclusions are documented.    Reports generalized weakness, fatigue    vitals reviewed  PE  AAOx3 female speaking in complete sentences  significant for few bilateral crackles     labs reviewed    1. acute hypoxic respiratory failure s/t  2. COVID-19 PNA  3. Zeny  4. dehydration    02 supplementation, remdesivir/decadron  ZENY resolved s/p IVF hydration  encourage oral intake  updates provided to family members: daughters Esther/Malaika/ spouse
Patient was seen and examined at bedside   Complains of cough with deep breath     Vitals stable     P/E:  NAD   AAOx3, no focal deficit   BL crepitations  S1S2 WNL, no MRG   Abd soft, non tender, BS present   BL LE no edema or calf tenderness     labs noted     A/P:  Acute hypoxic respiratory failure due to COVID 19    - Oxygen supplementation as needed; keep saturation >92%, titrate down as tolerated   - Cont Remdesevir, monitor if liver and renal function is stable   - Cont Dexamethasone since requiring oxygen  -  monitor LFTs and BMP daily  -  monitor inflammatory markers: ESR, CRP, Procalcitonin, LDH, Ferritin and D-dimer q2-3days  -  Lovenox for DVT ppx as per BMI and CrCl, please follow institutional guideline  -  ISS while on Dexamethasone   -  Encourage prone positioning 10-15 mins /hr as tolerated  - Isolation precautions for COVID-19 per infection control protocol  - electrolyte and fluid repletion as needed  - OOB to chair   - Incentive spirometry
a/p# Acute Hypoxic Resp failure 2/2 COVID- 19 # covid- 19 pneumonia    -weaned off oxygen. snf .   dc planning
a/p# Acute Hypoxic Resp failure 2/2 COVID-19 Pneumonia # [place]    [place] resolved, oxygen weaned off, need rehab as weak , dc planning
encouraged po intake, cw decadron till in hospital- dc planning to SNF

## 2022-01-06 ENCOUNTER — TRANSCRIPTION ENCOUNTER (OUTPATIENT)
Age: 77
End: 2022-01-06

## 2022-01-06 VITALS
OXYGEN SATURATION: 97 % | HEART RATE: 82 BPM | TEMPERATURE: 98 F | SYSTOLIC BLOOD PRESSURE: 104 MMHG | DIASTOLIC BLOOD PRESSURE: 69 MMHG | RESPIRATION RATE: 18 BRPM

## 2022-01-06 LAB
GLUCOSE BLDC GLUCOMTR-MCNC: 129 MG/DL — HIGH (ref 70–99)
GLUCOSE BLDC GLUCOMTR-MCNC: 204 MG/DL — HIGH (ref 70–99)
SARS-COV-2 RNA SPEC QL NAA+PROBE: DETECTED

## 2022-01-06 PROCEDURE — 96365 THER/PROPH/DIAG IV INF INIT: CPT

## 2022-01-06 PROCEDURE — 85652 RBC SED RATE AUTOMATED: CPT

## 2022-01-06 PROCEDURE — 87040 BLOOD CULTURE FOR BACTERIA: CPT

## 2022-01-06 PROCEDURE — 85730 THROMBOPLASTIN TIME PARTIAL: CPT

## 2022-01-06 PROCEDURE — 83615 LACTATE (LD) (LDH) ENZYME: CPT

## 2022-01-06 PROCEDURE — 84100 ASSAY OF PHOSPHORUS: CPT

## 2022-01-06 PROCEDURE — 81001 URINALYSIS AUTO W/SCOPE: CPT

## 2022-01-06 PROCEDURE — 87086 URINE CULTURE/COLONY COUNT: CPT

## 2022-01-06 PROCEDURE — 85379 FIBRIN DEGRADATION QUANT: CPT

## 2022-01-06 PROCEDURE — 82570 ASSAY OF URINE CREATININE: CPT

## 2022-01-06 PROCEDURE — 80053 COMPREHEN METABOLIC PANEL: CPT

## 2022-01-06 PROCEDURE — 82728 ASSAY OF FERRITIN: CPT

## 2022-01-06 PROCEDURE — 82436 ASSAY OF URINE CHLORIDE: CPT

## 2022-01-06 PROCEDURE — 36415 COLL VENOUS BLD VENIPUNCTURE: CPT

## 2022-01-06 PROCEDURE — 82962 GLUCOSE BLOOD TEST: CPT

## 2022-01-06 PROCEDURE — 84300 ASSAY OF URINE SODIUM: CPT

## 2022-01-06 PROCEDURE — 71045 X-RAY EXAM CHEST 1 VIEW: CPT

## 2022-01-06 PROCEDURE — 85025 COMPLETE CBC W/AUTO DIFF WBC: CPT

## 2022-01-06 PROCEDURE — 99285 EMERGENCY DEPT VISIT HI MDM: CPT

## 2022-01-06 PROCEDURE — 83605 ASSAY OF LACTIC ACID: CPT

## 2022-01-06 PROCEDURE — 80048 BASIC METABOLIC PNL TOTAL CA: CPT

## 2022-01-06 PROCEDURE — 96375 TX/PRO/DX INJ NEW DRUG ADDON: CPT

## 2022-01-06 PROCEDURE — 83735 ASSAY OF MAGNESIUM: CPT

## 2022-01-06 PROCEDURE — 85027 COMPLETE CBC AUTOMATED: CPT

## 2022-01-06 PROCEDURE — 86140 C-REACTIVE PROTEIN: CPT

## 2022-01-06 PROCEDURE — 83935 ASSAY OF URINE OSMOLALITY: CPT

## 2022-01-06 PROCEDURE — 80061 LIPID PANEL: CPT

## 2022-01-06 PROCEDURE — 87635 SARS-COV-2 COVID-19 AMP PRB: CPT

## 2022-01-06 PROCEDURE — 84443 ASSAY THYROID STIM HORMONE: CPT

## 2022-01-06 PROCEDURE — 99239 HOSP IP/OBS DSCHRG MGMT >30: CPT

## 2022-01-06 PROCEDURE — 93005 ELECTROCARDIOGRAM TRACING: CPT

## 2022-01-06 PROCEDURE — 85610 PROTHROMBIN TIME: CPT

## 2022-01-06 PROCEDURE — 97162 PT EVAL MOD COMPLEX 30 MIN: CPT

## 2022-01-06 PROCEDURE — 86803 HEPATITIS C AB TEST: CPT

## 2022-01-06 PROCEDURE — 83036 HEMOGLOBIN GLYCOSYLATED A1C: CPT

## 2022-01-06 PROCEDURE — 84145 PROCALCITONIN (PCT): CPT

## 2022-01-06 RX ORDER — PANTOPRAZOLE SODIUM 20 MG/1
1 TABLET, DELAYED RELEASE ORAL
Qty: 0 | Refills: 0 | DISCHARGE
Start: 2022-01-06

## 2022-01-06 RX ORDER — RIVAROXABAN 15 MG-20MG
1 KIT ORAL
Qty: 30 | Refills: 0
Start: 2022-01-06 | End: 2022-02-04

## 2022-01-06 RX ORDER — ACETAMINOPHEN 500 MG
2 TABLET ORAL
Qty: 0 | Refills: 0 | DISCHARGE
Start: 2022-01-06

## 2022-01-06 RX ADMIN — PANTOPRAZOLE SODIUM 40 MILLIGRAM(S): 20 TABLET, DELAYED RELEASE ORAL at 05:17

## 2022-01-06 RX ADMIN — Medication 6 MILLIGRAM(S): at 05:17

## 2022-01-06 RX ADMIN — ENOXAPARIN SODIUM 40 MILLIGRAM(S): 100 INJECTION SUBCUTANEOUS at 05:18

## 2022-01-06 RX ADMIN — Medication 2: at 12:05

## 2022-01-06 NOTE — DISCHARGE NOTE NURSING/CASE MANAGEMENT/SOCIAL WORK - PATIENT PORTAL LINK FT
You can access the FollowMyHealth Patient Portal offered by [facility] by registering at the following website: [email or web address]. By joining [employer]’s FollowMyHealth portal, you will also be able to view your health information using other applications (apps) compatible with our system.

## 2022-01-06 NOTE — DISCHARGE NOTE NURSING/CASE MANAGEMENT/SOCIAL WORK - NSDCVIVACCINE_GEN_ALL_CORE_FT
Tdap; 25-Jun-2016 15:39; Mukesh Chaidez (RN); [employer]; [id number]; IntraMuscular; Deltoid Right.; 0.5 milliLiter(s); VIS (VIS Published: 09-May-2013, VIS Presented: 25-Jun-2016);

## 2022-01-06 NOTE — DISCHARGE NOTE NURSING/CASE MANAGEMENT/SOCIAL WORK - NSDCPEFALRISK_GEN_ALL_CORE
For information on Fall & Injury Prevention, visit: [email or web address] OR  [email or web address] OR  https://www.cdc.gov/steadi/patient.html

## 2022-03-10 ENCOUNTER — INPATIENT (INPATIENT)
Facility: HOSPITAL | Age: 77
LOS: 3 days | Discharge: HOME | DRG: 392 | End: 2022-03-14
Attending: SURGERY | Admitting: SURGERY
Payer: MEDICARE

## 2022-03-10 VITALS
OXYGEN SATURATION: 98 % | HEART RATE: 101 BPM | RESPIRATION RATE: 18 BRPM | SYSTOLIC BLOOD PRESSURE: 130 MMHG | DIASTOLIC BLOOD PRESSURE: 80 MMHG | WEIGHT: 179.9 LBS | HEIGHT: 63 IN | TEMPERATURE: 98 F

## 2022-03-10 DIAGNOSIS — R10.9 UNSPECIFIED ABDOMINAL PAIN: ICD-10-CM

## 2022-03-10 LAB
ALBUMIN SERPL ELPH-MCNC: 3.7 G/DL — SIGNIFICANT CHANGE UP (ref 3.5–5)
ALP SERPL-CCNC: 68 U/L — SIGNIFICANT CHANGE UP (ref 40–120)
ALT FLD-CCNC: 16 U/L DA — SIGNIFICANT CHANGE UP (ref 10–60)
ANION GAP SERPL CALC-SCNC: 4 MMOL/L — LOW (ref 5–17)
APPEARANCE UR: CLEAR — SIGNIFICANT CHANGE UP
APTT BLD: 55.9 SEC — HIGH (ref 27.5–35.5)
AST SERPL-CCNC: 7 U/L — LOW (ref 10–40)
BASOPHILS # BLD AUTO: 0.04 K/UL — SIGNIFICANT CHANGE UP (ref 0–0.2)
BASOPHILS NFR BLD AUTO: 0.4 % — SIGNIFICANT CHANGE UP (ref 0–2)
BILIRUB SERPL-MCNC: 0.7 MG/DL — SIGNIFICANT CHANGE UP (ref 0.2–1.2)
BILIRUB UR-MCNC: NEGATIVE — SIGNIFICANT CHANGE UP
BUN SERPL-MCNC: 10 MG/DL — SIGNIFICANT CHANGE UP (ref 7–18)
CALCIUM SERPL-MCNC: 9.4 MG/DL — SIGNIFICANT CHANGE UP (ref 8.4–10.5)
CHLORIDE SERPL-SCNC: 106 MMOL/L — SIGNIFICANT CHANGE UP (ref 96–108)
CO2 SERPL-SCNC: 29 MMOL/L — SIGNIFICANT CHANGE UP (ref 22–31)
COLOR SPEC: YELLOW — SIGNIFICANT CHANGE UP
CREAT SERPL-MCNC: 0.87 MG/DL — SIGNIFICANT CHANGE UP (ref 0.5–1.3)
DIFF PNL FLD: NEGATIVE — SIGNIFICANT CHANGE UP
EGFR: 69 ML/MIN/1.73M2 — SIGNIFICANT CHANGE UP
EOSINOPHIL # BLD AUTO: 0.17 K/UL — SIGNIFICANT CHANGE UP (ref 0–0.5)
EOSINOPHIL NFR BLD AUTO: 1.8 % — SIGNIFICANT CHANGE UP (ref 0–6)
GLUCOSE SERPL-MCNC: 109 MG/DL — HIGH (ref 70–99)
GLUCOSE UR QL: NEGATIVE — SIGNIFICANT CHANGE UP
HCT VFR BLD CALC: 36 % — SIGNIFICANT CHANGE UP (ref 34.5–45)
HGB BLD-MCNC: 11.8 G/DL — SIGNIFICANT CHANGE UP (ref 11.5–15.5)
IMM GRANULOCYTES NFR BLD AUTO: 0.5 % — SIGNIFICANT CHANGE UP (ref 0–1.5)
INR BLD: 1.08 RATIO — SIGNIFICANT CHANGE UP (ref 0.88–1.16)
KETONES UR-MCNC: NEGATIVE — SIGNIFICANT CHANGE UP
LACTATE SERPL-SCNC: 0.5 MMOL/L — LOW (ref 0.7–2)
LEUKOCYTE ESTERASE UR-ACNC: NEGATIVE — SIGNIFICANT CHANGE UP
LIDOCAIN IGE QN: 70 U/L — LOW (ref 73–393)
LYMPHOCYTES # BLD AUTO: 0.75 K/UL — LOW (ref 1–3.3)
LYMPHOCYTES # BLD AUTO: 8 % — LOW (ref 13–44)
MCHC RBC-ENTMCNC: 30.1 PG — SIGNIFICANT CHANGE UP (ref 27–34)
MCHC RBC-ENTMCNC: 32.8 GM/DL — SIGNIFICANT CHANGE UP (ref 32–36)
MCV RBC AUTO: 91.8 FL — SIGNIFICANT CHANGE UP (ref 80–100)
MONOCYTES # BLD AUTO: 0.73 K/UL — SIGNIFICANT CHANGE UP (ref 0–0.9)
MONOCYTES NFR BLD AUTO: 7.8 % — SIGNIFICANT CHANGE UP (ref 2–14)
NEUTROPHILS # BLD AUTO: 7.67 K/UL — HIGH (ref 1.8–7.4)
NEUTROPHILS NFR BLD AUTO: 81.5 % — HIGH (ref 43–77)
NITRITE UR-MCNC: NEGATIVE — SIGNIFICANT CHANGE UP
NRBC # BLD: 0 /100 WBCS — SIGNIFICANT CHANGE UP (ref 0–0)
PH UR: 6 — SIGNIFICANT CHANGE UP (ref 5–8)
PLATELET # BLD AUTO: 392 K/UL — SIGNIFICANT CHANGE UP (ref 150–400)
POTASSIUM SERPL-MCNC: 4.3 MMOL/L — SIGNIFICANT CHANGE UP (ref 3.5–5.3)
POTASSIUM SERPL-SCNC: 4.3 MMOL/L — SIGNIFICANT CHANGE UP (ref 3.5–5.3)
PROT SERPL-MCNC: 8 G/DL — SIGNIFICANT CHANGE UP (ref 6–8.3)
PROT UR-MCNC: NEGATIVE — SIGNIFICANT CHANGE UP
PROTHROM AB SERPL-ACNC: 12.9 SEC — SIGNIFICANT CHANGE UP (ref 10.5–13.4)
RBC # BLD: 3.92 M/UL — SIGNIFICANT CHANGE UP (ref 3.8–5.2)
RBC # FLD: 14.4 % — SIGNIFICANT CHANGE UP (ref 10.3–14.5)
SARS-COV-2 RNA SPEC QL NAA+PROBE: SIGNIFICANT CHANGE UP
SODIUM SERPL-SCNC: 139 MMOL/L — SIGNIFICANT CHANGE UP (ref 135–145)
SP GR SPEC: 1.01 — SIGNIFICANT CHANGE UP (ref 1.01–1.02)
UROBILINOGEN FLD QL: NEGATIVE — SIGNIFICANT CHANGE UP
WBC # BLD: 9.41 K/UL — SIGNIFICANT CHANGE UP (ref 3.8–10.5)
WBC # FLD AUTO: 9.41 K/UL — SIGNIFICANT CHANGE UP (ref 3.8–10.5)

## 2022-03-10 PROCEDURE — 93010 ELECTROCARDIOGRAM REPORT: CPT

## 2022-03-10 PROCEDURE — 74177 CT ABD & PELVIS W/CONTRAST: CPT | Mod: 26,MA

## 2022-03-10 PROCEDURE — 99285 EMERGENCY DEPT VISIT HI MDM: CPT

## 2022-03-10 RX ORDER — METRONIDAZOLE 500 MG
500 TABLET ORAL ONCE
Refills: 0 | Status: COMPLETED | OUTPATIENT
Start: 2022-03-10 | End: 2022-03-10

## 2022-03-10 RX ORDER — HYDROMORPHONE HYDROCHLORIDE 2 MG/ML
0.5 INJECTION INTRAMUSCULAR; INTRAVENOUS; SUBCUTANEOUS
Refills: 0 | Status: DISCONTINUED | OUTPATIENT
Start: 2022-03-10 | End: 2022-03-14

## 2022-03-10 RX ORDER — CIPROFLOXACIN LACTATE 400MG/40ML
VIAL (ML) INTRAVENOUS
Refills: 0 | Status: DISCONTINUED | OUTPATIENT
Start: 2022-03-10 | End: 2022-03-14

## 2022-03-10 RX ORDER — ENOXAPARIN SODIUM 100 MG/ML
40 INJECTION SUBCUTANEOUS EVERY 24 HOURS
Refills: 0 | Status: DISCONTINUED | OUTPATIENT
Start: 2022-03-10 | End: 2022-03-14

## 2022-03-10 RX ORDER — METRONIDAZOLE 500 MG
500 TABLET ORAL EVERY 8 HOURS
Refills: 0 | Status: DISCONTINUED | OUTPATIENT
Start: 2022-03-11 | End: 2022-03-14

## 2022-03-10 RX ORDER — ONDANSETRON 8 MG/1
4 TABLET, FILM COATED ORAL EVERY 6 HOURS
Refills: 0 | Status: DISCONTINUED | OUTPATIENT
Start: 2022-03-10 | End: 2022-03-14

## 2022-03-10 RX ORDER — ACETAMINOPHEN 500 MG
650 TABLET ORAL EVERY 6 HOURS
Refills: 0 | Status: DISCONTINUED | OUTPATIENT
Start: 2022-03-10 | End: 2022-03-14

## 2022-03-10 RX ORDER — CIPROFLOXACIN LACTATE 400MG/40ML
400 VIAL (ML) INTRAVENOUS ONCE
Refills: 0 | Status: COMPLETED | OUTPATIENT
Start: 2022-03-10 | End: 2022-03-10

## 2022-03-10 RX ORDER — CIPROFLOXACIN LACTATE 400MG/40ML
400 VIAL (ML) INTRAVENOUS EVERY 12 HOURS
Refills: 0 | Status: DISCONTINUED | OUTPATIENT
Start: 2022-03-11 | End: 2022-03-14

## 2022-03-10 RX ORDER — SODIUM CHLORIDE 9 MG/ML
1000 INJECTION INTRAMUSCULAR; INTRAVENOUS; SUBCUTANEOUS
Refills: 0 | Status: DISCONTINUED | OUTPATIENT
Start: 2022-03-10 | End: 2022-03-14

## 2022-03-10 RX ORDER — METRONIDAZOLE 500 MG
TABLET ORAL
Refills: 0 | Status: DISCONTINUED | OUTPATIENT
Start: 2022-03-10 | End: 2022-03-14

## 2022-03-10 RX ORDER — MORPHINE SULFATE 50 MG/1
2 CAPSULE, EXTENDED RELEASE ORAL ONCE
Refills: 0 | Status: DISCONTINUED | OUTPATIENT
Start: 2022-03-10 | End: 2022-03-10

## 2022-03-10 RX ADMIN — MORPHINE SULFATE 2 MILLIGRAM(S): 50 CAPSULE, EXTENDED RELEASE ORAL at 12:36

## 2022-03-10 RX ADMIN — HYDROMORPHONE HYDROCHLORIDE 0.5 MILLIGRAM(S): 2 INJECTION INTRAMUSCULAR; INTRAVENOUS; SUBCUTANEOUS at 22:26

## 2022-03-10 RX ADMIN — Medication 100 MILLIGRAM(S): at 20:56

## 2022-03-10 RX ADMIN — HYDROMORPHONE HYDROCHLORIDE 0.5 MILLIGRAM(S): 2 INJECTION INTRAMUSCULAR; INTRAVENOUS; SUBCUTANEOUS at 22:50

## 2022-03-10 RX ADMIN — ENOXAPARIN SODIUM 40 MILLIGRAM(S): 100 INJECTION SUBCUTANEOUS at 22:27

## 2022-03-10 RX ADMIN — Medication 200 MILLIGRAM(S): at 19:22

## 2022-03-10 RX ADMIN — SODIUM CHLORIDE 100 MILLILITER(S): 9 INJECTION INTRAMUSCULAR; INTRAVENOUS; SUBCUTANEOUS at 19:24

## 2022-03-10 NOTE — ED PROVIDER NOTE - OBJECTIVE STATEMENT
75 yo F denies pmh and psh presents with lower abdo pain x a few days, worsening, with watery diarrhea. Had colonoscopy a few years ago wnl. Denies other acute complaints.

## 2022-03-10 NOTE — H&P ADULT - NSICDXFAMILYHX_GEN_ALL_CORE_FT
FAMILY HISTORY:  Sibling  Still living? Unknown  FH: colon cancer, Age at diagnosis: Age Unknown

## 2022-03-10 NOTE — H&P ADULT - NSHPPHYSICALEXAM_GEN_ALL_CORE
Vital Signs Last 24 Hrs  T(C): 36.7 (10 Mar 2022 15:56), Max: 36.7 (10 Mar 2022 15:56)  T(F): 98.1 (10 Mar 2022 15:56), Max: 98.1 (10 Mar 2022 15:56)  HR: 79 (10 Mar 2022 15:56) (79 - 101)  BP: 121/70 (10 Mar 2022 15:56) (121/70 - 130/80)  RR: 18 (10 Mar 2022 15:56) (18 - 18)  SpO2: 98% (10 Mar 2022 15:56) (98% - 98%)    Physical Exam:    General:  Appears stated age, well-groomed, well-nourished, no distress  Eyes: EOMI  HENT:  WNL, no JVD  Chest: respirations nonlabored  Cardiovascular:  Regular rate & rhythm  Abdomen: soft, mild LLQ tenderness to palpation, nondistended   Extremities: no edema bilaterally  Skin: warm and dry  Musculoskeletal: no calf tenderness  Neuro:  Alert, oriented to time, place and person   Psych: normal affect

## 2022-03-10 NOTE — H&P ADULT - ASSESSMENT
76 year old female with acute sigmoid diverticulitis with 2.5cm intramural abscess.   Normal vital signs, no leukocytosis.     - admit to surgery under Dr. Gonzales  - IV antibiotics  - pain control PRN  - NPO with sips and IVF  - repeat labs in AM   - discussed with Dr. Gonzales     76 year old female with acute sigmoid diverticulitis with 2.5cm intramural abscess.   Normal vital signs, no leukocytosis.     - admit to surgery under Dr. Gonzales  - IV antibiotics  - pain control PRN  - NPO with sips and IVF  - repeat labs in AM   - discussed with Dr. Gonzales

## 2022-03-10 NOTE — PHARMACOTHERAPY INTERVENTION NOTE - COMMENTS
Patient's home pharmacy ([facility] on [address] [facility] [phone]) on record was contacted and the Outside Medication Record was updated based on the pharmacy prescription history.

## 2022-03-10 NOTE — H&P ADULT - HISTORY OF PRESENT ILLNESS
76 year old female with PMH of diverticulosis and no pertinent surgical history presents to the ED complaining of abdominal pain, fatigue and watery diarrhea. Patient states that on her drive home from [facility] she developed RLQ pain associated with loss of appetite. She had persistent fatigue over the past few days. She had about two days of watery diarrhea which resolved yesterday and today's bowel movement was normal color and consistency. +Flatus. Denies fever, chills, nausea, vomiting, bloody BM. Ambulates with cane. She states she gets screening colonoscopies which have been "clean." Her last one was approximately 2-3 years ago per her recollection. She admits to having a younger sister who passed away from colon cancer. Had recent hospitalization with COVID.

## 2022-03-10 NOTE — PATIENT PROFILE ADULT - FALL HARM RISK - HARM RISK INTERVENTIONS
Assistance with ambulation/Assistance OOB with selected safe patient handling equipment/Communicate Risk of Fall with Harm to all staff/Discuss with provider need for PT consult/Monitor gait and stability/Reinforce activity limits and safety measures with patient and family/Tailored Fall Risk Interventions/Visual Cue: Yellow wristband and red socks/Bed in lowest position, wheels locked, appropriate side rails in place/Call bell, personal items and telephone in reach/Instruct patient to call for assistance before getting out of bed or chair/Non-slip footwear when patient is out of bed/[place] to call system/Physically safe environment - no spills, clutter or unnecessary equipment/Purposeful Proactive Rounding/Room/bathroom lighting operational, light cord in reach

## 2022-03-10 NOTE — H&P ADULT - NSHPLABSRESULTS_GEN_ALL_CORE
LABS:                        11.8   9.41  )-----------( 392      ( 10 Mar 2022 12:17 )             36.0     03-10    139  |  106  |  10  ----------------------------<  109<H>  4.3   |  29  |  0.87    Ca    9.4      10 Mar 2022 12:17    TPro  8.0  /  Alb  3.7  /  TBili  0.7  /  DBili  x   /  AST  7<L>  /  ALT  16  /  AlkPhos  68  03-10    PT/INR - ( 10 Mar 2022 12:16 )   PT: 12.9 sec;   INR: 1.08 ratio      PTT - ( 10 Mar 2022 12:16 )  PTT:55.9 sec  Urinalysis Basic - ( 10 Mar 2022 14:33 )    Color: Yellow / Appearance: Clear / S.015 / pH: x  Gluc: x / Ketone: Negative  / Bili: Negative / Urobili: Negative   Blood: x / Protein: Negative / Nitrite: Negative   Leuk Esterase: Negative / RBC: x / WBC x   Sq Epi: x / Non Sq Epi: x / Bacteria: x    RADIOLOGY & ADDITIONAL STUDIES:  < from: CT Abdomen and Pelvis w/ IV Cont (03.10.22 @ 15:56) >    IMPRESSION:  Acute sigmoid diverticulitis with associated 2.5 cm intramural abscess.   No peritoneal free air or pericolonic drainable collection at this time.    --- End of Report ---    < end of copied text >

## 2022-03-11 LAB
HCT VFR BLD CALC: 36.4 % — SIGNIFICANT CHANGE UP (ref 34.5–45)
HGB BLD-MCNC: 12 G/DL — SIGNIFICANT CHANGE UP (ref 11.5–15.5)
MCHC RBC-ENTMCNC: 30.2 PG — SIGNIFICANT CHANGE UP (ref 27–34)
MCHC RBC-ENTMCNC: 33 GM/DL — SIGNIFICANT CHANGE UP (ref 32–36)
MCV RBC AUTO: 91.7 FL — SIGNIFICANT CHANGE UP (ref 80–100)
NRBC # BLD: 0 /100 WBCS — SIGNIFICANT CHANGE UP (ref 0–0)
PLATELET # BLD AUTO: 390 K/UL — SIGNIFICANT CHANGE UP (ref 150–400)
RBC # BLD: 3.97 M/UL — SIGNIFICANT CHANGE UP (ref 3.8–5.2)
RBC # FLD: 14.2 % — SIGNIFICANT CHANGE UP (ref 10.3–14.5)
WBC # BLD: 8.89 K/UL — SIGNIFICANT CHANGE UP (ref 3.8–10.5)
WBC # FLD AUTO: 8.89 K/UL — SIGNIFICANT CHANGE UP (ref 3.8–10.5)

## 2022-03-11 RX ADMIN — ENOXAPARIN SODIUM 40 MILLIGRAM(S): 100 INJECTION SUBCUTANEOUS at 21:18

## 2022-03-11 RX ADMIN — SODIUM CHLORIDE 100 MILLILITER(S): 9 INJECTION INTRAMUSCULAR; INTRAVENOUS; SUBCUTANEOUS at 17:30

## 2022-03-11 RX ADMIN — Medication 200 MILLIGRAM(S): at 18:27

## 2022-03-11 RX ADMIN — MORPHINE SULFATE 2 MILLIGRAM(S): 50 CAPSULE, EXTENDED RELEASE ORAL at 09:56

## 2022-03-11 RX ADMIN — HYDROMORPHONE HYDROCHLORIDE 0.5 MILLIGRAM(S): 2 INJECTION INTRAMUSCULAR; INTRAVENOUS; SUBCUTANEOUS at 18:50

## 2022-03-11 RX ADMIN — Medication 100 MILLIGRAM(S): at 17:26

## 2022-03-11 RX ADMIN — Medication 200 MILLIGRAM(S): at 05:08

## 2022-03-11 RX ADMIN — HYDROMORPHONE HYDROCHLORIDE 0.5 MILLIGRAM(S): 2 INJECTION INTRAMUSCULAR; INTRAVENOUS; SUBCUTANEOUS at 14:15

## 2022-03-11 RX ADMIN — SODIUM CHLORIDE 100 MILLILITER(S): 9 INJECTION INTRAMUSCULAR; INTRAVENOUS; SUBCUTANEOUS at 05:08

## 2022-03-11 RX ADMIN — Medication 100 MILLIGRAM(S): at 05:09

## 2022-03-11 RX ADMIN — Medication 100 MILLIGRAM(S): at 21:18

## 2022-03-12 LAB
ANION GAP SERPL CALC-SCNC: 4 MMOL/L — LOW (ref 5–17)
BUN SERPL-MCNC: 9 MG/DL — SIGNIFICANT CHANGE UP (ref 7–18)
CALCIUM SERPL-MCNC: 8.4 MG/DL — SIGNIFICANT CHANGE UP (ref 8.4–10.5)
CHLORIDE SERPL-SCNC: 110 MMOL/L — HIGH (ref 96–108)
CO2 SERPL-SCNC: 27 MMOL/L — SIGNIFICANT CHANGE UP (ref 22–31)
CREAT SERPL-MCNC: 0.85 MG/DL — SIGNIFICANT CHANGE UP (ref 0.5–1.3)
CULTURE RESULTS: SIGNIFICANT CHANGE UP
EGFR: 71 ML/MIN/1.73M2 — SIGNIFICANT CHANGE UP
GLUCOSE SERPL-MCNC: 90 MG/DL — SIGNIFICANT CHANGE UP (ref 70–99)
HCT VFR BLD CALC: 31.6 % — LOW (ref 34.5–45)
HGB BLD-MCNC: 10.5 G/DL — LOW (ref 11.5–15.5)
MAGNESIUM SERPL-MCNC: 2 MG/DL — SIGNIFICANT CHANGE UP (ref 1.6–2.6)
MCHC RBC-ENTMCNC: 30.7 PG — SIGNIFICANT CHANGE UP (ref 27–34)
MCHC RBC-ENTMCNC: 33.2 GM/DL — SIGNIFICANT CHANGE UP (ref 32–36)
MCV RBC AUTO: 92.4 FL — SIGNIFICANT CHANGE UP (ref 80–100)
NRBC # BLD: 0 /100 WBCS — SIGNIFICANT CHANGE UP (ref 0–0)
PHOSPHATE SERPL-MCNC: 3.5 MG/DL — SIGNIFICANT CHANGE UP (ref 2.5–4.5)
PLATELET # BLD AUTO: 346 K/UL — SIGNIFICANT CHANGE UP (ref 150–400)
POTASSIUM SERPL-MCNC: 3.5 MMOL/L — SIGNIFICANT CHANGE UP (ref 3.5–5.3)
POTASSIUM SERPL-SCNC: 3.5 MMOL/L — SIGNIFICANT CHANGE UP (ref 3.5–5.3)
RBC # BLD: 3.42 M/UL — LOW (ref 3.8–5.2)
RBC # FLD: 14.3 % — SIGNIFICANT CHANGE UP (ref 10.3–14.5)
SODIUM SERPL-SCNC: 141 MMOL/L — SIGNIFICANT CHANGE UP (ref 135–145)
SPECIMEN SOURCE: SIGNIFICANT CHANGE UP
WBC # BLD: 6.25 K/UL — SIGNIFICANT CHANGE UP (ref 3.8–10.5)
WBC # FLD AUTO: 6.25 K/UL — SIGNIFICANT CHANGE UP (ref 3.8–10.5)

## 2022-03-12 PROCEDURE — 99231 SBSQ HOSP IP/OBS SF/LOW 25: CPT

## 2022-03-12 RX ADMIN — Medication 100 MILLIGRAM(S): at 21:18

## 2022-03-12 RX ADMIN — Medication 200 MILLIGRAM(S): at 05:08

## 2022-03-12 RX ADMIN — Medication 100 MILLIGRAM(S): at 05:08

## 2022-03-12 RX ADMIN — ENOXAPARIN SODIUM 40 MILLIGRAM(S): 100 INJECTION SUBCUTANEOUS at 21:18

## 2022-03-12 RX ADMIN — Medication 100 MILLIGRAM(S): at 13:55

## 2022-03-12 RX ADMIN — Medication 200 MILLIGRAM(S): at 17:21

## 2022-03-13 RX ADMIN — ENOXAPARIN SODIUM 40 MILLIGRAM(S): 100 INJECTION SUBCUTANEOUS at 21:33

## 2022-03-13 RX ADMIN — Medication 100 MILLIGRAM(S): at 05:06

## 2022-03-13 RX ADMIN — Medication 200 MILLIGRAM(S): at 17:42

## 2022-03-13 RX ADMIN — Medication 100 MILLIGRAM(S): at 13:11

## 2022-03-13 RX ADMIN — Medication 100 MILLIGRAM(S): at 21:33

## 2022-03-13 RX ADMIN — Medication 200 MILLIGRAM(S): at 05:06

## 2022-03-14 ENCOUNTER — TRANSCRIPTION ENCOUNTER (OUTPATIENT)
Age: 77
End: 2022-03-14

## 2022-03-14 VITALS
DIASTOLIC BLOOD PRESSURE: 78 MMHG | RESPIRATION RATE: 18 BRPM | TEMPERATURE: 98 F | HEART RATE: 99 BPM | SYSTOLIC BLOOD PRESSURE: 126 MMHG | OXYGEN SATURATION: 98 %

## 2022-03-14 PROCEDURE — 85027 COMPLETE CBC AUTOMATED: CPT

## 2022-03-14 PROCEDURE — 80053 COMPREHEN METABOLIC PANEL: CPT

## 2022-03-14 PROCEDURE — 80048 BASIC METABOLIC PNL TOTAL CA: CPT

## 2022-03-14 PROCEDURE — 81003 URINALYSIS AUTO W/O SCOPE: CPT

## 2022-03-14 PROCEDURE — 85730 THROMBOPLASTIN TIME PARTIAL: CPT

## 2022-03-14 PROCEDURE — 83735 ASSAY OF MAGNESIUM: CPT

## 2022-03-14 PROCEDURE — 87086 URINE CULTURE/COLONY COUNT: CPT

## 2022-03-14 PROCEDURE — 87635 SARS-COV-2 COVID-19 AMP PRB: CPT

## 2022-03-14 PROCEDURE — 96374 THER/PROPH/DIAG INJ IV PUSH: CPT

## 2022-03-14 PROCEDURE — 85610 PROTHROMBIN TIME: CPT

## 2022-03-14 PROCEDURE — 86901 BLOOD TYPING SEROLOGIC RH(D): CPT

## 2022-03-14 PROCEDURE — 83690 ASSAY OF LIPASE: CPT

## 2022-03-14 PROCEDURE — 86850 RBC ANTIBODY SCREEN: CPT

## 2022-03-14 PROCEDURE — 85025 COMPLETE CBC W/AUTO DIFF WBC: CPT

## 2022-03-14 PROCEDURE — 84100 ASSAY OF PHOSPHORUS: CPT

## 2022-03-14 PROCEDURE — 86900 BLOOD TYPING SEROLOGIC ABO: CPT

## 2022-03-14 PROCEDURE — 93005 ELECTROCARDIOGRAM TRACING: CPT

## 2022-03-14 PROCEDURE — 99285 EMERGENCY DEPT VISIT HI MDM: CPT | Mod: 25

## 2022-03-14 PROCEDURE — 83605 ASSAY OF LACTIC ACID: CPT

## 2022-03-14 PROCEDURE — 36415 COLL VENOUS BLD VENIPUNCTURE: CPT

## 2022-03-14 PROCEDURE — 74177 CT ABD & PELVIS W/CONTRAST: CPT | Mod: MA

## 2022-03-14 RX ORDER — CIPROFLOXACIN LACTATE 400MG/40ML
1 VIAL (ML) INTRAVENOUS
Qty: 28 | Refills: 0
Start: 2022-03-14 | End: 2022-03-27

## 2022-03-14 RX ORDER — METRONIDAZOLE 500 MG
1 TABLET ORAL
Qty: 42 | Refills: 0
Start: 2022-03-14 | End: 2022-03-27

## 2022-03-14 RX ADMIN — Medication 100 MILLIGRAM(S): at 14:53

## 2022-03-14 RX ADMIN — Medication 200 MILLIGRAM(S): at 05:25

## 2022-03-14 RX ADMIN — Medication 100 MILLIGRAM(S): at 05:29

## 2022-03-14 NOTE — PROGRESS NOTE ADULT - ASSESSMENT
diverticulitis with microperforation, clinically improved on abx    will discus with Dr Gonzales repeat CT vs outpatient follow up  cont abx  diet as tolerated
diveticulitis    f/u am labs  ivf  bowel rest  observation
diverticulitis with intramural abcess  clinically improved  tolerating diet    oob  pt on abx  will discuss repeat imaging vs discharge plan with attending

## 2022-03-14 NOTE — PROGRESS NOTE ADULT - SUBJECTIVE AND OBJECTIVE BOX
Pt s- new complaints. tolerating regular diet  ICU Vital Signs Last 24 Hrs  T(C): 36.7 (14 Mar 2022 05:19), Max: 36.9 (13 Mar 2022 13:42)  T(F): 98.1 (14 Mar 2022 05:19), Max: 98.4 (13 Mar 2022 13:42)  HR: 66 (14 Mar 2022 05:19) (66 - 86)  BP: 142/70 (14 Mar 2022 05:19) (138/86 - 151/78)  BP(mean): --  ABP: --  ABP(mean): --  RR: 18 (14 Mar 2022 05:19) (18 - 18)  SpO2: 98% (14 Mar 2022 05:19) (95% - 98%)  Alert nad  Abd: soft mild LLQ tenderness with deep palpation, no guarding  no cce  
Pt s- complaints. feels better  vss a/f  Abd: soft nt nd  no cce  wbc 6.5  h&h 10/31      diverticulitis, clinically improving  tolerating clears      adv diet as tolerated  observation  
Pt s- new complaints. LLQ pain. no n/v  ICU Vital Signs Last 24 Hrs  T(C): 36.9 (11 Mar 2022 05:22), Max: 36.9 (10 Mar 2022 19:22)  T(F): 98.4 (11 Mar 2022 05:22), Max: 98.4 (10 Mar 2022 19:22)  HR: 72 (11 Mar 2022 05:22) (72 - 101)  BP: 130/53 (11 Mar 2022 05:22) (114/80 - 151/79)  BP(mean): --  ABP: --  ABP(mean): --  RR: 18 (11 Mar 2022 05:22) (18 - 18)  SpO2: 100% (11 Mar 2022 05:22) (96% - 100%)  Alert nad  Abd: soft, mild lower abd tenderness, no guarding                        11.8   9.41  )-----------( 392      ( 10 Mar 2022 12:17 )             36.0   
Pt s- complaints. Abd pain improved tolerating regular diet  Vital Signs Last 24 Hrs  T(C): 37.1 (13 Mar 2022 05:56), Max: 37.1 (12 Mar 2022 20:36)  T(F): 98.8 (13 Mar 2022 05:56), Max: 98.8 (13 Mar 2022 05:56)  HR: 78 (13 Mar 2022 05:56) (78 - 100)  BP: 127/82 (13 Mar 2022 05:56) (127/82 - 139/80)  BP(mean): --  RR: 18 (13 Mar 2022 05:56) (18 - 18)  SpO2: 95% (13 Mar 2022 05:56) (95% - 100%)  Alert nad  Abd: soft nt nd                        10.5   6.25  )-----------( 346      ( 12 Mar 2022 06:23 )             31.6   03-12    141  |  110<H>  |  9   ----------------------------<  90  3.5   |  27  |  0.85    Ca    8.4      12 Mar 2022 06:23  Phos  3.5     03-12  Mg     2.0     03-12

## 2022-03-14 NOTE — DISCHARGE NOTE PROVIDER - NSDCCPCAREPLAN_GEN_ALL_CORE_FT
PRINCIPAL DISCHARGE DIAGNOSIS  Diagnosis: Diverticulitis  Assessment and Plan of Treatment: Please follow-up with your surgeon in 1 week. Drink plenty of fluids and rest as needed. Call for any fever over 101, nausea, vomiting, severe pain, no passing of gas or bowel movement.   DIET  You may resume your regular low fiber diet as normal.   PAIN CONTROL  You may take Motrin 600mg (with food) or Tylenol 650mg as needed for mild pain. Stagger one medication 3 hours after the other for maximum pain control. Maximum daily dose of Tylenol should not exceed 4grams/day.   Antibiotic:  please take prescribed cipro and flagyl as prescribed for 2 weeks. Please do not drink alcohol while taking this prescription. please monitor for any new symptoms such as rash, sob and please notify your provider or go to the nearest ER.

## 2022-03-14 NOTE — DISCHARGE NOTE PROVIDER - CARE PROVIDER_API CALL
Manas Gonzales (MD)  Surgery  [address] [place] [address]  Phone: [phone]  Fax: [phone]  Follow Up Time: 1 week

## 2022-03-14 NOTE — DISCHARGE NOTE NURSING/CASE MANAGEMENT/SOCIAL WORK - PATIENT PORTAL LINK FT
You can access the FollowMyHealth Patient Portal offered by [facility] by registering at the following website: [email or web address]. By joining [employer]’s FollowMyHealth portal, you will also be able to view your health information using other applications (apps) compatible with our system.

## 2022-03-14 NOTE — DISCHARGE NOTE PROVIDER - NSDCMRMEDTOKEN_GEN_ALL_CORE_FT
acetaminophen 325 mg oral tablet: 2 tab(s) orally every 6 hours, As needed, Temp greater or equal to 38C (100.4F)  ciprofloxacin 500 mg oral tablet: 1 tab(s) orally 2 times a day   metroNIDAZOLE 500 mg oral tablet: 1 tab(s) orally 3 times a day

## 2022-03-14 NOTE — DISCHARGE NOTE PROVIDER - HOSPITAL COURSE
HPI from 3/10  76 year old female with PMH of diverticulosis and no pertinent surgical history presents to the ED complaining of abdominal pain, fatigue and watery diarrhea. Patient states that on her drive home from Sunday [facility] she developed RLQ pain associated with loss of appetite. She had persistent fatigue over the past few days. She had about two days of watery diarrhea which resolved yesterday and today's bowel movement was normal color and consistency. +Flatus. Denies fever, chills, nausea, vomiting, bloody BM. Ambulates with cane. She states she gets screening colonoscopies which have been "clean." Her last one was approximately 2-3 years ago per her recollection. She admits to having a younger sister who passed away from colon cancer. Had recent hospitalization with [facility].    CT on 3/10 showed acute sigmoid diverticulitis with associated 2.5 cm intramural abscess. No peritoneal free air or pericolonic drainable collection.   Patient was admitted to surgery, under Dr. Gonzales and treated with conservative management with IV antibiotics, NPO, pain control.    Diet was advanced as tolerated and according to bowel function, and pain was well controlled on medication. At the time of discharge, the patient was hemodynamically stable, was tolerating PO diet, was voiding urine and passing flatus, was ambulating, and was comfortable with adequate pain control. The patient was instructed to follow up with Dr. Gonzales within 1-2 weeks after discharge from the hospital. The patient felt comfortable with discharge.

## 2022-03-14 NOTE — DISCHARGE NOTE NURSING/CASE MANAGEMENT/SOCIAL WORK - NSDCPEFALRISK_GEN_ALL_CORE
For information on Fall & Injury Prevention, visit: [email or web address] OR  [email or web address] OR  https://www.cdc.gov/steadi/patient.html

## 2022-03-14 NOTE — DISCHARGE NOTE NURSING/CASE MANAGEMENT/SOCIAL WORK - NSDCVIVACCINE_GEN_ALL_CORE_FT
Tdap; 25-Jun-2016 15:39; Mukesh Chaidez (RN); [employer]; [id number]; IntraMuscular; Deltoid Right.; 0.5 milliLiter(s); VIS (VIS Published: 09-May-2013, VIS Presented: 25-Jun-2016);

## 2022-03-22 ENCOUNTER — EMERGENCY (EMERGENCY)
Facility: HOSPITAL | Age: 77
LOS: 1 days | Discharge: HOME | End: 2022-03-22
Attending: EMERGENCY MEDICINE
Payer: MEDICARE

## 2022-03-22 VITALS
OXYGEN SATURATION: 98 % | HEIGHT: 63 IN | TEMPERATURE: 98 F | SYSTOLIC BLOOD PRESSURE: 149 MMHG | RESPIRATION RATE: 18 BRPM | DIASTOLIC BLOOD PRESSURE: 102 MMHG | HEART RATE: 100 BPM | WEIGHT: 182.76 LBS

## 2022-03-22 VITALS
DIASTOLIC BLOOD PRESSURE: 76 MMHG | SYSTOLIC BLOOD PRESSURE: 147 MMHG | HEART RATE: 81 BPM | RESPIRATION RATE: 18 BRPM | TEMPERATURE: 98 F | OXYGEN SATURATION: 98 %

## 2022-03-22 LAB
ALBUMIN SERPL ELPH-MCNC: 3.4 G/DL — LOW (ref 3.5–5)
ALP SERPL-CCNC: 52 U/L — SIGNIFICANT CHANGE UP (ref 40–120)
ALT FLD-CCNC: 21 U/L DA — SIGNIFICANT CHANGE UP (ref 10–60)
ANION GAP SERPL CALC-SCNC: 3 MMOL/L — LOW (ref 5–17)
APPEARANCE UR: CLEAR — SIGNIFICANT CHANGE UP
AST SERPL-CCNC: 13 U/L — SIGNIFICANT CHANGE UP (ref 10–40)
BACTERIA # UR AUTO: ABNORMAL /HPF
BASOPHILS # BLD AUTO: 0.04 K/UL — SIGNIFICANT CHANGE UP (ref 0–0.2)
BASOPHILS NFR BLD AUTO: 0.7 % — SIGNIFICANT CHANGE UP (ref 0–2)
BILIRUB SERPL-MCNC: 0.3 MG/DL — SIGNIFICANT CHANGE UP (ref 0.2–1.2)
BILIRUB UR-MCNC: NEGATIVE — SIGNIFICANT CHANGE UP
BUN SERPL-MCNC: 11 MG/DL — SIGNIFICANT CHANGE UP (ref 7–18)
CALCIUM SERPL-MCNC: 9.1 MG/DL — SIGNIFICANT CHANGE UP (ref 8.4–10.5)
CHLORIDE SERPL-SCNC: 110 MMOL/L — HIGH (ref 96–108)
CO2 SERPL-SCNC: 27 MMOL/L — SIGNIFICANT CHANGE UP (ref 22–31)
COLOR SPEC: YELLOW — SIGNIFICANT CHANGE UP
CREAT SERPL-MCNC: 0.82 MG/DL — SIGNIFICANT CHANGE UP (ref 0.5–1.3)
DIFF PNL FLD: NEGATIVE — SIGNIFICANT CHANGE UP
EGFR: 75 ML/MIN/1.73M2 — SIGNIFICANT CHANGE UP
EOSINOPHIL # BLD AUTO: 0.07 K/UL — SIGNIFICANT CHANGE UP (ref 0–0.5)
EOSINOPHIL NFR BLD AUTO: 1.1 % — SIGNIFICANT CHANGE UP (ref 0–6)
EPI CELLS # UR: ABNORMAL /HPF
GLUCOSE SERPL-MCNC: 120 MG/DL — HIGH (ref 70–99)
GLUCOSE UR QL: NEGATIVE — SIGNIFICANT CHANGE UP
HCT VFR BLD CALC: 36.6 % — SIGNIFICANT CHANGE UP (ref 34.5–45)
HGB BLD-MCNC: 12.1 G/DL — SIGNIFICANT CHANGE UP (ref 11.5–15.5)
IMM GRANULOCYTES NFR BLD AUTO: 0.2 % — SIGNIFICANT CHANGE UP (ref 0–1.5)
KETONES UR-MCNC: NEGATIVE — SIGNIFICANT CHANGE UP
LEUKOCYTE ESTERASE UR-ACNC: NEGATIVE — SIGNIFICANT CHANGE UP
LYMPHOCYTES # BLD AUTO: 0.68 K/UL — LOW (ref 1–3.3)
LYMPHOCYTES # BLD AUTO: 11.1 % — LOW (ref 13–44)
MCHC RBC-ENTMCNC: 30.2 PG — SIGNIFICANT CHANGE UP (ref 27–34)
MCHC RBC-ENTMCNC: 33.1 GM/DL — SIGNIFICANT CHANGE UP (ref 32–36)
MCV RBC AUTO: 91.3 FL — SIGNIFICANT CHANGE UP (ref 80–100)
MONOCYTES # BLD AUTO: 0.39 K/UL — SIGNIFICANT CHANGE UP (ref 0–0.9)
MONOCYTES NFR BLD AUTO: 6.4 % — SIGNIFICANT CHANGE UP (ref 2–14)
NEUTROPHILS # BLD AUTO: 4.93 K/UL — SIGNIFICANT CHANGE UP (ref 1.8–7.4)
NEUTROPHILS NFR BLD AUTO: 80.5 % — HIGH (ref 43–77)
NITRITE UR-MCNC: NEGATIVE — SIGNIFICANT CHANGE UP
NRBC # BLD: 0 /100 WBCS — SIGNIFICANT CHANGE UP (ref 0–0)
PH UR: 6 — SIGNIFICANT CHANGE UP (ref 5–8)
PLATELET # BLD AUTO: 514 K/UL — HIGH (ref 150–400)
POTASSIUM SERPL-MCNC: 3.9 MMOL/L — SIGNIFICANT CHANGE UP (ref 3.5–5.3)
POTASSIUM SERPL-SCNC: 3.9 MMOL/L — SIGNIFICANT CHANGE UP (ref 3.5–5.3)
PROT SERPL-MCNC: 7.9 G/DL — SIGNIFICANT CHANGE UP (ref 6–8.3)
PROT UR-MCNC: NEGATIVE — SIGNIFICANT CHANGE UP
RBC # BLD: 4.01 M/UL — SIGNIFICANT CHANGE UP (ref 3.8–5.2)
RBC # FLD: 14.2 % — SIGNIFICANT CHANGE UP (ref 10.3–14.5)
SODIUM SERPL-SCNC: 140 MMOL/L — SIGNIFICANT CHANGE UP (ref 135–145)
SP GR SPEC: 1.01 — SIGNIFICANT CHANGE UP (ref 1.01–1.02)
UROBILINOGEN FLD QL: NEGATIVE — SIGNIFICANT CHANGE UP
WBC # BLD: 6.12 K/UL — SIGNIFICANT CHANGE UP (ref 3.8–10.5)
WBC # FLD AUTO: 6.12 K/UL — SIGNIFICANT CHANGE UP (ref 3.8–10.5)
WBC UR QL: SIGNIFICANT CHANGE UP /HPF (ref 0–5)

## 2022-03-22 PROCEDURE — 82962 GLUCOSE BLOOD TEST: CPT

## 2022-03-22 PROCEDURE — 93005 ELECTROCARDIOGRAM TRACING: CPT

## 2022-03-22 PROCEDURE — 81001 URINALYSIS AUTO W/SCOPE: CPT

## 2022-03-22 PROCEDURE — 99283 EMERGENCY DEPT VISIT LOW MDM: CPT

## 2022-03-22 PROCEDURE — 36415 COLL VENOUS BLD VENIPUNCTURE: CPT

## 2022-03-22 PROCEDURE — 80053 COMPREHEN METABOLIC PANEL: CPT

## 2022-03-22 PROCEDURE — 99284 EMERGENCY DEPT VISIT MOD MDM: CPT

## 2022-03-22 PROCEDURE — 85025 COMPLETE CBC W/AUTO DIFF WBC: CPT

## 2022-03-22 PROCEDURE — 87086 URINE CULTURE/COLONY COUNT: CPT

## 2022-03-22 NOTE — ED PROVIDER NOTE - NEUROLOGICAL, MLM
pt has preserved oral-pharyngeal capacity for po intake and can likely manage at the very least soft mechanical and thin liquids. Note however, that once biPAP mask was removed, Pt's sats began to drop quite rapidly into the 80s.  This wa sn the time it took to present and take the pill.  Pt's pulmonary function will not presently support taking po in any meaningful amount.  The need for biPAP at rest is in itself a contraindication for po intake, given the need for extra pulmonary effort during deglutition.   This was explained to pt and wife, who verbalized understanding. .  Service will continue to follow for resumption of diet.
Alert and oriented, no focal deficits, no motor or sensory deficits. no drift. no nystagmus. no ataxia.

## 2022-03-22 NOTE — ED ADULT NURSE NOTE - OBJECTIVE STATEMENT
patient alert and oriented x4. stated was at home, wasn't feeling well checked her blood pressure and it was high. Reported feeling dizzy.

## 2022-03-22 NOTE — ED PROVIDER NOTE - PROGRESS NOTE DETAILS
feeling better and wants to go home, reviewed case and results w pt, questions answered and pt understands, advised return precautions and care plan. Will get bp recheck w personal medical doctor

## 2022-03-22 NOTE — ED PROVIDER NOTE - NSFOLLOWUPINSTRUCTIONS_ED_ALL_ED_FT
IMPORTANT INSTRUCTIONS FROM Dr. DURAND:    Please follow up with your personal medical doctor in 24-48 hours.   Bring results from today to your visit.    If you were advised to take any medications - be sure to review the package insert.    If your symptoms change, get worse or if you have any new symptoms, come to the ER right away.  If you have any questions, call the ER at the phone number on this page.

## 2022-03-22 NOTE — ED PROVIDER NOTE - PATIENT PORTAL LINK FT
You can access the FollowMyHealth Patient Portal offered by [facility] by registering at the following website: [email or web address]. By joining [employer]’s FollowMyHealth portal, you will also be able to view your health information using other applications (apps) compatible with our system.

## 2022-03-22 NOTE — ED PROVIDER NOTE - OBJECTIVE STATEMENT
76 female recent admit for divertic, still on cipro/flagyl. now w brief episode of dizziness that lasted 20 min and since resolved associated w hematuria that also resolved and concern about her bp which was 160 systolic. never had htn before. no cp or sob. no n/v/diaphoresis. no double vision or trouble swallowing or trouble walking. no fever. 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 no cp or sob. no pleuritic. no weakness to arms or legs.

## 2022-03-22 NOTE — ED ADULT TRIAGE NOTE - AS TEMP SITE
"Requested Prescriptions   Pending Prescriptions Disp Refills     escitalopram (LEXAPRO) 10 MG tablet [Pharmacy Med Name: ESCITALOPRAM 10 MG TABLET] 90 tablet 1     Sig: TAKE 1 TABLET BY MOUTH EVERY DAY    SSRIs Protocol Failed - 12/29/2018  8:43 AM       Failed - PHQ-9 score less than 5 in past 6 months    Please review last PHQ-9 score.          Passed - Patient is age 18 or older       Passed - No active pregnancy on record       Passed - No positive pregnancy test in last 12 months       Passed - Recent (6 mo) or future (30 days) visit within the authorizing provider's specialty    Patient had office visit in the last 6 months or has a visit in the next 30 days with authorizing provider or within the authorizing provider's specialty.  See \"Patient Info\" tab in inbasket, or \"Choose Columns\" in Meds & Orders section of the refill encounter.            Next 5 appointments (look out 90 days)    Jan 18, 2019  1:30 PM CST  PHYSICAL with Davie Pace MD  [facility] [place] ([facility]) [address] [place] [id number] [phone]        Medication is being filled for 1 time refill only due to:  further refills can be prescribed at appointment   Lorena Prince RN on 12/31/2018 at 9:59 AM        "
oral

## 2022-03-23 LAB
CULTURE RESULTS: SIGNIFICANT CHANGE UP
SPECIMEN SOURCE: SIGNIFICANT CHANGE UP

## 2022-05-01 NOTE — ED ADULT NURSE NOTE - CHIEF COMPLAINT QUOTE
"Daily progress note    Chief complaint  Doing better  No new complaints  Family at bedside   Denies chest pain shortness of breath palpitation    History of present illness  28-year-old white female with very complex past medical history including pulmonary embolism on Eliquis chronic orthostatic hypotension POTS syndrome hypothyroidism Phoebe-Danlos syndrome presented to [facility] emergency room with shortness of breath and she checked her oxygen saturation which was low and also feel palpitations and not feeling well.  Patient did miss her 1 dose of Eliquis.  Patient work-up in ER revealed recurrent pulm embolism despite on therapeutic dose of Eliquis admit for management.     REVIEW OF SYSTEMS  All systems reviewed and negative except for those discussed in HPI.      PHYSICAL EXAM   Blood pressure 101/72, pulse 97, temperature 99 °F (37.2 °C), temperature source Oral, resp. rate 16, height 165.1 cm (65\"), weight 111 kg (244 lb 11.4 oz), SpO2 96 %, not currently breastfeeding.    GENERAL:  Awake and alert in no respiratory distress  HENT: nares patent  EYES: no scleral icterus  CV: regular rhythm, tachycardia  RESPIRATORY: normal effort, clear to auscultation bilaterally  ABDOMEN: soft, nontender nondistended bowel sounds positive  MUSCULOSKELETAL: no deformity  NEURO: alert, moves all extremities, follows commands  SKIN: warm, dry     LAB RESULTS  Lab Results (last 24 hours)     Procedure Component Value Units Date/Time    Protime-INR [[id number]]  (Normal) Collected: 05/01/22 0644    Specimen: Blood from Hand, Right Updated: 05/01/22 1145     Protime 13.1 Seconds      INR 1.00    Hemoglobin A1c [[id number]]  (Normal) Collected: 05/01/22 0644    Specimen: Blood Updated: 05/01/22 0748     Hemoglobin A1C 5.20 %     Narrative:      Hemoglobin A1C Ranges:    Increased Risk for Diabetes  5.7% to 6.4%  Diabetes                     >= 6.5%  Diabetic Goal                < 7.0%    TSH [[id number]]  (Normal) " Collected: 05/01/22 0644    Specimen: Blood Updated: 05/01/22 0740     TSH 2.670 uIU/mL     Comprehensive Metabolic Panel [[id number]]  (Abnormal) Collected: 05/01/22 0644    Specimen: Blood Updated: 05/01/22 0738     Glucose 88 mg/dL      BUN 5 mg/dL      Creatinine 0.63 mg/dL      Sodium 135 mmol/L      Potassium 3.9 mmol/L      Chloride 103 mmol/L      CO2 20.0 mmol/L      Calcium 8.5 mg/dL      Total Protein 7.3 g/dL      Albumin 3.40 g/dL      ALT (SGPT) 31 U/L      AST (SGOT) 27 U/L      Alkaline Phosphatase 58 U/L      Total Bilirubin 0.3 mg/dL      Globulin 3.9 gm/dL      A/G Ratio 0.9 g/dL      BUN/Creatinine Ratio 7.9     Anion Gap 12.0 mmol/L      eGFR 124.9 mL/min/1.73      Comment: National Kidney Foundation and American Society of Nephrology (ASN) Task Force recommended calculation based on the Chronic Kidney Disease Epidemiology Collaboration (CKD-EPI) equation refit without adjustment for race.       Narrative:      GFR Normal >60  Chronic Kidney Disease <60  Kidney Failure <15      Lipid Panel [[id number]]  (Abnormal) Collected: 05/01/22 0644    Specimen: Blood Updated: 05/01/22 0737     Total Cholesterol 222 mg/dL      Triglycerides 295 mg/dL      HDL Cholesterol 40 mg/dL      LDL Cholesterol  129 mg/dL      VLDL Cholesterol 53 mg/dL      LDL/HDL Ratio 3.08    Narrative:      Cholesterol Reference Ranges  (U.S. Department of Health and Human Services ATP III Classifications)    Desirable          <200 mg/dL  Borderline High    200-239 mg/dL  High Risk          >240 mg/dL      Triglyceride Reference Ranges  (U.S. Department of Health and Human Services ATP III Classifications)    Normal           <150 mg/dL  Borderline High  150-199 mg/dL  High             200-499 mg/dL  Very High        >500 mg/dL    HDL Reference Ranges  (U.S. Department of Health and Human Services ATP III Classifications)    Low     <40 mg/dl (major risk factor for CHD)  High    >60 mg/dl ('negative' risk factor for  CHD)        LDL Reference Ranges  (U.S. Department of Health and Human Services ATP III Classifications)    Optimal          <100 mg/dL  Near Optimal     100-129 mg/dL  Borderline High  130-159 mg/dL  High             160-189 mg/dL  Very High        >189 mg/dL    aPTT [[id number]]  (Abnormal) Collected: 05/01/22 0644    Specimen: Blood from Hand, Right Updated: 05/01/22 0737     PTT 55.8 seconds     CBC & Differential [[id number]]  (Abnormal) Collected: 05/01/22 0644    Specimen: Blood Updated: 05/01/22 0715    Narrative:      The following orders were created for panel order CBC & Differential.  Procedure                               Abnormality         Status                     ---------                               -----------         ------                     CBC Auto Differential[[id number]]        Abnormal            Final result                 Please view results for these tests on the individual orders.    CBC Auto Differential [[id number]]  (Abnormal) Collected: 05/01/22 0644    Specimen: Blood Updated: 05/01/22 0715     WBC 11.15 10*3/mm3      RBC 4.42 10*6/mm3      Hemoglobin 9.9 g/dL      Hematocrit 33.2 %      MCV 75.1 fL      MCH 22.4 pg      MCHC 29.8 g/dL      RDW 17.0 %      RDW-SD 45.6 fl      MPV 10.2 fL      Platelets 318 10*3/mm3      Neutrophil % 65.2 %      Lymphocyte % 27.2 %      Monocyte % 5.8 %      Eosinophil % 0.9 %      Basophil % 0.5 %      Immature Grans % 0.4 %      Neutrophils, Absolute 7.27 10*3/mm3      Lymphocytes, Absolute 3.03 10*3/mm3      Monocytes, Absolute 0.65 10*3/mm3      Eosinophils, Absolute 0.10 10*3/mm3      Basophils, Absolute 0.06 10*3/mm3      Immature Grans, Absolute 0.04 10*3/mm3      nRBC 0.0 /100 WBC     Protein S Antigen, Free [[id number]] Collected: 04/30/22 1514    Specimen: Blood Updated: 04/30/22 1521    Folate [[id number]]  (Normal) Collected: 04/30/22 1405    Specimen: Blood Updated: 04/30/22 1513     Folate 5.08 ng/mL     Narrative:      Results may be  falsely increased if patient taking Biotin.      Vitamin B12 [[id number]]  (Abnormal) Collected: 04/30/22 1405    Specimen: Blood Updated: 04/30/22 1513     Vitamin B-12 <150 pg/mL     Narrative:      Results may be falsely increased if patient taking Biotin.      BNP [[id number]]  (Normal) Collected: 04/30/22 1426    Specimen: Blood from Arm, Left Updated: 04/30/22 1505     proBNP 126.0 pg/mL     Narrative:      Among patients with dyspnea, NT-proBNP is highly sensitive for the detection of acute congestive heart failure. In addition NT-proBNP of <300 pg/ml effectively rules out acute congestive heart failure with 99% negative predictive value.    Results may be falsely decreased if patient taking Biotin.      Ferritin [[id number]]  (Abnormal) Collected: 04/30/22 1426    Specimen: Blood from Arm, Left Updated: 04/30/22 1505     Ferritin 6.76 ng/mL     Narrative:      Results may be falsely decreased if patient taking Biotin.      Iron Profile [[id number]]  (Abnormal) Collected: 04/30/22 1426    Specimen: Blood from Arm, Left Updated: 04/30/22 1500     Iron 23 mcg/dL      Iron Saturation 6 %      Transferrin 241 mg/dL      TIBC 359 mcg/dL     Lactate Dehydrogenase [[id number]]  (Abnormal) Collected: 04/30/22 1426    Specimen: Blood from Arm, Left Updated: 04/30/22 1500      U/L     aPTT [[id number]]  (Abnormal) Collected: 04/30/22 1405    Specimen: Blood from Arm, Right Updated: 04/30/22 1453     PTT 72.4 seconds     Lupus Anticoagulant [[id number]] Collected: 04/30/22 1405    Specimen: Blood Updated: 04/30/22 1427    Protein S Functional [[id number]] Collected: 04/30/22 1427    Specimen: Blood Updated: 04/30/22 1427    Antithrombin III [[id number]] Collected: 04/30/22 1405    Specimen: Blood Updated: 04/30/22 1427    Anticardiolipin Antibody, IgG / M, Qn [[id number]] Collected: 04/30/22 1426    Specimen: Blood Updated: 04/30/22 1427    Factor 5 Leiden [[id number]] Collected: 04/30/22 1405    Specimen: Blood Updated:  04/30/22 1426    Factor II, DNA Analysis [[id number]] Collected: 04/30/22 1405    Specimen: Blood Updated: 04/30/22 1426    Beta-2 Glycoprotein Antibodies [[id number]] Collected: 04/30/22 1405    Specimen: Blood Updated: 04/30/22 1426        Imaging Results (Last 24 Hours)     ** No results found for the last 24 hours. **        ECG 12 Lead       HEART RATE= 125  bpm  RR Interval= 480  ms  MI Interval= 141  ms  P Horizontal Axis= 35  deg  P Front Axis= 60  deg  QRSD Interval= 79  ms  QT Interval=   ms  QRS Axis= 47  deg  T Wave Axis=   deg  - ABNORMAL ECG -  Sinus tachycardia  Borderline Q waves in inferior leads  Nonspecific T abnrm, anterolateral leads             Current Facility-Administered Medications:   •  albuterol (PROVENTIL) nebulizer solution 0.083% 2.5 mg/3mL, 2.5 mg, Nebulization, Q6H PRN, Caleb Naik MD  •  cholecalciferol (VITAMIN D3) tablet 1,000 Units, 1,000 Units, Oral, Daily, Caleb Naik MD, 1,000 Units at 05/01/22 0824  •  cyanocobalamin injection 1,000 mcg, 1,000 mcg, Intramuscular, Q28 Days, Amanda Medina MD, 1,000 mcg at 05/01/22 1250  •  dronabinol (MARINOL) capsule 5 mg, 5 mg, Oral, TID, Caleb Naik MD, 5 mg at 05/01/22 0824  •  ferric gluconate (FERRLECIT) 250 mg in sodium chloride 0.9 % 120 mL IVPB, 250 mg, Intravenous, Once, Amanda Medina MD  •  gabapentin (NEURONTIN) capsule 100 mg, 100 mg, Oral, TID, Claeb Naik MD, 100 mg at 05/01/22 0824  •  heparin (porcine) 5000 UNIT/ML injection 4,500-9,000 Units, 40-80 Units/kg (Order-Specific), Intravenous, Q6H PRN, Moe Guzman MD, 9,000 Units at 05/01/22 0824  •  heparin 27366 units/250 mL (100 units/mL) in 0.9% NaCl infusion, 13.3 Units/kg/hr (Order-Specific), Intravenous, Titrated, Moe Guzman MD, Last Rate: 17.28 mL/hr at 05/01/22 0827, 15.3 Units/kg/hr at 05/01/22 0827  •  hydrOXYzine pamoate (VISTARIL) capsule 50 mg, 50 mg, Oral, TID PRN, Caleb Naik MD  •  levothyroxine (SYNTHROID, LEVOTHROID) tablet  50 mcg, 50 mcg, Oral, Q AM, Caleb Naik MD, 50 mcg at 05/01/22 0636  •  metoprolol tartrate (LOPRESSOR) tablet 25 mg, 25 mg, Oral, TID, Caleb Naik MD, 25 mg at 05/01/22 0824  •  midodrine (PROAMATINE) tablet 10 mg, 10 mg, Oral, TID AC, Caleb Naik MD, 10 mg at 05/01/22 1250  •  montelukast (SINGULAIR) tablet 10 mg, 10 mg, Oral, Nightly, Caleb Naik MD, 10 mg at 04/30/22 1952  •  [DISCONTINUED] ondansetron (ZOFRAN) tablet 8 mg, 8 mg, Oral, Q8H PRN **OR** ondansetron (ZOFRAN) injection 4 mg, 4 mg, Intravenous, Q8H PRN, Caleb Naik MD, 4 mg at 05/01/22 0859  •  pantoprazole (PROTONIX) EC tablet 40 mg, 40 mg, Oral, Q AM, Caleb Naik MD, 40 mg at 05/01/22 0636  •  Pharmacy Consult, , Does not apply, Continuous PRN, Amanda Medina MD  •  Pharmacy to dose warfarin, , Does not apply, Continuous PRN, Amanda Medina MD  •  phenazopyridine (PYRIDIUM) tablet 100 mg, 100 mg, Oral, TID With Meals, Caleb Naik MD, 100 mg at 05/01/22 1250  •  prochlorperazine (COMPAZINE) tablet 10 mg, 10 mg, Oral, Q6H PRN, Caleb Naik MD, 10 mg at 04/30/22 1952  •  pyridostigmine (MESTINON) tablet 30 mg, 30 mg, Oral, Daily, Caleb Naik MD, 30 mg at 05/01/22 0824  •  [COMPLETED] Insert peripheral IV, , , Once **AND** sodium chloride 0.9 % flush 10 mL, 10 mL, Intravenous, PRN, Moe Guzman MD  •  Vortioxetine HBr 5 MG 5 mg, 5 mg, Oral, Daily, Caleb Naik MD  •  warfarin (COUMADIN) tablet 7.5 mg, 7.5 mg, Oral, Daily, Amanda Medina MD     ASSESSMENT  Recurrent pulm embolism involving the right lower lobe with Eliquis.  Chronic orthostatic hypotension  POTS syndrome  Phoebe-Danlos syndrome  Hypothyroidism  Irritable bowel syndrome  Severe gastroparesis  Gastroesophageal reflux disease    PLAN  CPM  Heparin and bridge with Coumadin  Adjust home medications  Stress ulcer DVT prophylaxis  Pulmonary consult appreciated  Hematology follow-up with  Supportive care  Discussed with family nursing staff  Discharge once  INR more than 2.5    ANN GONZALEZ MD         Reports elevated b/p at home

## 2022-05-15 ENCOUNTER — EMERGENCY (EMERGENCY)
Facility: HOSPITAL | Age: 77
LOS: 1 days | Discharge: HOME | End: 2022-05-15
Attending: EMERGENCY MEDICINE
Payer: MEDICARE

## 2022-05-15 VITALS
DIASTOLIC BLOOD PRESSURE: 76 MMHG | SYSTOLIC BLOOD PRESSURE: 133 MMHG | TEMPERATURE: 99 F | OXYGEN SATURATION: 99 % | HEART RATE: 76 BPM | RESPIRATION RATE: 18 BRPM

## 2022-05-15 VITALS
RESPIRATION RATE: 19 BRPM | HEART RATE: 76 BPM | SYSTOLIC BLOOD PRESSURE: 169 MMHG | DIASTOLIC BLOOD PRESSURE: 94 MMHG | WEIGHT: 179.9 LBS | TEMPERATURE: 98 F | HEIGHT: 63 IN | OXYGEN SATURATION: 98 %

## 2022-05-15 LAB
ALBUMIN SERPL ELPH-MCNC: 3.7 G/DL — SIGNIFICANT CHANGE UP (ref 3.5–5)
ALP SERPL-CCNC: 55 U/L — SIGNIFICANT CHANGE UP (ref 40–120)
ALT FLD-CCNC: 17 U/L DA — SIGNIFICANT CHANGE UP (ref 10–60)
ANION GAP SERPL CALC-SCNC: 7 MMOL/L — SIGNIFICANT CHANGE UP (ref 5–17)
AST SERPL-CCNC: 13 U/L — SIGNIFICANT CHANGE UP (ref 10–40)
BASOPHILS # BLD AUTO: 0.05 K/UL — SIGNIFICANT CHANGE UP (ref 0–0.2)
BASOPHILS NFR BLD AUTO: 0.8 % — SIGNIFICANT CHANGE UP (ref 0–2)
BILIRUB SERPL-MCNC: 0.5 MG/DL — SIGNIFICANT CHANGE UP (ref 0.2–1.2)
BUN SERPL-MCNC: 16 MG/DL — SIGNIFICANT CHANGE UP (ref 7–18)
CALCIUM SERPL-MCNC: 9.7 MG/DL — SIGNIFICANT CHANGE UP (ref 8.4–10.5)
CHLORIDE SERPL-SCNC: 107 MMOL/L — SIGNIFICANT CHANGE UP (ref 96–108)
CO2 SERPL-SCNC: 27 MMOL/L — SIGNIFICANT CHANGE UP (ref 22–31)
CREAT SERPL-MCNC: 0.83 MG/DL — SIGNIFICANT CHANGE UP (ref 0.5–1.3)
EGFR: 73 ML/MIN/1.73M2 — SIGNIFICANT CHANGE UP
EOSINOPHIL # BLD AUTO: 0.17 K/UL — SIGNIFICANT CHANGE UP (ref 0–0.5)
EOSINOPHIL NFR BLD AUTO: 2.8 % — SIGNIFICANT CHANGE UP (ref 0–6)
GLUCOSE SERPL-MCNC: 92 MG/DL — SIGNIFICANT CHANGE UP (ref 70–99)
HCT VFR BLD CALC: 35.9 % — SIGNIFICANT CHANGE UP (ref 34.5–45)
HGB BLD-MCNC: 12.3 G/DL — SIGNIFICANT CHANGE UP (ref 11.5–15.5)
IMM GRANULOCYTES NFR BLD AUTO: 0.3 % — SIGNIFICANT CHANGE UP (ref 0–1.5)
LYMPHOCYTES # BLD AUTO: 1 K/UL — SIGNIFICANT CHANGE UP (ref 1–3.3)
LYMPHOCYTES # BLD AUTO: 16.4 % — SIGNIFICANT CHANGE UP (ref 13–44)
MCHC RBC-ENTMCNC: 30.1 PG — SIGNIFICANT CHANGE UP (ref 27–34)
MCHC RBC-ENTMCNC: 34.3 GM/DL — SIGNIFICANT CHANGE UP (ref 32–36)
MCV RBC AUTO: 88 FL — SIGNIFICANT CHANGE UP (ref 80–100)
MONOCYTES # BLD AUTO: 0.65 K/UL — SIGNIFICANT CHANGE UP (ref 0–0.9)
MONOCYTES NFR BLD AUTO: 10.7 % — SIGNIFICANT CHANGE UP (ref 2–14)
NEUTROPHILS # BLD AUTO: 4.19 K/UL — SIGNIFICANT CHANGE UP (ref 1.8–7.4)
NEUTROPHILS NFR BLD AUTO: 69 % — SIGNIFICANT CHANGE UP (ref 43–77)
NRBC # BLD: 0 /100 WBCS — SIGNIFICANT CHANGE UP (ref 0–0)
PLATELET # BLD AUTO: 322 K/UL — SIGNIFICANT CHANGE UP (ref 150–400)
POTASSIUM SERPL-MCNC: 4 MMOL/L — SIGNIFICANT CHANGE UP (ref 3.5–5.3)
POTASSIUM SERPL-SCNC: 4 MMOL/L — SIGNIFICANT CHANGE UP (ref 3.5–5.3)
PROT SERPL-MCNC: 7.8 G/DL — SIGNIFICANT CHANGE UP (ref 6–8.3)
RBC # BLD: 4.08 M/UL — SIGNIFICANT CHANGE UP (ref 3.8–5.2)
RBC # FLD: 13.8 % — SIGNIFICANT CHANGE UP (ref 10.3–14.5)
SODIUM SERPL-SCNC: 141 MMOL/L — SIGNIFICANT CHANGE UP (ref 135–145)
TROPONIN I, HIGH SENSITIVITY RESULT: 7.9 NG/L — SIGNIFICANT CHANGE UP
TROPONIN I, HIGH SENSITIVITY RESULT: 7.9 NG/L — SIGNIFICANT CHANGE UP
WBC # BLD: 6.08 K/UL — SIGNIFICANT CHANGE UP (ref 3.8–10.5)
WBC # FLD AUTO: 6.08 K/UL — SIGNIFICANT CHANGE UP (ref 3.8–10.5)

## 2022-05-15 PROCEDURE — 85025 COMPLETE CBC W/AUTO DIFF WBC: CPT

## 2022-05-15 PROCEDURE — 93010 ELECTROCARDIOGRAM REPORT: CPT

## 2022-05-15 PROCEDURE — 99285 EMERGENCY DEPT VISIT HI MDM: CPT | Mod: 25

## 2022-05-15 PROCEDURE — 80053 COMPREHEN METABOLIC PANEL: CPT

## 2022-05-15 PROCEDURE — 71046 X-RAY EXAM CHEST 2 VIEWS: CPT | Mod: 26

## 2022-05-15 PROCEDURE — 93005 ELECTROCARDIOGRAM TRACING: CPT

## 2022-05-15 PROCEDURE — 99284 EMERGENCY DEPT VISIT MOD MDM: CPT

## 2022-05-15 PROCEDURE — 84484 ASSAY OF TROPONIN QUANT: CPT

## 2022-05-15 PROCEDURE — 36415 COLL VENOUS BLD VENIPUNCTURE: CPT

## 2022-05-15 PROCEDURE — 71046 X-RAY EXAM CHEST 2 VIEWS: CPT

## 2022-05-15 RX ORDER — ASPIRIN/CALCIUM CARB/MAGNESIUM 324 MG
324 TABLET ORAL ONCE
Refills: 0 | Status: COMPLETED | OUTPATIENT
Start: 2022-05-15 | End: 2022-05-15

## 2022-05-15 RX ADMIN — Medication 324 MILLIGRAM(S): at 19:39

## 2022-05-15 NOTE — ED ADULT NURSE NOTE - OBJECTIVE STATEMENT
Pt. c/o chest pressure that goes up to her throat that started today. Pt. denies any shortness of breath or dizziness.

## 2022-05-15 NOTE — ED PROVIDER NOTE - CLINICAL SUMMARY MEDICAL DECISION MAKING FREE TEXT BOX
77 year old female presenting with chest pain. Will get EKG, troponin, and Asprin. Reassess. Low risk patient and will follow up with a cardiologist.

## 2022-05-15 NOTE — ED PROVIDER NOTE - NSFOLLOWUPINSTRUCTIONS_ED_ALL_ED_FT
Dr Allan Torres  Address: [id number], [place] [address]  Hours:   Closed · Opens 9AM Mon  Phone: [phone]      Chest Pain    Chest pain can be caused by many different conditions which may or may not be dangerous. Causes include heartburn, lung infections, heart attack, blood clot in lungs, skin infections, strain or damage to muscle, cartilage, or bones, etc. In addition to a history and physical examination, an electrocardiogram (ECG) or other lab tests may have been performed to determine the cause of your chest pain. Follow up with your primary care provider or with a cardiologist as instructed.     SEEK IMMEDIATE MEDICAL CARE IF YOU HAVE ANY OF THE FOLLOWING SYMPTOMS: worsening chest pain, coughing up blood, unexplained back/neck/jaw pain, severe abdominal pain, dizziness or lightheadedness, fainting, shortness of breath, sweaty or clammy skin, vomiting, or racing heart beat. These symptoms may represent a serious problem that is an emergency. Do not wait to see if the symptoms will go away. Get medical help right away. Call 911 and do not drive yourself to the hospital.

## 2022-05-15 NOTE — ED PROVIDER NOTE - PATIENT PORTAL LINK FT
You can access the FollowMyHealth Patient Portal offered by [facility] by registering at the following website: [email or web address]. By joining [facility]’s FollowMyHealth portal, you will also be able to view your health information using other applications (apps) compatible with our system.

## 2022-05-15 NOTE — ED PROVIDER NOTE - OBJECTIVE STATEMENT
77 year old female with no PMHx is presenting to the ED with chief complaint of chest pain. Patient states that she started to have chest pain 2 hours ago and the chest pain lasted 1.5 hours. Currently there is no pain anymore. Patient denies lightheadedness, shortness of breath, fatigue, sweating, and non-radiating. NKDA.

## 2022-07-02 ENCOUNTER — INPATIENT (INPATIENT)
Facility: HOSPITAL | Age: 77
LOS: 6 days | Discharge: HOME | DRG: 392 | End: 2022-07-09
Attending: SURGERY | Admitting: SURGERY
Payer: MEDICARE

## 2022-07-02 VITALS
OXYGEN SATURATION: 98 % | RESPIRATION RATE: 17 BRPM | SYSTOLIC BLOOD PRESSURE: 118 MMHG | HEIGHT: 63 IN | DIASTOLIC BLOOD PRESSURE: 73 MMHG | TEMPERATURE: 99 F | WEIGHT: 182.98 LBS | HEART RATE: 97 BPM

## 2022-07-02 PROCEDURE — 99285 EMERGENCY DEPT VISIT HI MDM: CPT

## 2022-07-02 RX ORDER — SODIUM CHLORIDE 9 MG/ML
1000 INJECTION INTRAMUSCULAR; INTRAVENOUS; SUBCUTANEOUS ONCE
Refills: 0 | Status: COMPLETED | OUTPATIENT
Start: 2022-07-02 | End: 2022-07-02

## 2022-07-02 RX ORDER — MORPHINE SULFATE 50 MG/1
2 CAPSULE, EXTENDED RELEASE ORAL ONCE
Refills: 0 | Status: DISCONTINUED | OUTPATIENT
Start: 2022-07-02 | End: 2022-07-02

## 2022-07-02 NOTE — ED ADULT NURSE NOTE - PAIN: BODY LOCATION
KENALOG 40-       NDC-[id number]    LOT-[id number]   EXP 9/18   [place] 0.5-% NZN-[id number]    LOT-[id number]  EXP 8/18   LIDOCAINE1%      NDC-0409-4276-02    LOT --[id number]  EXP 9/18
flank/Right:

## 2022-07-02 NOTE — ED ADULT NURSE NOTE - OBJECTIVE STATEMENT
Pt presents to ED with c/o right flank pain radiating to lower abdomen. Pt reports difficulty ambulating. Pt denies  symptoms. Pt presents to ED with c/o right flank pain radiating to lower abdomen. Pt reports difficulty ambulating. Pt denies  symptoms. Tenderness noted to right lower abdomen. Pt ambulates with a cane.

## 2022-07-02 NOTE — ED ADULT NURSE NOTE - ED STAT RN HANDOFF DETAILS
Patient admitted to surgery in no acute distress . Endorsed to hold RN Yulissa , patient being transported via stretcher with IV fluids in progress safety maintained.

## 2022-07-02 NOTE — ED ADULT NURSE NOTE - NSIMPLEMENTINTERV_GEN_ALL_ED
Implemented All Fall Risk Interventions:  [place] to call system. Call bell, personal items and telephone within reach. Instruct patient to call for assistance. Room bathroom lighting operational. Non-slip footwear when patient is off stretcher. Physically safe environment: no spills, clutter or unnecessary equipment. Stretcher in lowest position, wheels locked, appropriate side rails in place. Provide visual cue, wrist band, yellow gown, etc. Monitor gait and stability. Monitor for mental status changes and reorient to person, place, and time. Review medications for side effects contributing to fall risk. Reinforce activity limits and safety measures with patient and family.

## 2022-07-02 NOTE — ED ADULT NURSE NOTE - ED STAT RN HANDOFF DETAILS 2
Pt admitted to surgery. Medicated for abdominal pain with morphine 2mg. Report given to Ruma OCHOA [address] [place].

## 2022-07-03 DIAGNOSIS — K57.20 DIVERTICULITIS OF LARGE INTESTINE WITH PERFORATION AND ABSCESS WITHOUT BLEEDING: ICD-10-CM

## 2022-07-03 LAB
ALBUMIN SERPL ELPH-MCNC: 3.3 G/DL — LOW (ref 3.5–5)
ALP SERPL-CCNC: 62 U/L — SIGNIFICANT CHANGE UP (ref 40–120)
ALT FLD-CCNC: 13 U/L DA — SIGNIFICANT CHANGE UP (ref 10–60)
ANION GAP SERPL CALC-SCNC: 8 MMOL/L — SIGNIFICANT CHANGE UP (ref 5–17)
APPEARANCE UR: CLEAR — SIGNIFICANT CHANGE UP
APTT BLD: 26.9 SEC — LOW (ref 27.5–35.5)
AST SERPL-CCNC: 12 U/L — SIGNIFICANT CHANGE UP (ref 10–40)
BACTERIA # UR AUTO: ABNORMAL /HPF
BASOPHILS # BLD AUTO: 0.04 K/UL — SIGNIFICANT CHANGE UP (ref 0–0.2)
BASOPHILS NFR BLD AUTO: 0.3 % — SIGNIFICANT CHANGE UP (ref 0–2)
BILIRUB SERPL-MCNC: 1.2 MG/DL — SIGNIFICANT CHANGE UP (ref 0.2–1.2)
BILIRUB UR-MCNC: NEGATIVE — SIGNIFICANT CHANGE UP
BUN SERPL-MCNC: 12 MG/DL — SIGNIFICANT CHANGE UP (ref 7–18)
CALCIUM SERPL-MCNC: 9.3 MG/DL — SIGNIFICANT CHANGE UP (ref 8.4–10.5)
CHLORIDE SERPL-SCNC: 106 MMOL/L — SIGNIFICANT CHANGE UP (ref 96–108)
CO2 SERPL-SCNC: 26 MMOL/L — SIGNIFICANT CHANGE UP (ref 22–31)
COLOR SPEC: YELLOW — SIGNIFICANT CHANGE UP
CREAT SERPL-MCNC: 0.92 MG/DL — SIGNIFICANT CHANGE UP (ref 0.5–1.3)
DIFF PNL FLD: NEGATIVE — SIGNIFICANT CHANGE UP
EGFR: 65 ML/MIN/1.73M2 — SIGNIFICANT CHANGE UP
EOSINOPHIL # BLD AUTO: 0.06 K/UL — SIGNIFICANT CHANGE UP (ref 0–0.5)
EOSINOPHIL NFR BLD AUTO: 0.4 % — SIGNIFICANT CHANGE UP (ref 0–6)
EPI CELLS # UR: SIGNIFICANT CHANGE UP /HPF
GLUCOSE SERPL-MCNC: 101 MG/DL — HIGH (ref 70–99)
GLUCOSE UR QL: NEGATIVE — SIGNIFICANT CHANGE UP
HCT VFR BLD CALC: 35.4 % — SIGNIFICANT CHANGE UP (ref 34.5–45)
HGB BLD-MCNC: 12.2 G/DL — SIGNIFICANT CHANGE UP (ref 11.5–15.5)
IMM GRANULOCYTES NFR BLD AUTO: 0.3 % — SIGNIFICANT CHANGE UP (ref 0–1.5)
INR BLD: 1.11 RATIO — SIGNIFICANT CHANGE UP (ref 0.88–1.16)
KETONES UR-MCNC: ABNORMAL
LACTATE SERPL-SCNC: 0.6 MMOL/L — LOW (ref 0.7–2)
LEUKOCYTE ESTERASE UR-ACNC: ABNORMAL
LIDOCAIN IGE QN: 69 U/L — LOW (ref 73–393)
LYMPHOCYTES # BLD AUTO: 0.72 K/UL — LOW (ref 1–3.3)
LYMPHOCYTES # BLD AUTO: 5.4 % — LOW (ref 13–44)
MAGNESIUM SERPL-MCNC: 2 MG/DL — SIGNIFICANT CHANGE UP (ref 1.6–2.6)
MCHC RBC-ENTMCNC: 30.7 PG — SIGNIFICANT CHANGE UP (ref 27–34)
MCHC RBC-ENTMCNC: 34.5 GM/DL — SIGNIFICANT CHANGE UP (ref 32–36)
MCV RBC AUTO: 88.9 FL — SIGNIFICANT CHANGE UP (ref 80–100)
MONOCYTES # BLD AUTO: 0.79 K/UL — SIGNIFICANT CHANGE UP (ref 0–0.9)
MONOCYTES NFR BLD AUTO: 5.9 % — SIGNIFICANT CHANGE UP (ref 2–14)
NEUTROPHILS # BLD AUTO: 11.7 K/UL — HIGH (ref 1.8–7.4)
NEUTROPHILS NFR BLD AUTO: 87.7 % — HIGH (ref 43–77)
NITRITE UR-MCNC: POSITIVE
NRBC # BLD: 0 /100 WBCS — SIGNIFICANT CHANGE UP (ref 0–0)
PH UR: 6 — SIGNIFICANT CHANGE UP (ref 5–8)
PLATELET # BLD AUTO: 334 K/UL — SIGNIFICANT CHANGE UP (ref 150–400)
POTASSIUM SERPL-MCNC: 3.9 MMOL/L — SIGNIFICANT CHANGE UP (ref 3.5–5.3)
POTASSIUM SERPL-SCNC: 3.9 MMOL/L — SIGNIFICANT CHANGE UP (ref 3.5–5.3)
PROT SERPL-MCNC: 7.8 G/DL — SIGNIFICANT CHANGE UP (ref 6–8.3)
PROT UR-MCNC: NEGATIVE — SIGNIFICANT CHANGE UP
PROTHROM AB SERPL-ACNC: 13.2 SEC — SIGNIFICANT CHANGE UP (ref 10.5–13.4)
RBC # BLD: 3.98 M/UL — SIGNIFICANT CHANGE UP (ref 3.8–5.2)
RBC # FLD: 14.3 % — SIGNIFICANT CHANGE UP (ref 10.3–14.5)
RBC CASTS # UR COMP ASSIST: SIGNIFICANT CHANGE UP /HPF (ref 0–2)
SARS-COV-2 RNA SPEC QL NAA+PROBE: SIGNIFICANT CHANGE UP
SODIUM SERPL-SCNC: 140 MMOL/L — SIGNIFICANT CHANGE UP (ref 135–145)
SP GR SPEC: 1.01 — SIGNIFICANT CHANGE UP (ref 1.01–1.02)
UROBILINOGEN FLD QL: NEGATIVE — SIGNIFICANT CHANGE UP
WBC # BLD: 13.35 K/UL — HIGH (ref 3.8–10.5)
WBC # FLD AUTO: 13.35 K/UL — HIGH (ref 3.8–10.5)
WBC UR QL: SIGNIFICANT CHANGE UP /HPF (ref 0–5)

## 2022-07-03 PROCEDURE — 74177 CT ABD & PELVIS W/CONTRAST: CPT | Mod: 26,MA

## 2022-07-03 PROCEDURE — 99222 1ST HOSP IP/OBS MODERATE 55: CPT | Mod: FS

## 2022-07-03 RX ORDER — ONDANSETRON 8 MG/1
4 TABLET, FILM COATED ORAL EVERY 6 HOURS
Refills: 0 | Status: DISCONTINUED | OUTPATIENT
Start: 2022-07-03 | End: 2022-07-09

## 2022-07-03 RX ORDER — HEPARIN SODIUM 5000 [USP'U]/ML
5000 INJECTION INTRAVENOUS; SUBCUTANEOUS EVERY 8 HOURS
Refills: 0 | Status: DISCONTINUED | OUTPATIENT
Start: 2022-07-03 | End: 2022-07-09

## 2022-07-03 RX ORDER — METRONIDAZOLE 500 MG
500 TABLET ORAL EVERY 8 HOURS
Refills: 0 | Status: DISCONTINUED | OUTPATIENT
Start: 2022-07-03 | End: 2022-07-09

## 2022-07-03 RX ORDER — MORPHINE SULFATE 50 MG/1
4 CAPSULE, EXTENDED RELEASE ORAL ONCE
Refills: 0 | Status: DISCONTINUED | OUTPATIENT
Start: 2022-07-03 | End: 2022-07-03

## 2022-07-03 RX ORDER — CEFEPIME 1 G/1
1000 INJECTION, POWDER, FOR SOLUTION INTRAMUSCULAR; INTRAVENOUS ONCE
Refills: 0 | Status: DISCONTINUED | OUTPATIENT
Start: 2022-07-03 | End: 2022-07-03

## 2022-07-03 RX ORDER — CEFEPIME 1 G/1
2000 INJECTION, POWDER, FOR SOLUTION INTRAMUSCULAR; INTRAVENOUS EVERY 8 HOURS
Refills: 0 | Status: DISCONTINUED | OUTPATIENT
Start: 2022-07-03 | End: 2022-07-09

## 2022-07-03 RX ORDER — KETOROLAC TROMETHAMINE 30 MG/ML
30 SYRINGE (ML) INJECTION ONCE
Refills: 0 | Status: DISCONTINUED | OUTPATIENT
Start: 2022-07-03 | End: 2022-07-03

## 2022-07-03 RX ORDER — DEXTROSE MONOHYDRATE, SODIUM CHLORIDE, AND POTASSIUM CHLORIDE 50; .745; 4.5 G/1000ML; G/1000ML; G/1000ML
1000 INJECTION, SOLUTION INTRAVENOUS
Refills: 0 | Status: DISCONTINUED | OUTPATIENT
Start: 2022-07-03 | End: 2022-07-08

## 2022-07-03 RX ORDER — MORPHINE SULFATE 50 MG/1
2 CAPSULE, EXTENDED RELEASE ORAL EVERY 4 HOURS
Refills: 0 | Status: DISCONTINUED | OUTPATIENT
Start: 2022-07-03 | End: 2022-07-09

## 2022-07-03 RX ADMIN — HEPARIN SODIUM 5000 UNIT(S): 5000 INJECTION INTRAVENOUS; SUBCUTANEOUS at 21:35

## 2022-07-03 RX ADMIN — CEFEPIME 100 MILLIGRAM(S): 1 INJECTION, POWDER, FOR SOLUTION INTRAMUSCULAR; INTRAVENOUS at 13:17

## 2022-07-03 RX ADMIN — MORPHINE SULFATE 2 MILLIGRAM(S): 50 CAPSULE, EXTENDED RELEASE ORAL at 17:18

## 2022-07-03 RX ADMIN — Medication 100 MILLIGRAM(S): at 13:17

## 2022-07-03 RX ADMIN — MORPHINE SULFATE 4 MILLIGRAM(S): 50 CAPSULE, EXTENDED RELEASE ORAL at 02:40

## 2022-07-03 RX ADMIN — Medication 100 MILLIGRAM(S): at 21:34

## 2022-07-03 RX ADMIN — MORPHINE SULFATE 2 MILLIGRAM(S): 50 CAPSULE, EXTENDED RELEASE ORAL at 08:46

## 2022-07-03 RX ADMIN — MORPHINE SULFATE 4 MILLIGRAM(S): 50 CAPSULE, EXTENDED RELEASE ORAL at 02:04

## 2022-07-03 RX ADMIN — CEFEPIME 100 MILLIGRAM(S): 1 INJECTION, POWDER, FOR SOLUTION INTRAMUSCULAR; INTRAVENOUS at 21:35

## 2022-07-03 RX ADMIN — CEFEPIME 100 MILLIGRAM(S): 1 INJECTION, POWDER, FOR SOLUTION INTRAMUSCULAR; INTRAVENOUS at 07:17

## 2022-07-03 RX ADMIN — HEPARIN SODIUM 5000 UNIT(S): 5000 INJECTION INTRAVENOUS; SUBCUTANEOUS at 07:16

## 2022-07-03 RX ADMIN — SODIUM CHLORIDE 1000 MILLILITER(S): 9 INJECTION INTRAMUSCULAR; INTRAVENOUS; SUBCUTANEOUS at 00:13

## 2022-07-03 RX ADMIN — DEXTROSE MONOHYDRATE, SODIUM CHLORIDE, AND POTASSIUM CHLORIDE 125 MILLILITER(S): 50; .745; 4.5 INJECTION, SOLUTION INTRAVENOUS at 08:46

## 2022-07-03 RX ADMIN — MORPHINE SULFATE 2 MILLIGRAM(S): 50 CAPSULE, EXTENDED RELEASE ORAL at 09:16

## 2022-07-03 RX ADMIN — Medication 100 MILLIGRAM(S): at 07:40

## 2022-07-03 RX ADMIN — HEPARIN SODIUM 5000 UNIT(S): 5000 INJECTION INTRAVENOUS; SUBCUTANEOUS at 13:17

## 2022-07-03 RX ADMIN — MORPHINE SULFATE 2 MILLIGRAM(S): 50 CAPSULE, EXTENDED RELEASE ORAL at 00:45

## 2022-07-03 RX ADMIN — MORPHINE SULFATE 2 MILLIGRAM(S): 50 CAPSULE, EXTENDED RELEASE ORAL at 00:13

## 2022-07-03 RX ADMIN — Medication 30 MILLIGRAM(S): at 03:20

## 2022-07-03 RX ADMIN — MORPHINE SULFATE 2 MILLIGRAM(S): 50 CAPSULE, EXTENDED RELEASE ORAL at 16:13

## 2022-07-03 RX ADMIN — Medication 30 MILLIGRAM(S): at 03:50

## 2022-07-03 NOTE — H&P ADULT - NSHPPHYSICALEXAM_GEN_ALL_CORE
T(C): 36.8 (07-03-22 @ 04:55), Max: 37.4 (07-02-22 @ 22:10)  HR: 105 (07-03-22 @ 04:55) (97 - 105)  BP: 116/72 (07-03-22 @ 04:55) (116/72 - 133/84)  RR: 18 (07-03-22 @ 04:55) (17 - 18)  SpO2: 97% (07-03-22 @ 04:55) (97% - 100%)    CONSTITUTIONAL: Well groomed, no apparent distress  EYES: PERRLA and symmetric, EOMI, No conjunctival or scleral injection, non-icteric  NECK: Supple, symmetric and without tracheal deviation  RESPIRATORY: No respiratory distress, no use of accessory muscles; CTA b/l, no wheezes, rales or rhonchi  CARDIOVASCULAR: RRRR, +S1S2, no murmurs, no rubs, no gallops  GASTROINTESTINAL: Soft, lower abd tenderness, non distended, mild rebound, no guarding  MUSCULOSKELETAL: Normal gait and station; no digital clubbing or cyanosis  SKIN: No rashes or ulcers noted; no subcutaneous nodules or induration palpable  PSYCHIATRIC: Appropriate insight/judgment; A+O x 3, mood and affect appropriate, recent/remote memory intact

## 2022-07-03 NOTE — ED PROVIDER NOTE - PHYSICAL EXAMINATION
General: well appearing female, no acute distress   HEENT: normocephalic, atraumatic   Respiratory: normal work of breathing, lungs clear to auscultation bilaterally   Cardiac: regular rate and rhythm   Abdomen: soft, diffuse lower abdominal tenderness to palpation, no CVA tenderness    MSK: no swelling or tenderness of lower extremities, moving all extremities spontaneously   Skin: warm, dry   Neuro: A&Ox3  Psych: appropriate affect

## 2022-07-03 NOTE — H&P ADULT - ASSESSMENT
78 y/o female with h/o diverticulitis recently admitted in March with intramural abscess  denies any other sig PMH or PSH  prev h/o neg colonoscopies per pt  presenting with two days of lower abdominal pain. pain radiates to her back. worse with movement. no pain or burning with urination, no nausea, vomiting, diarrhea, chest pain or shortness of breath    CT c/w perf diverticulitis with abscess

## 2022-07-03 NOTE — ED ADULT NURSE REASSESSMENT NOTE - NS ED NURSE REASSESS COMMENT FT1
Received patient admitted to surgery in no acute distress , NPO status maintained, IV fluids to be started at present time denies any pain or discomfort notes more with movement. No bed available continue to monitor patient.

## 2022-07-03 NOTE — H&P ADULT - HISTORY OF PRESENT ILLNESS
78 y/o female with h/o diverticulitis recently admitted in March with intramural abscess  denies any other sig PMH or PSH  prev h/o neg colonoscopies per pt  presenting with two days of lower abdominal pain. pain radiates to her back. worse with movement. no pain or burning with urination, no nausea, vomiting, diarrhea, chest pain or shortness of breath    < from: CT Abdomen and Pelvis w/ IV Cont (07.03.22 @ 05:47) >    FINDINGS:    LOWER CHEST: Atelectasis. Trace pericardial fluid.    LIVER: Subcentimeter hypodense foci, too small to characterize.  GALLBLADDER/BILE DUCTS: No intrahepatic or extrahepatic biliary   dilatation. Slight layering in the gallbladder, which may be due to   sludge.  PANCREAS: Dilated duct.  SPLEEN: Unremarkable.    ADRENALS: Unremarkable.  KIDNEYS/URETERS: No hydronephrosis, hydroureter or significant   perinephric stranding. Suggest bilateral renal scarring Subcentimeter   hypodense focus in the right kidney, too small to characterize.  BLADDER: Partially distended. Prominent wall.  REPRODUCTIVE ORGANS: Hysterectomy.    BOWEL: Prominent wall of the visualized distal esophagus and distal   stomach. Question small hiatal hernia. Colon diverticulosis. Sigmoid   colon wall thickening with peridiverticular inflammatory change. No bowel   obstruction. Unremarkable appendix.  PERITONEUM: Extraluminal air surrounding the sigmoid colon diverticulitis   (6:80). Small ascites with peritoneal enhancement in the visualized lower   abdomen/pelvis with somewhat loculated, 7.0 x 3.0 x 1.0 cm (transverse x   AP x CC) rim-enhancing fluid collection (2:114) between the sigmoid colon   and bladder dome. Again noted, stranding in the visualized left upper   quadrant.  VESSELS: Atherosclerosis. Normal caliber of the abdominal aorta.  RETROPERITONEUM: No lymphadenopathy.  ABDOMINAL WALL/SOFT TISSUES: Small fat-containing umbilical hernia.  BONES: Degenerative changes of the spine. Stable grade 1 anterolisthesis   of L5 on S1. Stable anterior wedging/endplate depression of the spine.    IMPRESSION:    Perforated sigmoid colon diverticulitis complicated by extraluminal air   and fluid collection/developing abscess(es).    Prominent wall of the visualized distal esophagus and distal stomach,   which may be due to partial distention and/or esophagitis/gastritis.   Recommend correlation with patient's symptoms and endoscopy as indicated.    Prominent bladder wall, which may be reactive given adjacent inflammation   and partial distention. Recommend clinical correlation to assess urinary   tract infection.    Pancreatic ductal dilatation. Recommend correlation with MRCP/MRI.    < end of copied text >

## 2022-07-03 NOTE — ED PROVIDER NOTE - OBJECTIVE STATEMENT
77F, no significant pmh, presenting with two days of lower abdominal pain. pain radiates to her back. worse with movement. no pain or burning with urination, nausea, vomiting, diarrhea, chest pain or shortness of breath.

## 2022-07-03 NOTE — ED PROVIDER NOTE - NSFOLLOWUPINSTRUCTIONS_ED_ALL_ED_FT
You were seen in the emergency department for abdominal pain.     Please follow-up with your primary care doctor in the next 24-48 hours.     If you have any worsening symptoms, severe headache, chest pain, shortness of breath, nausea or vomiting, please return to the emergency department.

## 2022-07-03 NOTE — PATIENT PROFILE ADULT - FALL HARM RISK - HARM RISK INTERVENTIONS
Assistance with ambulation/Assistance OOB with selected safe patient handling equipment/Communicate Risk of Fall with Harm to all staff/Discuss with provider need for PT consult/Monitor gait and stability/Reinforce activity limits and safety measures with patient and family/Tailored Fall Risk Interventions/Visual Cue: Yellow wristband and red socks/Bed in lowest position, wheels locked, appropriate side rails in place/Call bell, personal items and telephone in reach/Instruct patient to call for assistance before getting out of bed or chair/Non-slip footwear when patient is out of bed/[place] to call system/Physically safe environment - no spills, clutter or unnecessary equipment/Purposeful Proactive Rounding/Room/bathroom lighting operational, light cord in reach Assistance with ambulation/Assistance OOB with selected safe patient handling equipment/Communicate Risk of Fall with Harm to all staff/Discuss with provider need for PT consult/Monitor gait and stability/Provide patient with walking aids - walker, cane, crutches/Reinforce activity limits and safety measures with patient and family/Tailored Fall Risk Interventions/Visual Cue: Yellow wristband and red socks/Bed in lowest position, wheels locked, appropriate side rails in place/Call bell, personal items and telephone in reach/Instruct patient to call for assistance before getting out of bed or chair/Non-slip footwear when patient is out of bed/[place] to call system/Physically safe environment - no spills, clutter or unnecessary equipment/Purposeful Proactive Rounding/Room/bathroom lighting operational, light cord in reach

## 2022-07-04 PROCEDURE — 99232 SBSQ HOSP IP/OBS MODERATE 35: CPT | Mod: FS

## 2022-07-04 RX ORDER — PANTOPRAZOLE SODIUM 20 MG/1
40 TABLET, DELAYED RELEASE ORAL DAILY
Refills: 0 | Status: DISCONTINUED | OUTPATIENT
Start: 2022-07-04 | End: 2022-07-09

## 2022-07-04 RX ADMIN — CEFEPIME 100 MILLIGRAM(S): 1 INJECTION, POWDER, FOR SOLUTION INTRAMUSCULAR; INTRAVENOUS at 05:42

## 2022-07-04 RX ADMIN — Medication 100 MILLIGRAM(S): at 06:54

## 2022-07-04 RX ADMIN — DEXTROSE MONOHYDRATE, SODIUM CHLORIDE, AND POTASSIUM CHLORIDE 125 MILLILITER(S): 50; .745; 4.5 INJECTION, SOLUTION INTRAVENOUS at 03:34

## 2022-07-04 RX ADMIN — PANTOPRAZOLE SODIUM 40 MILLIGRAM(S): 20 TABLET, DELAYED RELEASE ORAL at 13:34

## 2022-07-04 RX ADMIN — HEPARIN SODIUM 5000 UNIT(S): 5000 INJECTION INTRAVENOUS; SUBCUTANEOUS at 21:17

## 2022-07-04 RX ADMIN — CEFEPIME 100 MILLIGRAM(S): 1 INJECTION, POWDER, FOR SOLUTION INTRAMUSCULAR; INTRAVENOUS at 21:17

## 2022-07-04 RX ADMIN — Medication 100 MILLIGRAM(S): at 13:34

## 2022-07-04 RX ADMIN — HEPARIN SODIUM 5000 UNIT(S): 5000 INJECTION INTRAVENOUS; SUBCUTANEOUS at 06:00

## 2022-07-04 RX ADMIN — Medication 100 MILLIGRAM(S): at 21:17

## 2022-07-04 RX ADMIN — HEPARIN SODIUM 5000 UNIT(S): 5000 INJECTION INTRAVENOUS; SUBCUTANEOUS at 13:35

## 2022-07-04 RX ADMIN — CEFEPIME 100 MILLIGRAM(S): 1 INJECTION, POWDER, FOR SOLUTION INTRAMUSCULAR; INTRAVENOUS at 13:34

## 2022-07-04 RX ADMIN — DEXTROSE MONOHYDRATE, SODIUM CHLORIDE, AND POTASSIUM CHLORIDE 125 MILLILITER(S): 50; .745; 4.5 INJECTION, SOLUTION INTRAVENOUS at 21:17

## 2022-07-05 LAB
-  AMIKACIN: SIGNIFICANT CHANGE UP
-  AMOXICILLIN/CLAVULANIC ACID: SIGNIFICANT CHANGE UP
-  AMPICILLIN/SULBACTAM: SIGNIFICANT CHANGE UP
-  AMPICILLIN: SIGNIFICANT CHANGE UP
-  AZTREONAM: SIGNIFICANT CHANGE UP
-  CEFAZOLIN: SIGNIFICANT CHANGE UP
-  CEFEPIME: SIGNIFICANT CHANGE UP
-  CEFOXITIN: SIGNIFICANT CHANGE UP
-  CEFTRIAXONE: SIGNIFICANT CHANGE UP
-  CIPROFLOXACIN: SIGNIFICANT CHANGE UP
-  ERTAPENEM: SIGNIFICANT CHANGE UP
-  GENTAMICIN: SIGNIFICANT CHANGE UP
-  IMIPENEM: SIGNIFICANT CHANGE UP
-  LEVOFLOXACIN: SIGNIFICANT CHANGE UP
-  MEROPENEM: SIGNIFICANT CHANGE UP
-  PIPERACILLIN/TAZOBACTAM: SIGNIFICANT CHANGE UP
-  TIGECYCLINE: SIGNIFICANT CHANGE UP
-  TOBRAMYCIN: SIGNIFICANT CHANGE UP
-  TRIMETHOPRIM/SULFAMETHOXAZOLE: SIGNIFICANT CHANGE UP
ANION GAP SERPL CALC-SCNC: 7 MMOL/L — SIGNIFICANT CHANGE UP (ref 5–17)
BASOPHILS # BLD AUTO: 0.03 K/UL — SIGNIFICANT CHANGE UP (ref 0–0.2)
BASOPHILS NFR BLD AUTO: 0.4 % — SIGNIFICANT CHANGE UP (ref 0–2)
BUN SERPL-MCNC: 7 MG/DL — SIGNIFICANT CHANGE UP (ref 7–18)
CALCIUM SERPL-MCNC: 8.5 MG/DL — SIGNIFICANT CHANGE UP (ref 8.4–10.5)
CHLORIDE SERPL-SCNC: 111 MMOL/L — HIGH (ref 96–108)
CO2 SERPL-SCNC: 24 MMOL/L — SIGNIFICANT CHANGE UP (ref 22–31)
CREAT SERPL-MCNC: 0.83 MG/DL — SIGNIFICANT CHANGE UP (ref 0.5–1.3)
CULTURE RESULTS: SIGNIFICANT CHANGE UP
EGFR: 73 ML/MIN/1.73M2 — SIGNIFICANT CHANGE UP
EOSINOPHIL # BLD AUTO: 0.31 K/UL — SIGNIFICANT CHANGE UP (ref 0–0.5)
EOSINOPHIL NFR BLD AUTO: 4.5 % — SIGNIFICANT CHANGE UP (ref 0–6)
GLUCOSE SERPL-MCNC: 125 MG/DL — HIGH (ref 70–99)
HCT VFR BLD CALC: 31.8 % — LOW (ref 34.5–45)
HGB BLD-MCNC: 10.7 G/DL — LOW (ref 11.5–15.5)
IMM GRANULOCYTES NFR BLD AUTO: 0.7 % — SIGNIFICANT CHANGE UP (ref 0–1.5)
LYMPHOCYTES # BLD AUTO: 0.55 K/UL — LOW (ref 1–3.3)
LYMPHOCYTES # BLD AUTO: 7.9 % — LOW (ref 13–44)
MCHC RBC-ENTMCNC: 30.1 PG — SIGNIFICANT CHANGE UP (ref 27–34)
MCHC RBC-ENTMCNC: 33.6 GM/DL — SIGNIFICANT CHANGE UP (ref 32–36)
MCV RBC AUTO: 89.3 FL — SIGNIFICANT CHANGE UP (ref 80–100)
METHOD TYPE: SIGNIFICANT CHANGE UP
MONOCYTES # BLD AUTO: 0.73 K/UL — SIGNIFICANT CHANGE UP (ref 0–0.9)
MONOCYTES NFR BLD AUTO: 10.5 % — SIGNIFICANT CHANGE UP (ref 2–14)
NEUTROPHILS # BLD AUTO: 5.27 K/UL — SIGNIFICANT CHANGE UP (ref 1.8–7.4)
NEUTROPHILS NFR BLD AUTO: 76 % — SIGNIFICANT CHANGE UP (ref 43–77)
NRBC # BLD: 0 /100 WBCS — SIGNIFICANT CHANGE UP (ref 0–0)
ORGANISM # SPEC MICROSCOPIC CNT: SIGNIFICANT CHANGE UP
ORGANISM # SPEC MICROSCOPIC CNT: SIGNIFICANT CHANGE UP
PLATELET # BLD AUTO: 290 K/UL — SIGNIFICANT CHANGE UP (ref 150–400)
POTASSIUM SERPL-MCNC: 4 MMOL/L — SIGNIFICANT CHANGE UP (ref 3.5–5.3)
POTASSIUM SERPL-SCNC: 4 MMOL/L — SIGNIFICANT CHANGE UP (ref 3.5–5.3)
RBC # BLD: 3.56 M/UL — LOW (ref 3.8–5.2)
RBC # FLD: 14 % — SIGNIFICANT CHANGE UP (ref 10.3–14.5)
SODIUM SERPL-SCNC: 142 MMOL/L — SIGNIFICANT CHANGE UP (ref 135–145)
SPECIMEN SOURCE: SIGNIFICANT CHANGE UP
WBC # BLD: 6.94 K/UL — SIGNIFICANT CHANGE UP (ref 3.8–10.5)
WBC # FLD AUTO: 6.94 K/UL — SIGNIFICANT CHANGE UP (ref 3.8–10.5)

## 2022-07-05 PROCEDURE — 99232 SBSQ HOSP IP/OBS MODERATE 35: CPT | Mod: FS

## 2022-07-05 RX ADMIN — Medication 100 MILLIGRAM(S): at 23:34

## 2022-07-05 RX ADMIN — PANTOPRAZOLE SODIUM 40 MILLIGRAM(S): 20 TABLET, DELAYED RELEASE ORAL at 11:17

## 2022-07-05 RX ADMIN — CEFEPIME 100 MILLIGRAM(S): 1 INJECTION, POWDER, FOR SOLUTION INTRAMUSCULAR; INTRAVENOUS at 13:03

## 2022-07-05 RX ADMIN — HEPARIN SODIUM 5000 UNIT(S): 5000 INJECTION INTRAVENOUS; SUBCUTANEOUS at 05:55

## 2022-07-05 RX ADMIN — Medication 100 MILLIGRAM(S): at 05:55

## 2022-07-05 RX ADMIN — CEFEPIME 100 MILLIGRAM(S): 1 INJECTION, POWDER, FOR SOLUTION INTRAMUSCULAR; INTRAVENOUS at 23:33

## 2022-07-05 RX ADMIN — Medication 100 MILLIGRAM(S): at 13:04

## 2022-07-05 RX ADMIN — HEPARIN SODIUM 5000 UNIT(S): 5000 INJECTION INTRAVENOUS; SUBCUTANEOUS at 23:34

## 2022-07-05 RX ADMIN — HEPARIN SODIUM 5000 UNIT(S): 5000 INJECTION INTRAVENOUS; SUBCUTANEOUS at 13:03

## 2022-07-05 RX ADMIN — CEFEPIME 100 MILLIGRAM(S): 1 INJECTION, POWDER, FOR SOLUTION INTRAMUSCULAR; INTRAVENOUS at 05:55

## 2022-07-05 RX ADMIN — DEXTROSE MONOHYDRATE, SODIUM CHLORIDE, AND POTASSIUM CHLORIDE 125 MILLILITER(S): 50; .745; 4.5 INJECTION, SOLUTION INTRAVENOUS at 05:56

## 2022-07-06 LAB
ANION GAP SERPL CALC-SCNC: 5 MMOL/L — SIGNIFICANT CHANGE UP (ref 5–17)
BUN SERPL-MCNC: 6 MG/DL — LOW (ref 7–18)
CALCIUM SERPL-MCNC: 8.5 MG/DL — SIGNIFICANT CHANGE UP (ref 8.4–10.5)
CHLORIDE SERPL-SCNC: 111 MMOL/L — HIGH (ref 96–108)
CO2 SERPL-SCNC: 26 MMOL/L — SIGNIFICANT CHANGE UP (ref 22–31)
CREAT SERPL-MCNC: 0.83 MG/DL — SIGNIFICANT CHANGE UP (ref 0.5–1.3)
EGFR: 73 ML/MIN/1.73M2 — SIGNIFICANT CHANGE UP
GLUCOSE SERPL-MCNC: 122 MG/DL — HIGH (ref 70–99)
HCT VFR BLD CALC: 29.9 % — LOW (ref 34.5–45)
HGB BLD-MCNC: 9.9 G/DL — LOW (ref 11.5–15.5)
MCHC RBC-ENTMCNC: 30 PG — SIGNIFICANT CHANGE UP (ref 27–34)
MCHC RBC-ENTMCNC: 33.1 GM/DL — SIGNIFICANT CHANGE UP (ref 32–36)
MCV RBC AUTO: 90.6 FL — SIGNIFICANT CHANGE UP (ref 80–100)
NRBC # BLD: 0 /100 WBCS — SIGNIFICANT CHANGE UP (ref 0–0)
PLATELET # BLD AUTO: 297 K/UL — SIGNIFICANT CHANGE UP (ref 150–400)
POTASSIUM SERPL-MCNC: 4.2 MMOL/L — SIGNIFICANT CHANGE UP (ref 3.5–5.3)
POTASSIUM SERPL-SCNC: 4.2 MMOL/L — SIGNIFICANT CHANGE UP (ref 3.5–5.3)
RBC # BLD: 3.3 M/UL — LOW (ref 3.8–5.2)
RBC # FLD: 13.7 % — SIGNIFICANT CHANGE UP (ref 10.3–14.5)
SODIUM SERPL-SCNC: 142 MMOL/L — SIGNIFICANT CHANGE UP (ref 135–145)
WBC # BLD: 5.18 K/UL — SIGNIFICANT CHANGE UP (ref 3.8–10.5)
WBC # FLD AUTO: 5.18 K/UL — SIGNIFICANT CHANGE UP (ref 3.8–10.5)

## 2022-07-06 PROCEDURE — 99232 SBSQ HOSP IP/OBS MODERATE 35: CPT | Mod: FS

## 2022-07-06 RX ADMIN — DEXTROSE MONOHYDRATE, SODIUM CHLORIDE, AND POTASSIUM CHLORIDE 125 MILLILITER(S): 50; .745; 4.5 INJECTION, SOLUTION INTRAVENOUS at 03:15

## 2022-07-06 RX ADMIN — DEXTROSE MONOHYDRATE, SODIUM CHLORIDE, AND POTASSIUM CHLORIDE 50 MILLILITER(S): 50; .745; 4.5 INJECTION, SOLUTION INTRAVENOUS at 20:13

## 2022-07-06 RX ADMIN — CEFEPIME 100 MILLIGRAM(S): 1 INJECTION, POWDER, FOR SOLUTION INTRAMUSCULAR; INTRAVENOUS at 13:07

## 2022-07-06 RX ADMIN — Medication 100 MILLIGRAM(S): at 05:06

## 2022-07-06 RX ADMIN — HEPARIN SODIUM 5000 UNIT(S): 5000 INJECTION INTRAVENOUS; SUBCUTANEOUS at 13:08

## 2022-07-06 RX ADMIN — HEPARIN SODIUM 5000 UNIT(S): 5000 INJECTION INTRAVENOUS; SUBCUTANEOUS at 21:47

## 2022-07-06 RX ADMIN — Medication 100 MILLIGRAM(S): at 21:47

## 2022-07-06 RX ADMIN — CEFEPIME 100 MILLIGRAM(S): 1 INJECTION, POWDER, FOR SOLUTION INTRAMUSCULAR; INTRAVENOUS at 05:05

## 2022-07-06 RX ADMIN — PANTOPRAZOLE SODIUM 40 MILLIGRAM(S): 20 TABLET, DELAYED RELEASE ORAL at 13:08

## 2022-07-06 RX ADMIN — Medication 100 MILLIGRAM(S): at 13:07

## 2022-07-06 RX ADMIN — HEPARIN SODIUM 5000 UNIT(S): 5000 INJECTION INTRAVENOUS; SUBCUTANEOUS at 05:05

## 2022-07-06 RX ADMIN — CEFEPIME 100 MILLIGRAM(S): 1 INJECTION, POWDER, FOR SOLUTION INTRAMUSCULAR; INTRAVENOUS at 21:47

## 2022-07-06 NOTE — DIETITIAN INITIAL EVALUATION ADULT - OTHER INFO
Pt visited. She reports taking a little clear liquids (not much appetite and a little afraid). Pt was here in March (acute sigmoid diverticulitis) and January (covid). She reports some weight loss (12#) first states from March and the from Covid (decreased appetite/ intake). She reports being happy about her weight loss. Howver, weights do not seem to reflect wt loss, current wt is stated

## 2022-07-06 NOTE — DIETITIAN INITIAL EVALUATION ADULT - PERTINENT MEDS FT
MEDICATIONS  (STANDING):  cefepime   IVPB 2000 milliGRAM(s) IV Intermittent every 8 hours  dextrose 5% + sodium chloride 0.45% with potassium chloride 20 mEq/L 1000 milliLiter(s) (50 mL/Hr) IV Continuous <Continuous>  heparin   Injectable 5000 Unit(s) SubCutaneous every 8 hours  metroNIDAZOLE  IVPB 500 milliGRAM(s) IV Intermittent every 8 hours  pantoprazole  Injectable 40 milliGRAM(s) IV Push daily    MEDICATIONS  (PRN):  morphine  - Injectable 2 milliGRAM(s) IV Push every 4 hours PRN Moderate Pain (4 - 6)  ondansetron Injectable 4 milliGRAM(s) IV Push every 6 hours PRN Nausea and/or Vomiting

## 2022-07-06 NOTE — DIETITIAN INITIAL EVALUATION ADULT - PERTINENT LABORATORY DATA
07-06    142  |  111<H>  |  6<L>  ----------------------------<  122<H>  4.2   |  26  |  0.83    Ca    8.5      06 Jul 2022 06:34    A1C with Estimated Average Glucose Result: 6.3 % (12-30-21 @ 11:41)

## 2022-07-07 LAB
ANION GAP SERPL CALC-SCNC: 9 MMOL/L — SIGNIFICANT CHANGE UP (ref 5–17)
BASOPHILS # BLD AUTO: 0.03 K/UL — SIGNIFICANT CHANGE UP (ref 0–0.2)
BASOPHILS NFR BLD AUTO: 0.8 % — SIGNIFICANT CHANGE UP (ref 0–2)
BUN SERPL-MCNC: 7 MG/DL — SIGNIFICANT CHANGE UP (ref 7–18)
CALCIUM SERPL-MCNC: 8.9 MG/DL — SIGNIFICANT CHANGE UP (ref 8.4–10.5)
CHLORIDE SERPL-SCNC: 109 MMOL/L — HIGH (ref 96–108)
CO2 SERPL-SCNC: 24 MMOL/L — SIGNIFICANT CHANGE UP (ref 22–31)
CREAT SERPL-MCNC: 0.81 MG/DL — SIGNIFICANT CHANGE UP (ref 0.5–1.3)
EGFR: 75 ML/MIN/1.73M2 — SIGNIFICANT CHANGE UP
EOSINOPHIL # BLD AUTO: 0.28 K/UL — SIGNIFICANT CHANGE UP (ref 0–0.5)
EOSINOPHIL NFR BLD AUTO: 7.3 % — HIGH (ref 0–6)
GLUCOSE SERPL-MCNC: 109 MG/DL — HIGH (ref 70–99)
HCT VFR BLD CALC: 32.2 % — LOW (ref 34.5–45)
HGB BLD-MCNC: 10.6 G/DL — LOW (ref 11.5–15.5)
IMM GRANULOCYTES NFR BLD AUTO: 1 % — SIGNIFICANT CHANGE UP (ref 0–1.5)
LYMPHOCYTES # BLD AUTO: 0.61 K/UL — LOW (ref 1–3.3)
LYMPHOCYTES # BLD AUTO: 16 % — SIGNIFICANT CHANGE UP (ref 13–44)
MCHC RBC-ENTMCNC: 30 PG — SIGNIFICANT CHANGE UP (ref 27–34)
MCHC RBC-ENTMCNC: 32.9 GM/DL — SIGNIFICANT CHANGE UP (ref 32–36)
MCV RBC AUTO: 91.2 FL — SIGNIFICANT CHANGE UP (ref 80–100)
MONOCYTES # BLD AUTO: 0.49 K/UL — SIGNIFICANT CHANGE UP (ref 0–0.9)
MONOCYTES NFR BLD AUTO: 12.8 % — SIGNIFICANT CHANGE UP (ref 2–14)
NEUTROPHILS # BLD AUTO: 2.37 K/UL — SIGNIFICANT CHANGE UP (ref 1.8–7.4)
NEUTROPHILS NFR BLD AUTO: 62.1 % — SIGNIFICANT CHANGE UP (ref 43–77)
NRBC # BLD: 0 /100 WBCS — SIGNIFICANT CHANGE UP (ref 0–0)
PLATELET # BLD AUTO: 313 K/UL — SIGNIFICANT CHANGE UP (ref 150–400)
POTASSIUM SERPL-MCNC: 3.8 MMOL/L — SIGNIFICANT CHANGE UP (ref 3.5–5.3)
POTASSIUM SERPL-SCNC: 3.8 MMOL/L — SIGNIFICANT CHANGE UP (ref 3.5–5.3)
RBC # BLD: 3.53 M/UL — LOW (ref 3.8–5.2)
RBC # FLD: 14 % — SIGNIFICANT CHANGE UP (ref 10.3–14.5)
SODIUM SERPL-SCNC: 142 MMOL/L — SIGNIFICANT CHANGE UP (ref 135–145)
WBC # BLD: 3.82 K/UL — SIGNIFICANT CHANGE UP (ref 3.8–10.5)
WBC # FLD AUTO: 3.82 K/UL — SIGNIFICANT CHANGE UP (ref 3.8–10.5)

## 2022-07-07 PROCEDURE — 99232 SBSQ HOSP IP/OBS MODERATE 35: CPT | Mod: FS

## 2022-07-07 PROCEDURE — 74177 CT ABD & PELVIS W/CONTRAST: CPT | Mod: 26

## 2022-07-07 RX ORDER — DIATRIZOATE MEGLUMINE 180 MG/ML
30 INJECTION, SOLUTION INTRAVESICAL ONCE
Refills: 0 | Status: COMPLETED | OUTPATIENT
Start: 2022-07-07 | End: 2022-07-07

## 2022-07-07 RX ADMIN — CEFEPIME 100 MILLIGRAM(S): 1 INJECTION, POWDER, FOR SOLUTION INTRAMUSCULAR; INTRAVENOUS at 21:03

## 2022-07-07 RX ADMIN — Medication 100 MILLIGRAM(S): at 06:13

## 2022-07-07 RX ADMIN — CEFEPIME 100 MILLIGRAM(S): 1 INJECTION, POWDER, FOR SOLUTION INTRAMUSCULAR; INTRAVENOUS at 05:17

## 2022-07-07 RX ADMIN — DEXTROSE MONOHYDRATE, SODIUM CHLORIDE, AND POTASSIUM CHLORIDE 50 MILLILITER(S): 50; .745; 4.5 INJECTION, SOLUTION INTRAVENOUS at 21:18

## 2022-07-07 RX ADMIN — HEPARIN SODIUM 5000 UNIT(S): 5000 INJECTION INTRAVENOUS; SUBCUTANEOUS at 21:04

## 2022-07-07 RX ADMIN — HEPARIN SODIUM 5000 UNIT(S): 5000 INJECTION INTRAVENOUS; SUBCUTANEOUS at 15:52

## 2022-07-07 RX ADMIN — HEPARIN SODIUM 5000 UNIT(S): 5000 INJECTION INTRAVENOUS; SUBCUTANEOUS at 05:17

## 2022-07-07 RX ADMIN — Medication 100 MILLIGRAM(S): at 21:04

## 2022-07-07 RX ADMIN — DIATRIZOATE MEGLUMINE 30 MILLILITER(S): 180 INJECTION, SOLUTION INTRAVESICAL at 11:15

## 2022-07-07 RX ADMIN — Medication 100 MILLIGRAM(S): at 15:51

## 2022-07-07 RX ADMIN — PANTOPRAZOLE SODIUM 40 MILLIGRAM(S): 20 TABLET, DELAYED RELEASE ORAL at 12:27

## 2022-07-07 RX ADMIN — CEFEPIME 100 MILLIGRAM(S): 1 INJECTION, POWDER, FOR SOLUTION INTRAMUSCULAR; INTRAVENOUS at 15:51

## 2022-07-07 NOTE — PROGRESS NOTE ADULT - NS ATTEND AMEND GEN_ALL_CORE FT
Pt seen and examined. Reports feeling better, denies abdominal pain. Abd- soft NT, ND, no guarding no rebound.  Anxious to get CT done.   Plan f/u repeat CT to determine need for IR drainage. If improved will advance diet.

## 2022-07-08 ENCOUNTER — TRANSCRIPTION ENCOUNTER (OUTPATIENT)
Age: 77
End: 2022-07-08

## 2022-07-08 LAB
ANION GAP SERPL CALC-SCNC: 9 MMOL/L — SIGNIFICANT CHANGE UP (ref 5–17)
BUN SERPL-MCNC: 8 MG/DL — SIGNIFICANT CHANGE UP (ref 7–18)
CALCIUM SERPL-MCNC: 9.2 MG/DL — SIGNIFICANT CHANGE UP (ref 8.4–10.5)
CHLORIDE SERPL-SCNC: 107 MMOL/L — SIGNIFICANT CHANGE UP (ref 96–108)
CO2 SERPL-SCNC: 25 MMOL/L — SIGNIFICANT CHANGE UP (ref 22–31)
CREAT SERPL-MCNC: 0.81 MG/DL — SIGNIFICANT CHANGE UP (ref 0.5–1.3)
EGFR: 75 ML/MIN/1.73M2 — SIGNIFICANT CHANGE UP
GLUCOSE SERPL-MCNC: 116 MG/DL — HIGH (ref 70–99)
HCT VFR BLD CALC: 35.1 % — SIGNIFICANT CHANGE UP (ref 34.5–45)
HGB BLD-MCNC: 11.8 G/DL — SIGNIFICANT CHANGE UP (ref 11.5–15.5)
MCHC RBC-ENTMCNC: 30.3 PG — SIGNIFICANT CHANGE UP (ref 27–34)
MCHC RBC-ENTMCNC: 33.6 GM/DL — SIGNIFICANT CHANGE UP (ref 32–36)
MCV RBC AUTO: 90.2 FL — SIGNIFICANT CHANGE UP (ref 80–100)
NRBC # BLD: 0 /100 WBCS — SIGNIFICANT CHANGE UP (ref 0–0)
PLATELET # BLD AUTO: 334 K/UL — SIGNIFICANT CHANGE UP (ref 150–400)
POTASSIUM SERPL-MCNC: 3.8 MMOL/L — SIGNIFICANT CHANGE UP (ref 3.5–5.3)
POTASSIUM SERPL-SCNC: 3.8 MMOL/L — SIGNIFICANT CHANGE UP (ref 3.5–5.3)
RBC # BLD: 3.89 M/UL — SIGNIFICANT CHANGE UP (ref 3.8–5.2)
RBC # FLD: 13.8 % — SIGNIFICANT CHANGE UP (ref 10.3–14.5)
SODIUM SERPL-SCNC: 141 MMOL/L — SIGNIFICANT CHANGE UP (ref 135–145)
WBC # BLD: 4.29 K/UL — SIGNIFICANT CHANGE UP (ref 3.8–10.5)
WBC # FLD AUTO: 4.29 K/UL — SIGNIFICANT CHANGE UP (ref 3.8–10.5)

## 2022-07-08 PROCEDURE — 99232 SBSQ HOSP IP/OBS MODERATE 35: CPT | Mod: FS

## 2022-07-08 RX ORDER — METRONIDAZOLE 500 MG
1 TABLET ORAL
Qty: 21 | Refills: 0
Start: 2022-07-08 | End: 2022-07-14

## 2022-07-08 RX ORDER — CEFUROXIME AXETIL 250 MG
1 TABLET ORAL
Qty: 14 | Refills: 0
Start: 2022-07-08 | End: 2022-07-14

## 2022-07-08 RX ADMIN — CEFEPIME 100 MILLIGRAM(S): 1 INJECTION, POWDER, FOR SOLUTION INTRAMUSCULAR; INTRAVENOUS at 21:48

## 2022-07-08 RX ADMIN — Medication 100 MILLIGRAM(S): at 05:12

## 2022-07-08 RX ADMIN — HEPARIN SODIUM 5000 UNIT(S): 5000 INJECTION INTRAVENOUS; SUBCUTANEOUS at 21:48

## 2022-07-08 RX ADMIN — HEPARIN SODIUM 5000 UNIT(S): 5000 INJECTION INTRAVENOUS; SUBCUTANEOUS at 14:30

## 2022-07-08 RX ADMIN — CEFEPIME 100 MILLIGRAM(S): 1 INJECTION, POWDER, FOR SOLUTION INTRAMUSCULAR; INTRAVENOUS at 05:11

## 2022-07-08 RX ADMIN — Medication 100 MILLIGRAM(S): at 14:36

## 2022-07-08 RX ADMIN — Medication 100 MILLIGRAM(S): at 21:48

## 2022-07-08 RX ADMIN — PANTOPRAZOLE SODIUM 40 MILLIGRAM(S): 20 TABLET, DELAYED RELEASE ORAL at 12:06

## 2022-07-08 RX ADMIN — CEFEPIME 100 MILLIGRAM(S): 1 INJECTION, POWDER, FOR SOLUTION INTRAMUSCULAR; INTRAVENOUS at 14:10

## 2022-07-08 RX ADMIN — HEPARIN SODIUM 5000 UNIT(S): 5000 INJECTION INTRAVENOUS; SUBCUTANEOUS at 05:11

## 2022-07-08 NOTE — DISCHARGE NOTE PROVIDER - NSDCCPCAREPLAN_GEN_ALL_CORE_FT
PRINCIPAL DISCHARGE DIAGNOSIS  Diagnosis: Diverticulitis of colon with perforation  Assessment and Plan of Treatment: Please follow-up with your surgeon in 1 week. Drink plenty of fluids and rest as needed. Call for any fever over 101, nausea, vomiting, severe pain, no passing of gas or bowel movement.   DIET  You may resume your regular diet as normal.   PAIN CONTROL  You may take Motrin 600mg (with food) or Tylenol 650mg as needed for mild pain. Stagger one medication 3 hours after the other for maximum pain control. Maximum daily dose of Tylenol should not exceed 4grams/day.   ANTIBIOTICS  Please complete full course of antibiotics as prescribed  GI FOLLOW UP :   Please schedule appoitnment with GI Dr. Cerrato for findings on CT

## 2022-07-08 NOTE — DISCHARGE NOTE PROVIDER - ATTENDING DISCHARGE PHYSICAL EXAMINATION:
Pt seen and examined on 7/8 at time decision made for discharge. Labs wnl. Abd exam benign. Continue po Cipro/flagyl as outpatient.

## 2022-07-08 NOTE — DISCHARGE NOTE PROVIDER - PROVIDER TOKENS
PROVIDER:[TOKEN:[[id number]],FOLLOWUP:[1 week]],PROVIDER:[TOKEN:[[id number]],FOLLOWUP:[1 week]]

## 2022-07-08 NOTE — DISCHARGE NOTE PROVIDER - NSDCMRMEDTOKEN_GEN_ALL_CORE_FT
acetaminophen 325 mg oral tablet: 2 tab(s) orally every 6 hours, As needed, Temp greater or equal to 38C (100.4F)  cefuroxime 500 mg oral tablet: 1 tab(s) orally 2 times a day   metroNIDAZOLE 500 mg oral tablet: 1 tab(s) orally every 8 hours

## 2022-07-08 NOTE — DISCHARGE NOTE PROVIDER - HOSPITAL COURSE
78 y/o female with h/o diverticulitis recently admitted in March with intramural abscess  denies any other sig PMH or PSH prev h/o neg colonoscopies per pt presenting with two days of lower abdominal pain. pain radiates to her back. worse with movement. no pain or burning with urination, no nausea, vomiting, diarrhea, chest pain or shortness of breath. Pt has CT 7/3 which showed Perforated sigmoid colon diverticulitis complicated by extraluminal air and fluid collection/developing abscess(es). Prominent wall of the visualized distal esophagus and distal stomach,   which may be due to partial distention and/or esophagitis/gastritis. Recommend correlation with patient's symptoms and endoscopy as indicated.  Pancreatic ductal dilatation, possible cystitis. Recommend correlation with MRCP/MRI. Pt was admitted to the hospital for conservative management of perforated sigmoid diverticulitis extraluminal air and fluid collection/developing abscess. Pt was treated with IV abx, diet was slowly advanced as tolerated. Urine culture was obtained and antibioitcs were adjusted accordingly. A repeat CT was done on 7/7 which showed sigmoid diverticulitis, improved from prior imaging, with no drainable dcolleciton.   Pt was advanced to regular diet. At the time of discharge, the patient was hemodynamically stable, was tolerating PO diet, was voiding urine, was ambulating, and was comfortable with adequate pain control. The patient was instructed to follow up with Dr. Pablo within 1-2 weeks after discharge from the hospital. The patient/family felt comfortable with discharge. The patient is stable and ready for discharge to home. Pt also instructed to follow up with GI Dr. Cerrato outpatient for further evaluation of dilated pancreatic duct, and prominent wall of distal esophagus/distal stomach. Pt likely will need MRI/MRCP and EGD by GI. Pt dc home with oral antibiotics and outpatient follow up

## 2022-07-09 ENCOUNTER — TRANSCRIPTION ENCOUNTER (OUTPATIENT)
Age: 77
End: 2022-07-09

## 2022-07-09 VITALS
DIASTOLIC BLOOD PRESSURE: 86 MMHG | HEART RATE: 98 BPM | SYSTOLIC BLOOD PRESSURE: 137 MMHG | RESPIRATION RATE: 17 BRPM | OXYGEN SATURATION: 100 % | TEMPERATURE: 98 F

## 2022-07-09 PROCEDURE — 87635 SARS-COV-2 COVID-19 AMP PRB: CPT

## 2022-07-09 PROCEDURE — 87086 URINE CULTURE/COLONY COUNT: CPT

## 2022-07-09 PROCEDURE — 83690 ASSAY OF LIPASE: CPT

## 2022-07-09 PROCEDURE — 74177 CT ABD & PELVIS W/CONTRAST: CPT | Mod: MA

## 2022-07-09 PROCEDURE — 81001 URINALYSIS AUTO W/SCOPE: CPT

## 2022-07-09 PROCEDURE — 83735 ASSAY OF MAGNESIUM: CPT

## 2022-07-09 PROCEDURE — 87186 SC STD MICRODIL/AGAR DIL: CPT

## 2022-07-09 PROCEDURE — 99285 EMERGENCY DEPT VISIT HI MDM: CPT | Mod: 25

## 2022-07-09 PROCEDURE — 85025 COMPLETE CBC W/AUTO DIFF WBC: CPT

## 2022-07-09 PROCEDURE — 36415 COLL VENOUS BLD VENIPUNCTURE: CPT

## 2022-07-09 PROCEDURE — 96375 TX/PRO/DX INJ NEW DRUG ADDON: CPT

## 2022-07-09 PROCEDURE — 99238 HOSP IP/OBS DSCHRG MGMT 30/<: CPT

## 2022-07-09 PROCEDURE — 80053 COMPREHEN METABOLIC PANEL: CPT

## 2022-07-09 PROCEDURE — 96374 THER/PROPH/DIAG INJ IV PUSH: CPT

## 2022-07-09 PROCEDURE — 83605 ASSAY OF LACTIC ACID: CPT

## 2022-07-09 PROCEDURE — 96376 TX/PRO/DX INJ SAME DRUG ADON: CPT

## 2022-07-09 PROCEDURE — 87077 CULTURE AEROBIC IDENTIFY: CPT

## 2022-07-09 PROCEDURE — 80048 BASIC METABOLIC PNL TOTAL CA: CPT

## 2022-07-09 PROCEDURE — 85610 PROTHROMBIN TIME: CPT

## 2022-07-09 PROCEDURE — 85730 THROMBOPLASTIN TIME PARTIAL: CPT

## 2022-07-09 PROCEDURE — 85027 COMPLETE CBC AUTOMATED: CPT

## 2022-07-09 RX ADMIN — HEPARIN SODIUM 5000 UNIT(S): 5000 INJECTION INTRAVENOUS; SUBCUTANEOUS at 05:01

## 2022-07-09 RX ADMIN — CEFEPIME 100 MILLIGRAM(S): 1 INJECTION, POWDER, FOR SOLUTION INTRAMUSCULAR; INTRAVENOUS at 05:01

## 2022-07-09 RX ADMIN — Medication 100 MILLIGRAM(S): at 05:01

## 2022-07-09 NOTE — DISCHARGE NOTE NURSING/CASE MANAGEMENT/SOCIAL WORK - NSDCPEFALRISK_GEN_ALL_CORE
For information on Fall & Injury Prevention, visit: [email or web address] OR  [email or web address] OR  https://www.cdc.gov/steadi/patient.html

## 2022-07-09 NOTE — PROGRESS NOTE ADULT - ASSESSMENT
hx perf diverticulitis with abscess, clinically improved with abx and bowel rest    trail clear liquid diet  pt on iv abx  will discuss with attending possible repeat CT scan, timing.
77y.o. Female with resolving diverticulitis    -d/c home today with abx  -Outpt f/u with Dr. Pablo for diverticulosis management
77y.o. Female with resolving perforated diverticulitis with abscess    -Rpt CT abd/pelvis today  -Keep clears until CT  -Poss adv diet if improvement  -DVT ppx  -OOB ambulate  
77 y.o. Female with resolving perforated diverticulitis with abscess. Rpt CT 7/7 improved     -Low fiber diet as tolerated   -DVT ppx  -OOB ambulate  -F/u AM labs   -D/c planning   -Discussed with Dr. Pablo
77 yoF with perforated sigmoid diverticulitis     pain radiates to back, no CVAT, abd benign  denies n/v, +flatus no BM  wbc 13 yesterday, AM labs pending    - NPO, IVF  - abx, bowel rest  - dvt/gi ppx  - serial abd exams  
77 yoF with perforated sigmoid diverticulitis   leukocytosis resolved, afebrile    - NPO, IVF  - IV antibiotics  - DVT/GI ppx

## 2022-07-09 NOTE — PROGRESS NOTE ADULT - SUBJECTIVE AND OBJECTIVE BOX
INTERVAL HPI/OVERNIGHT EVENTS:    No acute events overnight.   Pt resting comfortably.  pain radiating to back  denies n/v/f/c        MEDICATIONS  (STANDING):  cefepime   IVPB 2000 milliGRAM(s) IV Intermittent every 8 hours  dextrose 5% + sodium chloride 0.45% with potassium chloride 20 mEq/L 1000 milliLiter(s) (125 mL/Hr) IV Continuous <Continuous>  heparin   Injectable 5000 Unit(s) SubCutaneous every 8 hours  metroNIDAZOLE  IVPB 500 milliGRAM(s) IV Intermittent every 8 hours    MEDICATIONS  (PRN):  morphine  - Injectable 2 milliGRAM(s) IV Push every 4 hours PRN Moderate Pain (4 - 6)  ondansetron Injectable 4 milliGRAM(s) IV Push every 6 hours PRN Nausea and/or Vomiting      Vital Signs Last 24 Hrs  T(C): 36.9 (04 Jul 2022 05:57), Max: 37.3 (03 Jul 2022 15:42)  T(F): 98.5 (04 Jul 2022 05:57), Max: 99.2 (03 Jul 2022 15:42)  HR: 81 (04 Jul 2022 05:57) (72 - 103)  BP: 121/62 (04 Jul 2022 05:57) (121/62 - 159/81)  BP(mean): --  RR: 18 (04 Jul 2022 05:57) (16 - 18)  SpO2: 98% (04 Jul 2022 05:57) (94% - 99%)      PHYSICAL EXAM  General: Alert and oriented, not in acute distress  Resp: Breathing unlabored  Abdomen: Soft, nondistended, nontender  : No fonseca catheter, no dysuria or hematuria  Extremities: No pedal edema    I&O's Detail      LABS:                        12.2   13.35 )-----------( 334      ( 03 Jul 2022 00:19 )             35.4             07-03    140  |  106  |  12  ----------------------------<  101<H>  3.9   |  26  |  0.92    Ca    9.3      03 Jul 2022 00:19  Mg     2.0     07-03    TPro  7.8  /  Alb  3.3<L>  /  TBili  1.2  /  DBili  x   /  AST  12  /  ALT  13  /  AlkPhos  62  07-03      
INTERVAL HPI/OVERNIGHT EVENTS:  Pt seen and examined at bedside  Abdominal pain improving  Requesting to eat  +flatus/-BM  Denied nausea/vomiting, fever or chills    MEDICATIONS  (STANDING):  cefepime   IVPB 2000 milliGRAM(s) IV Intermittent every 8 hours  dextrose 5% + sodium chloride 0.45% with potassium chloride 20 mEq/L 1000 milliLiter(s) (125 mL/Hr) IV Continuous <Continuous>  heparin   Injectable 5000 Unit(s) SubCutaneous every 8 hours  metroNIDAZOLE  IVPB 500 milliGRAM(s) IV Intermittent every 8 hours  pantoprazole  Injectable 40 milliGRAM(s) IV Push daily    MEDICATIONS  (PRN):  morphine  - Injectable 2 milliGRAM(s) IV Push every 4 hours PRN Moderate Pain (4 - 6)  ondansetron Injectable 4 milliGRAM(s) IV Push every 6 hours PRN Nausea and/or Vomiting      Vital Signs Last 24 Hrs  T(C): 36.9 (05 Jul 2022 06:27), Max: 37 (04 Jul 2022 12:48)  T(F): 98.4 (05 Jul 2022 06:27), Max: 98.6 (04 Jul 2022 12:48)  HR: 73 (05 Jul 2022 06:27) (73 - 84)  BP: 165/77 (05 Jul 2022 06:27) (129/73 - 165/77)  RR: 18 (05 Jul 2022 06:27) (18 - 18)  SpO2: 97% (05 Jul 2022 06:27) (96% - 97%)    Physical:  General: A&Ox3. NAD  Chest: respiration unlabored  Abdomen: Soft nondistended, +LLQ/RLQ tenderness.    I&O's Detail    04 Jul 2022 07:01  -  05 Jul 2022 07:00  --------------------------------------------------------  IN:    dextrose 5% + sodium chloride 0.45% w/ Additives: 1500 mL    IV PiggyBack: 150 mL  Total IN: 1650 mL    OUT:  Total OUT: 0 mL    Total NET: 1650 mL    LABS:                        10.7   6.94  )-----------( 290      ( 05 Jul 2022 05:27 )             31.8             07-05    142  |  111<H>  |  7   ----------------------------<  125<H>  4.0   |  24  |  0.83    Ca    8.5      05 Jul 2022 05:27        
INTERVAL HPI/OVERNIGHT EVENTS:  Pt seen and examined at bedside.   Pt resting comfortably. No acute complaints.   Tolerating diet.   Denies N/V    MEDICATIONS  (STANDING):  cefepime   IVPB 2000 milliGRAM(s) IV Intermittent every 8 hours  heparin   Injectable 5000 Unit(s) SubCutaneous every 8 hours  metroNIDAZOLE  IVPB 500 milliGRAM(s) IV Intermittent every 8 hours  pantoprazole  Injectable 40 milliGRAM(s) IV Push daily    MEDICATIONS  (PRN):  morphine  - Injectable 2 milliGRAM(s) IV Push every 4 hours PRN Moderate Pain (4 - 6)  ondansetron Injectable 4 milliGRAM(s) IV Push every 6 hours PRN Nausea and/or Vomiting      Vital Signs Last 24 Hrs  T(C): 36.6 (08 Jul 2022 05:31), Max: 36.7 (07 Jul 2022 14:24)  T(F): 97.8 (08 Jul 2022 05:31), Max: 98.1 (07 Jul 2022 14:24)  HR: 58 (08 Jul 2022 05:31) (58 - 78)  BP: 152/70 (08 Jul 2022 05:31) (148/84 - 166/79)  BP(mean): --  RR: 17 (08 Jul 2022 05:31) (17 - 18)  SpO2: 96% (08 Jul 2022 05:31) (95% - 96%)    Parameters below as of 08 Jul 2022 05:31  Patient On (Oxygen Delivery Method): room air        Physical:  General: A&Ox3. NAD.  Respirations: Unlabored  Abdomen: Soft nondistended, nontender. No rebound, no guarding, no peritonitis     I&O's Detail      LABS:                        10.6   3.82  )-----------( 313      ( 07 Jul 2022 07:11 )             32.2             07-07    142  |  109<H>  |  7   ----------------------------<  109<H>  3.8   |  24  |  0.81    Ca    8.9      07 Jul 2022 07:11        < from: CT Abdomen and Pelvis w/ Oral Cont and w/ IV Cont (07.07.22 @ 17:07) >    ACC: [id number] EXAM:  CT ABDOMEN AND PELVIS OC IC                          PROCEDURE DATE:  07/07/2022          INTERPRETATION:  CLINICAL INFORMATION: 77 years  Female with divertic   abscess eval.    COMPARISON: 7/3/2022    CONTRAST/COMPLICATIONS:  IV Contrast: Omnipaque 350  90 cc administered   10 cc discarded  Oral Contrast: Gastroview  Complications: None reported at time of study completion    Limitations: Motion artifact. Streak artifact from patient's arms.    PROCEDURE:  CT of the Abdomen and Pelvis was performed.  Sagittal and coronal reformats were performed.    FINDINGS:  LOWER CHEST: Steatosis.    LIVER: Within normal limits.  BILE DUCTS: Normal caliber.  GALLBLADDER: Within normal limits.  SPLEEN: Within normal limits.  PANCREAS: Within normal limits.  ADRENALS: Within normal limits.  KIDNEYS/URETERS: Within normal limits.    BLADDER: Within normal limits.  REPRODUCTIVE ORGANS: Hysterectomy.    BOWEL: No bowel obstruction. Appendix is normal.. Diffuse colonic   diverticulosis. Pericolonic fat stranding and fluid improved are from   prior however there is still residual diverticulitis. No drainable   collection.  PERITONEUM: No ascites.  VESSELS: Atherosclerotic changes.  RETROPERITONEUM/LYMPH NODES: No lymphadenopathy.  ABDOMINAL WALL: Small fat-containing umbilical hernia.  BONES: Degenerative changes.    IMPRESSION:  Sigmoid diverticulitis, improved from prior. No drainable collection.        --- End of Report ---            SUSAN NAVARRO MD; Attending Radiologist  This document has been electronically signed. Jul 7 2022  5:46PM    < end of copied text >  
INTERVAL HPI/OVERNIGHT EVENTS:  Pt resting comfortably. No acute complaints.   Feeling much better.  Tolerating clears.  Denies abd pain.    MEDICATIONS  (STANDING):  cefepime   IVPB 2000 milliGRAM(s) IV Intermittent every 8 hours  dextrose 5% + sodium chloride 0.45% with potassium chloride 20 mEq/L 1000 milliLiter(s) (50 mL/Hr) IV Continuous <Continuous>  heparin   Injectable 5000 Unit(s) SubCutaneous every 8 hours  metroNIDAZOLE  IVPB 500 milliGRAM(s) IV Intermittent every 8 hours  pantoprazole  Injectable 40 milliGRAM(s) IV Push daily    MEDICATIONS  (PRN):  morphine  - Injectable 2 milliGRAM(s) IV Push every 4 hours PRN Moderate Pain (4 - 6)  ondansetron Injectable 4 milliGRAM(s) IV Push every 6 hours PRN Nausea and/or Vomiting    Vital Signs Last 24 Hrs  T(C): 36.6 (07 Jul 2022 05:10), Max: 36.8 (06 Jul 2022 20:30)  T(F): 97.8 (07 Jul 2022 05:10), Max: 98.2 (06 Jul 2022 20:30)  HR: 62 (07 Jul 2022 05:10) (62 - 78)  BP: 159/77 (07 Jul 2022 05:10) (131/81 - 159/77)  BP(mean): --  RR: 18 (07 Jul 2022 05:10) (18 - 18)  SpO2: 95% (07 Jul 2022 05:10) (95% - 100%)    Physical:  General: A&Ox3. NAD.  Abdomen: Soft nondistended, nontender.    LABS:                        10.6   3.82  )-----------( 313      ( 07 Jul 2022 07:11 )             32.2             07-07    142  |  109<H>  |  7   ----------------------------<  109<H>  3.8   |  24  |  0.81    Ca    8.9      07 Jul 2022 07:11  
INTERVAL HPI/OVERNIGHT EVENTS:  Pt resting comfortably. No acute complaints.   States pain completely gone.  Tolerating diet.     MEDICATIONS  (STANDING):  cefepime   IVPB 2000 milliGRAM(s) IV Intermittent every 8 hours  heparin   Injectable 5000 Unit(s) SubCutaneous every 8 hours  metroNIDAZOLE  IVPB 500 milliGRAM(s) IV Intermittent every 8 hours  pantoprazole  Injectable 40 milliGRAM(s) IV Push daily    MEDICATIONS  (PRN):  morphine  - Injectable 2 milliGRAM(s) IV Push every 4 hours PRN Moderate Pain (4 - 6)  ondansetron Injectable 4 milliGRAM(s) IV Push every 6 hours PRN Nausea and/or Vomiting      Vital Signs Last 24 Hrs  T(C): 36.3 (09 Jul 2022 06:00), Max: 36.7 (08 Jul 2022 14:08)  T(F): 97.3 (09 Jul 2022 06:00), Max: 98 (08 Jul 2022 14:08)  HR: 69 (09 Jul 2022 06:00) (69 - 98)  BP: 146/75 (09 Jul 2022 06:00) (105/71 - 146/75)  BP(mean): --  RR: 18 (09 Jul 2022 06:00) (17 - 18)  SpO2: 97% (09 Jul 2022 06:00) (97% - 100%)    Parameters below as of 09 Jul 2022 06:00  Patient On (Oxygen Delivery Method): room air    Physical:  General: A&Ox3. NAD.  Abdomen: Soft nondistended, nontender.    LABS:                        11.8   4.29  )-----------( 334      ( 08 Jul 2022 11:03 )             35.1             07-08    141  |  107  |  8   ----------------------------<  116<H>  3.8   |  25  |  0.81    Ca    9.2      08 Jul 2022 11:03    < from: CT Abdomen and Pelvis w/ Oral Cont and w/ IV Cont (07.07.22 @ 17:07) >  IMPRESSION:  Sigmoid diverticulitis, improved from prior. No drainable collection.    < end of copied text >    
Pt states she feels better. She wants to eat.   ICU Vital Signs Last 24 Hrs  T(C): 36.6 (06 Jul 2022 04:40), Max: 36.7 (05 Jul 2022 20:06)  T(F): 97.9 (06 Jul 2022 04:40), Max: 98 (05 Jul 2022 20:06)  HR: 64 (06 Jul 2022 04:40) (64 - 80)  BP: 153/92 (06 Jul 2022 04:40) (138/68 - 153/92)  BP(mean): 112 (06 Jul 2022 04:40) (85 - 112)  ABP: --  ABP(mean): --  RR: 18 (06 Jul 2022 04:40) (16 - 18)  SpO2: 98% (06 Jul 2022 04:40) (96% - 100%)  Alert nad  Abd: soft nt nd                        9.9    5.18  )-----------( 297      ( 06 Jul 2022 06:34 )             29.9   07-06    142  |  111<H>  |  6<L>  ----------------------------<  122<H>  4.2   |  26  |  0.83    Ca    8.5      06 Jul 2022 06:34

## 2022-07-09 NOTE — DISCHARGE NOTE NURSING/CASE MANAGEMENT/SOCIAL WORK - NSDCVIVACCINE_GEN_ALL_CORE_FT
Tdap; 25-Jun-2016 15:39; Mukesh Chaidez (RN); [employer]; [id number]; IntraMuscular; Deltoid Right.; 0.5 milliLiter(s); VIS (VIS Published: 09-May-2013, VIS Presented: 25-Jun-2016);

## 2022-07-09 NOTE — DISCHARGE NOTE NURSING/CASE MANAGEMENT/SOCIAL WORK - PATIENT PORTAL LINK FT
You can access the FollowMyHealth Patient Portal offered by [facility] by registering at the following website: [email or web address]. By joining [facility]’s FollowMyHealth portal, you will also be able to view your health information using other applications (apps) compatible with our system.

## 2022-07-20 ENCOUNTER — APPOINTMENT (OUTPATIENT)
Dept: SURGERY | Facility: CLINIC | Age: 77
End: 2022-07-20

## 2022-07-20 VITALS
BODY MASS INDEX: 29.99 KG/M2 | SYSTOLIC BLOOD PRESSURE: 140 MMHG | WEIGHT: 180 LBS | HEART RATE: 111 BPM | HEIGHT: 65 IN | DIASTOLIC BLOOD PRESSURE: 94 MMHG

## 2022-07-20 VITALS — TEMPERATURE: 97.3 F

## 2022-07-20 VITALS — OXYGEN SATURATION: 97 % | HEART RATE: 70 BPM

## 2022-07-20 DIAGNOSIS — Z82.49 FAMILY HISTORY OF ISCHEMIC HEART DISEASE AND OTHER DISEASES OF THE CIRCULATORY SYSTEM: ICD-10-CM

## 2022-07-20 DIAGNOSIS — Z80.0 FAMILY HISTORY OF MALIGNANT NEOPLASM OF DIGESTIVE ORGANS: ICD-10-CM

## 2022-07-20 DIAGNOSIS — Z84.0 FAMILY HISTORY OF DISEASES OF THE SKIN AND SUBCUTANEOUS TISSUE: ICD-10-CM

## 2022-07-20 DIAGNOSIS — Z98.890 OTHER SPECIFIED POSTPROCEDURAL STATES: ICD-10-CM

## 2022-07-20 PROCEDURE — 99213 OFFICE O/P EST LOW 20 MIN: CPT

## 2022-07-20 NOTE — DATA REVIEWED
[FreeTextEntry1] : ACC: [id number] EXAM: CT ABDOMEN AND PELVIS IC\par \par PROCEDURE DATE: 07/03/2022\par \par \par \par INTERPRETATION: CLINICAL INFORMATION: Abdominal pain.\par \par PROCEDURE:\par Helical axial images were obtained from the domes of the diaphragm through the pubic symphysis following the administration of intravenous contrast. Coronal and sagittal reformats were also obtained.\par \par CONTRAST/COMPLICATIONS:\par IV Contrast: Omnipaque 350 90 cc administered 10 cc discarded\par Oral Contrast: NONE\par Complications: None reported at time of study completion\par \par COMPARISON: 3/10/2022.\par \par FINDINGS:\par \par LOWER CHEST: Atelectasis. Trace pericardial fluid.\par \par LIVER: Subcentimeter hypodense foci, too small to characterize.\par GALLBLADDER/BILE DUCTS: No intrahepatic or extrahepatic biliary dilatation. Slight layering in the gallbladder, which may be due to sludge.\par PANCREAS: Dilated duct.\par SPLEEN: Unremarkable.\par \par ADRENALS: Unremarkable.\par KIDNEYS/URETERS: No hydronephrosis, hydroureter or significant perinephric stranding. Suggest bilateral renal scarring Subcentimeter hypodense focus in the right kidney, too small to characterize.\par BLADDER: Partially distended. Prominent wall.\par REPRODUCTIVE ORGANS: Hysterectomy.\par \par BOWEL: Prominent wall of the visualized distal esophagus and distal stomach. Question small hiatal hernia. Colon diverticulosis. Sigmoid colon wall thickening with peridiverticular inflammatory change. No bowel obstruction. Unremarkable appendix.\par PERITONEUM: Extraluminal air surrounding the sigmoid colon diverticulitis (6:80). Small ascites with peritoneal enhancement in the visualized lower abdomen/pelvis with somewhat loculated, 7.0 x 3.0 x 1.0 cm (transverse x AP x CC) rim-enhancing fluid collection (2:114) between the sigmoid colon and bladder dome. Again noted, stranding in the visualized left upper quadrant.\par VESSELS: Atherosclerosis. Normal caliber of the abdominal aorta.\par RETROPERITONEUM: No lymphadenopathy.\par ABDOMINAL WALL/SOFT TISSUES: Small fat-containing umbilical hernia.\par BONES: Degenerative changes of the spine. Stable grade 1 anterolisthesis of L5 on S1. Stable anterior wedging/endplate depression of the spine.\par \par IMPRESSION:\par \par Perforated sigmoid colon diverticulitis complicated by extraluminal air and fluid collection/developing abscess(es).\par \par Prominent wall of the visualized distal esophagus and distal stomach, which may be due to partial distention and/or esophagitis/gastritis. Recommend correlation with patient's symptoms and endoscopy as indicated.\par \par Prominent bladder wall, which may be reactive given adjacent inflammation and partial distention. Recommend clinical correlation to assess urinary tract infection.\par \par Pancreatic ductal dilatation. Recommend correlation with MRCP/MRI.\par \par Additional findings as described.\par \par Discussed with Dr. Larose.\par \par --- End of Report ---\par ___________________________________________________\par \par \par ACC: [id number] EXAM: CT ABDOMEN AND PELVIS OC IC\par \par PROCEDURE DATE: 07/07/2022\par \par \par \par INTERPRETATION: CLINICAL INFORMATION: 77 years Female with divertic abscess eval.\par \par COMPARISON: 7/3/2022\par \par CONTRAST/COMPLICATIONS:\par IV Contrast: Omnipaque 350 90 cc administered 10 cc discarded\par Oral Contrast: Gastroview\par Complications: None reported at time of study completion\par \par Limitations: Motion artifact. Streak artifact from patient's arms.\par \par PROCEDURE:\par CT of the Abdomen and Pelvis was performed.\par Sagittal and coronal reformats were performed.\par \par FINDINGS:\par LOWER CHEST: Steatosis.\par \par LIVER: Within normal limits.\par BILE DUCTS: Normal caliber.\par GALLBLADDER: Within normal limits.\par SPLEEN: Within normal limits.\par PANCREAS: Within normal limits.\par ADRENALS: Within normal limits.\par KIDNEYS/URETERS: Within normal limits.\par \par BLADDER: Within normal limits.\par REPRODUCTIVE ORGANS: Hysterectomy.\par \par BOWEL: No bowel obstruction. Appendix is normal.. Diffuse colonic diverticulosis. Pericolonic fat stranding and fluid improved are from prior however there is still residual diverticulitis. No drainable collection.\par PERITONEUM: No ascites.\par VESSELS: Atherosclerotic changes.\par RETROPERITONEUM/LYMPH NODES: No lymphadenopathy.\par ABDOMINAL WALL: Small fat-containing umbilical hernia.\par BONES: Degenerative changes.\par \par IMPRESSION:\par Sigmoid diverticulitis, improved from prior. No drainable collection.\par \par \par --- End of Report ---\par \par \par SUSAN NAVARRO MD; Attending Radiologist\par This document has been electronically signed. Jul 7 2022 5:46PM

## 2022-07-20 NOTE — PLAN
[FreeTextEntry1] : Please follow up at the office with Dr Gonzales and as needed for any questions or concerns

## 2022-07-20 NOTE — PHYSICAL EXAM
[Normal Breath Sounds] : Normal breath sounds [Normal Heart Sounds] : normal heart sounds [Normal Rate and Rhythm] : normal rate and rhythm [No Rash or Lesion] : No rash or lesion [Alert] : alert [Oriented to Person] : oriented to person [Oriented to Place] : oriented to place [Oriented to Time] : oriented to time [Calm] : calm [de-identified] : The patient is alert, well-groomed  [de-identified] : Breath sounds equal and bilateral, no wheezing no rales or rhonchi  [de-identified] :  good S1, S2, no murmurs, rubs, gallops bilateral  [de-identified] : Normoactive bowel sounds, soft and nontender, no hepatosplenomegaly or masses noted [de-identified] : full range of motion and no deformities appreciated.

## 2022-07-20 NOTE — ASSESSMENT
[FreeTextEntry1] : NAHED SHPEPARD is a 77 year old female with perf diverticulitis\par \par \par Patient is doing well, denies any discomfort, has normal bowel movements daily. \par \par Results of her recent imaging and physical examination findings were discussed in detail. she was informed of significance of findings. All of the options, risks and benefits were explained. \par \par Patient's questions and concerns addressed to patient's satisfaction. \par \par Patient has a follow up visit with Dr Cerrato, last colonoscopy 3 years ago\par \par Patient will follow up with Dr Gonzales for possible colon resection.  Patient is very hesitant to pursue any surgical intervention at present time

## 2022-07-20 NOTE — HISTORY OF PRESENT ILLNESS
[de-identified] : NAHED SHEPPARD is a 77 year old female who presents in the office for post hospitalization visit. Patient was admitted to [facility] with perf diverticulitis. She underwent a CT of abdomen and pelvis 07/03 which showed Perforated sigmoid colon diverticulitis complicated by extraluminal air and fluid  collection/developing abscess(es). She discharged home on oral antibiotics  cefuroxime 500 mg and metroNIDAZOLE 500 mg, stated that she was forgetting to take metroNIDAZOLE. She completed cefuroxime 500 mg and was not taking Flagyl. Patient had 2 episodes of diverticulitis (March 2022 and July 2022) Patient stated that her last colonoscopy done 3 years ago.

## 2022-07-22 ENCOUNTER — APPOINTMENT (OUTPATIENT)
Dept: GASTROENTEROLOGY | Facility: CLINIC | Age: 77
End: 2022-07-22

## 2022-08-12 ENCOUNTER — APPOINTMENT (OUTPATIENT)
Dept: GASTROENTEROLOGY | Facility: CLINIC | Age: 77
End: 2022-08-12

## 2022-08-12 VITALS
BODY MASS INDEX: 30.66 KG/M2 | SYSTOLIC BLOOD PRESSURE: 153 MMHG | HEART RATE: 70 BPM | RESPIRATION RATE: 16 BRPM | DIASTOLIC BLOOD PRESSURE: 83 MMHG | WEIGHT: 184 LBS | TEMPERATURE: 97.9 F | HEIGHT: 65 IN | OXYGEN SATURATION: 99 %

## 2022-08-12 DIAGNOSIS — K57.20 DIVERTICULITIS OF LARGE INTESTINE WITH PERFORATION AND ABSCESS W/OUT BLEEDING: ICD-10-CM

## 2022-08-12 PROCEDURE — 99204 OFFICE O/P NEW MOD 45 MIN: CPT

## 2022-08-12 NOTE — HISTORY OF PRESENT ILLNESS
[de-identified] : 77F with pmhx of HTN, recurrent diverticuliits, recent admission for complicated diverticulitis, perforated with abscess managed conservatively with abx presenting for evaluation. Pt reports feeling well today. Notes back and occasional R flank pain which is chronic, unchanged. Denies any new abd pain, n/v/d/c, melena, hematochezia, fever/chills, or other issues. Last colonocsopy she reports was 3 yrs ago which was unremarkable. \par \par PMHx: Above.\par Medications: See chart.\par Allergies: NKDA.\par Surgical Hx: Hysterectomy, breast surgery, carpal tunnel surgery.\par SH: Denies tobacco, etoh, or drug use. \par FH: Sister with colon cancer, dx 40s.

## 2022-08-12 NOTE — PHYSICAL EXAM
[General Appearance - Alert] : alert [General Appearance - In No Acute Distress] : in no acute distress [Sclera] : the sclera and conjunctiva were normal [PERRL With Normal Accommodation] : pupils were equal in size, round, and reactive to light [Extraocular Movements] : extraocular movements were intact [Outer Ear] : the ears and nose were normal in appearance [Oropharynx] : the oropharynx was normal [Neck Appearance] : the appearance of the neck was normal [Neck Cervical Mass (___cm)] : no neck mass was observed [Jugular Venous Distention Increased] : there was no jugular-venous distention [Auscultation Breath Sounds / Voice Sounds] : lungs were clear to auscultation bilaterally [Heart Sounds] : normal S1 and S2 [Heart Rate And Rhythm] : heart rate was normal and rhythm regular [Heart Sounds Gallop] : no gallops [Murmurs] : no murmurs [Heart Sounds Pericardial Friction Rub] : no pericardial rub [Edema] : there was no peripheral edema [Bowel Sounds] : normal bowel sounds [Abdomen Soft] : soft [Abdomen Tenderness] : non-tender [] : no hepato-splenomegaly [Abdomen Mass (___ Cm)] : no abdominal mass palpated [Abnormal Walk] : normal gait [Skin Color & Pigmentation] : normal skin color and pigmentation [No Focal Deficits] : no focal deficits [Oriented To Time, Place, And Person] : oriented to person, place, and time

## 2022-08-12 NOTE — ASSESSMENT
[FreeTextEntry1] : 77F with pmhx of HTN, recurrent diverticuliits, recent admission for complicated diverticulitis, perforated with abscess managed conservatively with abx presenting for evaluation. Pt reports feeling well today. Notes back and occasional R flank pain which is chronic, unchanged. Denies any new abd pain, n/v/d/c, melena, hematochezia, fever/chills, or other issues. Last colonocsopy she reports was 3 yrs ago which was unremarkable. \par - MRI abdomen to evaluate pancreatic ductal dilation and resolution of abscess.\par - RTC 4-6 weeks after MRI and will schedule colonoscopy.

## 2022-08-16 LAB
ANION GAP SERPL CALC-SCNC: 9 MMOL/L
BUN SERPL-MCNC: 14 MG/DL
CALCIUM SERPL-MCNC: 9.8 MG/DL
CHLORIDE SERPL-SCNC: 108 MMOL/L
CO2 SERPL-SCNC: 29 MMOL/L
CREAT SERPL-MCNC: 0.83 MG/DL
EGFR: 73 ML/MIN/1.73M2
GLUCOSE SERPL-MCNC: 92 MG/DL
POTASSIUM SERPL-SCNC: 4.5 MMOL/L
SODIUM SERPL-SCNC: 146 MMOL/L

## 2022-08-24 DIAGNOSIS — K86.89 OTHER SPECIFIED DISEASES OF PANCREAS: ICD-10-CM

## 2022-09-02 ENCOUNTER — OUTPATIENT (OUTPATIENT)
Dept: OUTPATIENT SERVICES | Facility: HOSPITAL | Age: 77
LOS: 1 days | End: 2022-09-02
Payer: MEDICARE

## 2022-09-02 ENCOUNTER — APPOINTMENT (OUTPATIENT)
Dept: CT IMAGING | Facility: HOSPITAL | Age: 77
End: 2022-09-02

## 2022-09-02 DIAGNOSIS — K86.89 OTHER SPECIFIED DISEASES OF PANCREAS: ICD-10-CM

## 2022-09-02 PROCEDURE — 74177 CT ABD & PELVIS W/CONTRAST: CPT | Mod: 26

## 2022-09-02 PROCEDURE — 74177 CT ABD & PELVIS W/CONTRAST: CPT

## 2022-09-26 ENCOUNTER — APPOINTMENT (OUTPATIENT)
Dept: GASTROENTEROLOGY | Facility: CLINIC | Age: 77
End: 2022-09-26

## 2022-09-26 VITALS
TEMPERATURE: 97.9 F | HEART RATE: 77 BPM | BODY MASS INDEX: 28.66 KG/M2 | WEIGHT: 172 LBS | DIASTOLIC BLOOD PRESSURE: 87 MMHG | HEIGHT: 65 IN | RESPIRATION RATE: 16 BRPM | OXYGEN SATURATION: 96 % | SYSTOLIC BLOOD PRESSURE: 155 MMHG

## 2022-09-26 PROCEDURE — 99213 OFFICE O/P EST LOW 20 MIN: CPT

## 2022-09-26 NOTE — HISTORY OF PRESENT ILLNESS
[FreeTextEntry1] : 77F with pmhx of HTN, recurrent diverticuliits, recent admission for complicated diverticulitis, perforated with abscess managed conservatively with abx presenting for follow-up. Pt reports feeling well today. Notes back and occasional R flank pain which is chronic, unchanged. Denies any new abd pain, n/v/d/c, melena, hematochezia, fever/chills, or other issues. Last colonocsopy she reports was 3 yrs ago which was unremarkable. Since last visit, had CT showing resolution of abscess and persistent pancreatic ductal dilation.

## 2022-09-26 NOTE — ASSESSMENT
[FreeTextEntry1] : 77F with pmhx of HTN, recurrent diverticuliits, recent admission for complicated diverticulitis, perforated with abscess managed conservatively with abx presenting for follow-up. Pt reports feeling well today. Notes back and occasional R flank pain which is chronic, unchanged. Denies any new abd pain, n/v/d/c, melena, hematochezia, fever/chills, or other issues. Last colonocsopy she reports was 3 yrs ago which was unremarkable. Since last visit, had CT showing resolution of abscess and persistent pancreatic ductal dilation. Recommended for MRI however pt denied MRI x2.\par - Schedule EGD/EUS to evaluate pancreatic ductal dilation, possible FNB if lesion present.\par - COVID testing prior.\par - RTC post EUS.

## 2022-09-26 NOTE — PHYSICAL EXAM
[Alert] : alert [Normal Voice/Communication] : normal voice/communication [Healthy Appearing] : healthy appearing [No Acute Distress] : no acute distress [Sclera] : the sclera and conjunctiva were normal [Hearing Threshold Finger Rub Not [place]] : hearing was normal [Normal Lips/Gums] : the lips and gums were normal [Oropharynx] : the oropharynx was normal [Normal Appearance] : the appearance of the neck was normal [No Neck Mass] : no neck mass was observed [No Respiratory Distress] : no respiratory distress [No Acc Muscle Use] : no accessory muscle use [Respiration, Rhythm And Depth] : normal respiratory rhythm and effort [Auscultation Breath Sounds / Voice Sounds] : lungs were clear to auscultation bilaterally [Heart Rate And Rhythm] : heart rate was normal and rhythm regular [Normal S1, S2] : normal S1 and S2 [Murmurs] : no murmurs [Bowel Sounds] : normal bowel sounds [Abdomen Tenderness] : non-tender [No Masses] : no abdominal mass palpated [Abdomen Soft] : soft [] : no hepatosplenomegaly [Oriented To Time, Place, And Person] : oriented to person, place, and time

## 2022-10-17 VITALS
SYSTOLIC BLOOD PRESSURE: 178 MMHG | HEIGHT: 63 IN | OXYGEN SATURATION: 99 % | DIASTOLIC BLOOD PRESSURE: 82 MMHG | HEART RATE: 67 BPM | TEMPERATURE: 98 F | WEIGHT: 179.02 LBS | RESPIRATION RATE: 18 BRPM

## 2022-10-17 NOTE — ASU PATIENT PROFILE, ADULT - FALL HARM RISK - UNIVERSAL INTERVENTIONS
Bed in lowest position, wheels locked, appropriate side rails in place/Call bell, personal items and telephone in reach/Instruct patient to call for assistance before getting out of bed or chair/Non-slip footwear when patient is out of bed/[place]oomer to call system/Physically safe environment - no spills, clutter or unnecessary equipment/Purposeful Proactive Rounding/Room/bathroom lighting operational, light cord in reach

## 2022-10-18 ENCOUNTER — OUTPATIENT (OUTPATIENT)
Dept: OUTPATIENT SERVICES | Facility: HOSPITAL | Age: 77
LOS: 1 days | End: 2022-10-18
Payer: MEDICARE

## 2022-10-18 ENCOUNTER — TRANSCRIPTION ENCOUNTER (OUTPATIENT)
Age: 77
End: 2022-10-18

## 2022-10-18 ENCOUNTER — APPOINTMENT (OUTPATIENT)
Dept: GASTROENTEROLOGY | Facility: HOSPITAL | Age: 77
End: 2022-10-18

## 2022-10-18 ENCOUNTER — RESULT REVIEW (OUTPATIENT)
Age: 77
End: 2022-10-18

## 2022-10-18 VITALS
RESPIRATION RATE: 19 BRPM | OXYGEN SATURATION: 99 % | HEART RATE: 64 BPM | SYSTOLIC BLOOD PRESSURE: 157 MMHG | DIASTOLIC BLOOD PRESSURE: 74 MMHG

## 2022-10-18 DIAGNOSIS — K57.32 DIVERTICULITIS OF LARGE INTESTINE WITHOUT PERFORATION OR ABSCESS WITHOUT BLEEDING: ICD-10-CM

## 2022-10-18 LAB — SARS-COV-2 N GENE NPH QL NAA+PROBE: NOT DETECTED

## 2022-10-18 PROCEDURE — 88305 TISSUE EXAM BY PATHOLOGIST: CPT

## 2022-10-18 PROCEDURE — 88305 TISSUE EXAM BY PATHOLOGIST: CPT | Mod: 26

## 2022-10-18 PROCEDURE — 43259 EGD US EXAM DUODENUM/JEJUNUM: CPT

## 2022-10-18 PROCEDURE — 88312 SPECIAL STAINS GROUP 1: CPT | Mod: 26

## 2022-10-18 PROCEDURE — 43239 EGD BIOPSY SINGLE/MULTIPLE: CPT

## 2022-10-18 PROCEDURE — 88312 SPECIAL STAINS GROUP 1: CPT

## 2022-10-18 RX ORDER — BNT162B2 ORIGINAL AND OMICRON BA.4/BA.5 .1125; .1125 MG/2.25ML; MG/2.25ML
0.3 INJECTION, SUSPENSION INTRAMUSCULAR ONCE
Refills: 0 | Status: DISCONTINUED | OUTPATIENT
Start: 2022-10-18 | End: 2022-10-18

## 2022-10-18 NOTE — ASU DISCHARGE PLAN (ADULT/PEDIATRIC) - NS MD DC FALL RISK RISK
For information on Fall & Injury Prevention, visit: [email or web address] OR  [email or web address] OR  https://www.cdc.gov/steadi/patient.html

## 2022-10-20 LAB — SURGICAL PATHOLOGY STUDY: SIGNIFICANT CHANGE UP

## 2022-10-25 NOTE — ED ADULT NURSE NOTE - PRIMARY CARE PROVIDER
oriented to person, place and time , normal sensation , short and long term memory intact
Performed
Dr. Madden

## 2022-12-27 ENCOUNTER — EMERGENCY (EMERGENCY)
Facility: HOSPITAL | Age: 77
LOS: 1 days | Discharge: HOME | End: 2022-12-27
Attending: EMERGENCY MEDICINE
Payer: MEDICARE

## 2022-12-27 VITALS
OXYGEN SATURATION: 98 % | DIASTOLIC BLOOD PRESSURE: 89 MMHG | HEART RATE: 76 BPM | TEMPERATURE: 98 F | WEIGHT: 169.98 LBS | SYSTOLIC BLOOD PRESSURE: 168 MMHG | RESPIRATION RATE: 18 BRPM

## 2022-12-27 VITALS
DIASTOLIC BLOOD PRESSURE: 88 MMHG | HEART RATE: 66 BPM | OXYGEN SATURATION: 97 % | RESPIRATION RATE: 18 BRPM | SYSTOLIC BLOOD PRESSURE: 150 MMHG | TEMPERATURE: 98 F

## 2022-12-27 LAB
ALBUMIN SERPL ELPH-MCNC: 3.9 G/DL — SIGNIFICANT CHANGE UP (ref 3.5–5)
ALP SERPL-CCNC: 64 U/L — SIGNIFICANT CHANGE UP (ref 40–120)
ALT FLD-CCNC: 19 U/L DA — SIGNIFICANT CHANGE UP (ref 10–60)
ANION GAP SERPL CALC-SCNC: 6 MMOL/L — SIGNIFICANT CHANGE UP (ref 5–17)
AST SERPL-CCNC: 13 U/L — SIGNIFICANT CHANGE UP (ref 10–40)
BASOPHILS # BLD AUTO: 0.03 K/UL — SIGNIFICANT CHANGE UP (ref 0–0.2)
BASOPHILS NFR BLD AUTO: 0.5 % — SIGNIFICANT CHANGE UP (ref 0–2)
BILIRUB SERPL-MCNC: 0.4 MG/DL — SIGNIFICANT CHANGE UP (ref 0.2–1.2)
BUN SERPL-MCNC: 15 MG/DL — SIGNIFICANT CHANGE UP (ref 7–18)
CALCIUM SERPL-MCNC: 9.4 MG/DL — SIGNIFICANT CHANGE UP (ref 8.4–10.5)
CHLORIDE SERPL-SCNC: 108 MMOL/L — SIGNIFICANT CHANGE UP (ref 96–108)
CO2 SERPL-SCNC: 29 MMOL/L — SIGNIFICANT CHANGE UP (ref 22–31)
CREAT SERPL-MCNC: 0.91 MG/DL — SIGNIFICANT CHANGE UP (ref 0.5–1.3)
EGFR: 65 ML/MIN/1.73M2 — SIGNIFICANT CHANGE UP
EOSINOPHIL # BLD AUTO: 0.13 K/UL — SIGNIFICANT CHANGE UP (ref 0–0.5)
EOSINOPHIL NFR BLD AUTO: 2 % — SIGNIFICANT CHANGE UP (ref 0–6)
FLUAV AG NPH QL: SIGNIFICANT CHANGE UP
FLUBV AG NPH QL: SIGNIFICANT CHANGE UP
GLUCOSE SERPL-MCNC: 96 MG/DL — SIGNIFICANT CHANGE UP (ref 70–99)
HCT VFR BLD CALC: 38.5 % — SIGNIFICANT CHANGE UP (ref 34.5–45)
HGB BLD-MCNC: 12.6 G/DL — SIGNIFICANT CHANGE UP (ref 11.5–15.5)
HIV 1 & 2 AB SERPL IA.RAPID: SIGNIFICANT CHANGE UP
IMM GRANULOCYTES NFR BLD AUTO: 0.2 % — SIGNIFICANT CHANGE UP (ref 0–0.9)
LYMPHOCYTES # BLD AUTO: 0.88 K/UL — LOW (ref 1–3.3)
LYMPHOCYTES # BLD AUTO: 13.8 % — SIGNIFICANT CHANGE UP (ref 13–44)
MCHC RBC-ENTMCNC: 30.1 PG — SIGNIFICANT CHANGE UP (ref 27–34)
MCHC RBC-ENTMCNC: 32.7 GM/DL — SIGNIFICANT CHANGE UP (ref 32–36)
MCV RBC AUTO: 91.9 FL — SIGNIFICANT CHANGE UP (ref 80–100)
MONOCYTES # BLD AUTO: 0.48 K/UL — SIGNIFICANT CHANGE UP (ref 0–0.9)
MONOCYTES NFR BLD AUTO: 7.5 % — SIGNIFICANT CHANGE UP (ref 2–14)
NEUTROPHILS # BLD AUTO: 4.83 K/UL — SIGNIFICANT CHANGE UP (ref 1.8–7.4)
NEUTROPHILS NFR BLD AUTO: 76 % — SIGNIFICANT CHANGE UP (ref 43–77)
NRBC # BLD: 0 /100 WBCS — SIGNIFICANT CHANGE UP (ref 0–0)
PLATELET # BLD AUTO: 327 K/UL — SIGNIFICANT CHANGE UP (ref 150–400)
POTASSIUM SERPL-MCNC: 4.1 MMOL/L — SIGNIFICANT CHANGE UP (ref 3.5–5.3)
POTASSIUM SERPL-SCNC: 4.1 MMOL/L — SIGNIFICANT CHANGE UP (ref 3.5–5.3)
PROT SERPL-MCNC: 7.8 G/DL — SIGNIFICANT CHANGE UP (ref 6–8.3)
RBC # BLD: 4.19 M/UL — SIGNIFICANT CHANGE UP (ref 3.8–5.2)
RBC # FLD: 14 % — SIGNIFICANT CHANGE UP (ref 10.3–14.5)
SARS-COV-2 RNA SPEC QL NAA+PROBE: SIGNIFICANT CHANGE UP
SODIUM SERPL-SCNC: 143 MMOL/L — SIGNIFICANT CHANGE UP (ref 135–145)
TROPONIN I, HIGH SENSITIVITY RESULT: 8.7 NG/L — SIGNIFICANT CHANGE UP
TROPONIN I, HIGH SENSITIVITY RESULT: 8.8 NG/L — SIGNIFICANT CHANGE UP
WBC # BLD: 6.36 K/UL — SIGNIFICANT CHANGE UP (ref 3.8–10.5)
WBC # FLD AUTO: 6.36 K/UL — SIGNIFICANT CHANGE UP (ref 3.8–10.5)

## 2022-12-27 PROCEDURE — 84484 ASSAY OF TROPONIN QUANT: CPT

## 2022-12-27 PROCEDURE — 80053 COMPREHEN METABOLIC PANEL: CPT

## 2022-12-27 PROCEDURE — 86703 HIV-1/HIV-2 1 RESULT ANTBDY: CPT

## 2022-12-27 PROCEDURE — 94640 AIRWAY INHALATION TREATMENT: CPT

## 2022-12-27 PROCEDURE — 74177 CT ABD & PELVIS W/CONTRAST: CPT | Mod: 26,MA

## 2022-12-27 PROCEDURE — 87637 SARSCOV2&INF A&B&RSV AMP PRB: CPT

## 2022-12-27 PROCEDURE — 99285 EMERGENCY DEPT VISIT HI MDM: CPT | Mod: 25

## 2022-12-27 PROCEDURE — 71045 X-RAY EXAM CHEST 1 VIEW: CPT | Mod: 26

## 2022-12-27 PROCEDURE — 99285 EMERGENCY DEPT VISIT HI MDM: CPT

## 2022-12-27 PROCEDURE — 85025 COMPLETE CBC W/AUTO DIFF WBC: CPT

## 2022-12-27 PROCEDURE — 71045 X-RAY EXAM CHEST 1 VIEW: CPT

## 2022-12-27 PROCEDURE — 74177 CT ABD & PELVIS W/CONTRAST: CPT | Mod: MA

## 2022-12-27 PROCEDURE — 93005 ELECTROCARDIOGRAM TRACING: CPT

## 2022-12-27 PROCEDURE — 36415 COLL VENOUS BLD VENIPUNCTURE: CPT

## 2022-12-27 RX ORDER — CIPROFLOXACIN LACTATE 400MG/40ML
1 VIAL (ML) INTRAVENOUS
Qty: 10 | Refills: 0
Start: 2022-12-27 | End: 2022-12-31

## 2022-12-27 RX ORDER — SODIUM CHLORIDE 9 MG/ML
1000 INJECTION INTRAMUSCULAR; INTRAVENOUS; SUBCUTANEOUS ONCE
Refills: 0 | Status: COMPLETED | OUTPATIENT
Start: 2022-12-27 | End: 2022-12-27

## 2022-12-27 RX ORDER — METRONIDAZOLE 500 MG
1 TABLET ORAL
Qty: 15 | Refills: 0
Start: 2022-12-27 | End: 2022-12-31

## 2022-12-27 RX ORDER — ASPIRIN/CALCIUM CARB/MAGNESIUM 324 MG
162 TABLET ORAL ONCE
Refills: 0 | Status: COMPLETED | OUTPATIENT
Start: 2022-12-27 | End: 2022-12-27

## 2022-12-27 RX ORDER — METRONIDAZOLE 500 MG
500 TABLET ORAL ONCE
Refills: 0 | Status: COMPLETED | OUTPATIENT
Start: 2022-12-27 | End: 2022-12-27

## 2022-12-27 RX ORDER — NITROGLYCERIN 6.5 MG
0.4 CAPSULE, EXTENDED RELEASE ORAL
Refills: 0 | Status: DISCONTINUED | OUTPATIENT
Start: 2022-12-27 | End: 2022-12-30

## 2022-12-27 RX ORDER — IPRATROPIUM/ALBUTEROL SULFATE 18-103MCG
3 AEROSOL WITH ADAPTER (GRAM) INHALATION ONCE
Refills: 0 | Status: COMPLETED | OUTPATIENT
Start: 2022-12-27 | End: 2022-12-27

## 2022-12-27 RX ORDER — CIPROFLOXACIN LACTATE 400MG/40ML
500 VIAL (ML) INTRAVENOUS ONCE
Refills: 0 | Status: COMPLETED | OUTPATIENT
Start: 2022-12-27 | End: 2022-12-27

## 2022-12-27 RX ADMIN — SODIUM CHLORIDE 1000 MILLILITER(S): 9 INJECTION INTRAMUSCULAR; INTRAVENOUS; SUBCUTANEOUS at 20:26

## 2022-12-27 RX ADMIN — Medication 3 MILLILITER(S): at 13:30

## 2022-12-27 RX ADMIN — Medication 162 MILLIGRAM(S): at 15:19

## 2022-12-27 RX ADMIN — Medication 500 MILLIGRAM(S): at 21:32

## 2022-12-27 NOTE — ED ADULT NURSE NOTE - OBJECTIVE STATEMENT
Pt arrived to ED c/o chest pressure x few days , denies dizziness , diaphoresis, nausea, vomiting or shortness of breath   Stated had cold like symptoms prior to having chest pain

## 2022-12-27 NOTE — ED PROVIDER NOTE - CLINICAL SUMMARY MEDICAL DECISION MAKING FREE TEXT BOX
chest pain after viral syndrome, resolved in ED, will check 2 trops to [facility]  cxr ro RO PNA, Pneuthorax,   unlikely PE given low-no wells score   fu with cardiologist   return if worsening cp

## 2022-12-27 NOTE — PHARMACOTHERAPY INTERVENTION NOTE - COMMENTS
Patient's home pharmacy ([facility]:[id number] [place] [address] [phone]) on record was contacted and the Outside Medication Record was updated based on the pharmacy prescription history.    Per pharmacy, patient has a prescription for naproxen 500 mg 1 tablet by mouth twice a day with meals for 14 days, not picked up since December 16, 2022.

## 2022-12-27 NOTE — ED PROVIDER NOTE - NS ED ROS FT
Pt denies fevers, chills  nausea, vomiting,   , palpitations  shortness of breath, orthopnea  abdominal pain, melena,   dysuria, hematuria   numbness, weakness, saddle anesthesia  rash  enlarged lymph nodes

## 2022-12-27 NOTE — ED PROVIDER NOTE - NSFOLLOWUPINSTRUCTIONS_ED_ALL_ED_FT
1 See a cardiologist within 4-6 days to follow up your chest pain  2 Return if you have recurrence of your chest pain   Chest Pain    Chest pain can be caused by many different conditions which may or may not be dangerous. Causes include heartburn, lung infections, heart attack, blood clot in lungs, skin infections, strain or damage to muscle, cartilage, or bones, etc. In addition to a history and physical examination, an electrocardiogram (ECG) or other lab tests may have been performed to determine the cause of your chest pain. Follow up with your primary care provider or with a cardiologist as instructed.     SEEK IMMEDIATE MEDICAL CARE IF YOU HAVE ANY OF THE FOLLOWING SYMPTOMS: worsening chest pain, coughing up blood, unexplained back/neck/jaw pain, severe abdominal pain, dizziness or lightheadedness, fainting, shortness of breath, sweaty or clammy skin, vomiting, or racing heart beat. These symptoms may represent a serious problem that is an emergency. Do not wait to see if the symptoms will go away. Get medical help right away. Call 911 and do not drive yourself to the hospital. You were seen in the emergency department for chest pain.  Found to have diverticulitis.    Please follow-up with your primary care doctor in the next 24-48 hours.    Please finish all of your antibiotics.    You have any worsening symptoms, severe headache, chest pain, trouble breathing, abdominal pain, please return to the emergency department.    1 See a cardiologist within 4-6 days to follow up your chest pain  2 Return if you have recurrence of your chest pain   Chest Pain    Chest pain can be caused by many different conditions which may or may not be dangerous. Causes include heartburn, lung infections, heart attack, blood clot in lungs, skin infections, strain or damage to muscle, cartilage, or bones, etc. In addition to a history and physical examination, an electrocardiogram (ECG) or other lab tests may have been performed to determine the cause of your chest pain. Follow up with your primary care provider or with a cardiologist as instructed.     SEEK IMMEDIATE MEDICAL CARE IF YOU HAVE ANY OF THE FOLLOWING SYMPTOMS: worsening chest pain, coughing up blood, unexplained back/neck/jaw pain, severe abdominal pain, dizziness or lightheadedness, fainting, shortness of breath, sweaty or clammy skin, vomiting, or racing heart beat. These symptoms may represent a serious problem that is an emergency. Do not wait to see if the symptoms will go away. Get medical help right away. Call 911 and do not drive yourself to the hospital.

## 2022-12-27 NOTE — ED PROVIDER NOTE - PHYSICAL EXAMINATION
Normal for race
General: mildly dehydrated, no acute distress, appears stated age  HEENT: normocephalic, atraumatic   Respiratory: normal work of breathing  MSK: no swelling or tenderness of lower extremities, moving all extremities spontaneously   Skin: warm, dry  Neuro: A&Ox3, cranial nerves II-XII intact, 5/5 strength in all extremities, no sensory deficits, normal gait   Psych: appropriate affect  ABD: soft, nontender, nondistended, no guarding, no rebound, no CVA tenderness

## 2022-12-27 NOTE — ED PROVIDER NOTE - OBJECTIVE STATEMENT
77-year-old female presents with chest tightness that started today after 4 days of chest congestion runny nose and sore throat. She denies shortness of breath, chest pain radiating into the arms or back, nausea diaphoresis lightheadedness.

## 2022-12-27 NOTE — ED PROVIDER NOTE - PATIENT PORTAL LINK FT
You can access the FollowMyHealth Patient Portal offered by [facility] by registering at the following website: [email or web address]. By joining [employer]’s FollowMyHealth portal, you will also be able to view your health information using other applications (apps) compatible with our system. You can access the FollowMyHealth Patient Portal offered by [facility] by registering at the following website: [email or web address]. By joining [employer]’s FollowMyHealth portal, you will also be able to view your health information using other applications (apps) compatible with our system.

## 2022-12-27 NOTE — ED PROVIDER NOTE - WR ORDER ID 1
Chief Complaint   Patient presents with   • Office Visit   • Back Pain     Date of Injury: 2022  Initial Treatment Date: 2022  Date Last Seen: new  Mechanism of Onset: Work comp  Occupation: [employer]  Referred by: Tc Mccarthy DC  Primary Care Physician: Rogers Casarez MD  Date informed consent signed: 2022  I have reviewed the past medical history, family history, social history, medications and allergies listed in the medical record as obtained by my nursing staff and support staff and agree with their documentation.  Viki  reports that she quit smoking about 13 months ago. She smoked 0.50 packs per day. She has never used smokeless tobacco.  Viki has No Known Allergies.  Visit Number of Current Episode: 1  Initial Pain Ratin/10  Initial PROM 70 %    COVID-19 Screening:    • Does the patient OR patient’s household members have any of the following symptoms?  o Temperature: Fever ?100.0°F or ?37.8°C?  No  o Respiratory symptoms: New or worsening cough, shortness of breath, or sore throat? No  o GI symptoms: New onset of nausea, vomiting or diarrhea?  No  o Miscellaneous: New onset of loss of taste or smell, chills, repeated shaking with chills, muscle pain or headache?  No  • Has the patient or a household member tested positive for COVID-19 in the last 14 days?  No  • Has the patient or a household member been tested for COVID-19 and are waiting for the results?  No      CHIROPRACTIC PATIENT HISTORY:   How often do you experience your symptoms?  -51-75% of the day    On roughly what date did your present pain start: 2022     Have you had similar pain in the past? no     Over time are your symptoms: No change with time     Rate your symptoms: 0 being no pain and 10 unbearable at their:  Worst 8  Best 6    Are you still working? Yes    How much does your job require you to lift? 50 lbs.    Are you totally disabled from work? No     Are you on work restrictions? Yes      How did your pain start?  Suddenly    When does your pain feel worse? Bending forward, Bending back, Sitting, Standing    If you have had a chiropractic adjustment or manipulation before, what date was your appointment? Years ago  How did it affect your symptoms? improved    What reduces the pain?  Lying down    Indicate any of the following diagnostic studies that you have had in the last 3 years for your complaints. none    List any hospitalizations, surgeries, or serious injuries beginning with those that concern your current problem. none    Medications currently taking:   Aspirin/anti-inflammatories, Antidepressants    What allergies do you have? None known    Indicate any conditions you may have had. Migraines; asthma    Do you have to sleep in a certain position to help your pain? It's difficult to find a comfortable position    On average how many times per week do you exercise for at least 20 minutes? 3  What type of exercise do you perform? stretching    Do you smoke or use any tobacco products? No    Do you have any diet restrictions: no    Do you drink alcoholic beverages? No    Number of caffeine beverages that you drink per day: 1    What other health providers have you seen for your chief complaints? Dr. Gaytan    HEALTH HISTORY:    Allergic rhinitis                                             Irritable bowel syndrome (IBS)                                Asthma                                                        SOCIAL HISTORY:  Patient completed Chiropractic Patient History and Chiropractic Systems Review.  These were reviewed with the patient.    SUBJECTIVE:  Iraida' is a pleasant 23 year old female that presents to our office today for an evaluation and treatment of back pain. Patient rates her pain today at 8/10 with 10 being worst.  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 She states she felt back pain and went on with her day repeating  this process for 12 sets of 3 rolls that day. The next morning at work it was a lot worse and she had to sit down a lot. Along with mid back pain she was getting a zinger pain down both legs at random into the feet. She states last night she couldn't sleep and tossed and turned most of the night. Her pain is mid back right next to the spine and is going up into the left shoulder and neck now.      Relevant Diagnostic Imaging:   X-ray (R shoulder) 08/30/2021    OBJECTIVE FINDINGS:   Patient Emotional screening was completed 04/18/2022; DoD/VA Pain Supplemental Questionnaire score was 28/40.    During the past 24 hours, how has pain interfered with normal activities: 8/10  During the past 24 hours, how has pain interfered with your sleep: 8/10  During the past 24 hours, how has pain affected your mood: 6/10  During the past 24 hours, how has pain contributed to your stress: 6/10    Patient Recorded Objective Measure is 70 %    Problem Focused Examination revealed:    (Thoracic Spine) Thoracic spine facet joint function is within normal limits except for her T 5/6/7 facet joints that exhibited limited passive range of motion and segmental restriction and tenderness upon palpation. The following muscles were examined for normal flexibility and tone; right rhomboid, left rhomboid, right serratus, left serratus, right latissimus dorsi, and left latissimus dorsi. These muscles were within normal limits except for her right serratus muscle(s) that exhibited limited flexibility and abnormal resting tone.    (Shoulder) Her range of motion is within normal limits. The following muscles were examined for normal flexibility and tone: right supraspinatus, left supraspinatus, right subscapularis, left subscapularis, right bicep, left bicep, right teres, left teres, and left and right infraspinatus muscles. These muscles were found to be within normal limits except for her left pectoralis major muscle, left infraspinatus muscle,  left and right subscapularis muscle, left supraspinatus muscle that exhibited limited flexibility and were hypertonic at rest.    (Lumbar, sacral and pelvic spine) Lumbar spine facet joint function is within normal limits; sacroiliac joint function is within normal limits except for her right sacroiliac joint that exhibited limited passive range of motion and joint restriction; The following muscles were examined for flexibility and tone at rest; right hip flexor, left hip flexor, right hip rotator, left hip rotator, right piriformis, left piriformis, right hamstring, left hamstring, right lumbar paraspinals, left lumbar paraspinals, right gluteus medius, left gluteus medius, right gluteus matilda, left gluteus matilda, right quadratus lumborum, left quadratus lumborum, right tensor fasciae latae, left tensor fasciae latae, right internal hip rotators, left internal hip rotators, right quadriceps, left quadriceps, right iliotibial band, left iliotibial band, and gluteus minimus muscles. These muscles were found to be within normal limits except for her left tensor fasciae latae muscle(s), right gluteus matilda muscle, right piriformis muscle that exhibited limited flexibility and were hypertonic at rest.    (Hip) Range of motion is within normal limits     Orthopedic/Neurological tests:    There were no vitals taken for this visit.  She is mesomorphic in constitution. She is appropriately attired and of pleasant demeanor. Level of distress is moderate. Peripheral pulses are strong. There is no apparent vascular deficit. Head is normocephalic. There is a lack of numbness in the upper and lower extremities. Orientation is x3. Gait and station are within normal limits. Her left shoulder passive and active range of motion is limited. Heel walk and toe walk are within normal limits and do not exhibit any reduction in strength.   (Neck) maximum foraminal compression provokes neck pain but no radicular arm or shoulder  pain; neck flexion provokes neck pain; neck extension does not provoke neck pain; cervical distraction decreases her upper back and neck pain; shoulder depressor is negative for for nerve root tension; tenderness to palpation is noted over left pec major, infraspinatus and supraspinatus muscles and her right and left subscapularis muscles; upper extremity deep tendon reflexes are within normal limits; upper extremity dermatomal sensation is within normal limits  (Shoulder): Lift-off/Belly Press test for subscapularis tear is ngative; Drop sign for supraspinatus tear is negative; Neer sign for impingement provokes left shoulder pain; Speed’s test for biceps tendonitis is negative; Sulcus sign for shoulder laxity is negative; Crank test for superior labrum anterior and posterior lesion is negative  (Lower back) Yeoman’s test provokes right sided low back pain; straight leg raise is negative for radicular leg pain; Barron’s test provokes right sided low back pain; Heel to buttock provokes midline lower back pain; Fransico test is  Negative for hip flexor contracture; tenderness to palpation is noted over her left TFL and right gluteal muscles; lower extremity deep tendon reflexes are within normal limits; lower extremity dermatomal sensation is within normal limits; slump test provokes lower back pain. Sacral apex test is positive for asymmetry  (Hip) Daryl’s DEB test is negative for hip pain; FADIR test Log roll test is negative for hip pain.    Directional Preference:   None noted today.    Assessment:   1. Pain in thoracic spine    2. Cervicalgia    3. Thoracic region somatic dysfunction    4. Pelvic somatic dysfunction    5. Acute bilateral low back pain without sciatica    6. Acute pain of left shoulder    7. Upper extremity somatic dysfunction        Complicating Factors/Comorbidities:   None noted    PLAN:  Patient was evaluated and then treated with manipulation to her left shoulder and her pelvic spine via  diversified manipulation technique and to her thoracic spine via anterior manipulation technique to improve function and passive range of motion of facet joints.  Patient also treated with contract/relax stretch to muscle noted as taut in objective findings to improve flexibility and decrease strain to spinal structures.     Therapeutic Exercise/Rehab/Modalities performed today:   none    Patient Instruction/Education: none.     Patient stated understanding of, and was in agreement with, the discussed instructions.  Goals of Care: Goal of care is to improve muscular and skeletal function and provide symptom relief.   Additional Goals Include and to be obtained by end of this plan of care.   To be obtained by end of this plan of care:  1. Patient independent with modified and progressed home exercise program.  2. Patient will decrease symptoms by 60-80% of current Visual Analog Pain Scale Score.  3. Patient’s active range-of-motion will be within normal limits with no/minimal pain.   4. Patient will be able to tolerate standing activities for greater than or equal to 90 minutes without pain/difficulty.  5. Patient will demonstrate proper body mechanics for sitting and standing.  6. Patient will be able to tolerate sitting activities for greater than or equal to 90 minutes without pain/difficulty.  7. Patient will be able to sleep/rollover in bed without pain or difficulty.   8. Patient will be able to walk without pain or difficulty for greater than or equal to 45 minutes.   9. Patient will be able to bend and lift for activities of daily living and instrumental activities of daily living completion at home and work without pain/difficulty.  10. Patient’s DOD/VA pain questionnaire will be decreased by 50-70%.       Other treatment options discussed with patient: none    Patient rates her pain post treatment at 5/10 with 10 being worst.    Patient is to return  for continued care and treatment of her condition  consistent with plan of care.    Length of Visit: 30 min.    On 4/18/2022, Mary SUN scribed the services personally performed by Tc Mccarthy DC    The documentation recorded by the scribe accurately and completely reflects the service(s) I personally performed and the decisions made by me.        [id number]

## 2022-12-27 NOTE — ED PROVIDER NOTE - PROGRESS NOTE DETAILS
librado: after 2 negative trops, while reassessing to discharge patient, pt stated he had 10 seconds of severe rlq pain and lightheadedness/presyncope associated, she states this hapeppenms often/intermittantly, will ct patient signed out to me by Dr. Luna pending CT. CT showing uncomplicated diverticulitis. will treat. stable for outpatient followup. Albert Larose

## 2022-12-30 ENCOUNTER — INPATIENT (INPATIENT)
Facility: HOSPITAL | Age: 77
LOS: 1 days | Discharge: HOME | DRG: 392 | End: 2023-01-01
Attending: INTERNAL MEDICINE | Admitting: INTERNAL MEDICINE
Payer: MEDICARE

## 2022-12-30 VITALS
OXYGEN SATURATION: 99 % | SYSTOLIC BLOOD PRESSURE: 149 MMHG | HEIGHT: 63 IN | WEIGHT: 169.98 LBS | HEART RATE: 70 BPM | TEMPERATURE: 98 F | DIASTOLIC BLOOD PRESSURE: 89 MMHG | RESPIRATION RATE: 18 BRPM

## 2022-12-30 LAB
ALBUMIN SERPL ELPH-MCNC: 3.5 G/DL — SIGNIFICANT CHANGE UP (ref 3.5–5)
ALP SERPL-CCNC: 57 U/L — SIGNIFICANT CHANGE UP (ref 40–120)
ALT FLD-CCNC: 21 U/L DA — SIGNIFICANT CHANGE UP (ref 10–60)
ANION GAP SERPL CALC-SCNC: 9 MMOL/L — SIGNIFICANT CHANGE UP (ref 5–17)
AST SERPL-CCNC: 36 U/L — SIGNIFICANT CHANGE UP (ref 10–40)
BASOPHILS # BLD AUTO: 0.04 K/UL — SIGNIFICANT CHANGE UP (ref 0–0.2)
BASOPHILS NFR BLD AUTO: 0.8 % — SIGNIFICANT CHANGE UP (ref 0–2)
BILIRUB SERPL-MCNC: 0.6 MG/DL — SIGNIFICANT CHANGE UP (ref 0.2–1.2)
BUN SERPL-MCNC: 12 MG/DL — SIGNIFICANT CHANGE UP (ref 7–18)
CALCIUM SERPL-MCNC: 8.8 MG/DL — SIGNIFICANT CHANGE UP (ref 8.4–10.5)
CHLORIDE SERPL-SCNC: 108 MMOL/L — SIGNIFICANT CHANGE UP (ref 96–108)
CO2 SERPL-SCNC: 25 MMOL/L — SIGNIFICANT CHANGE UP (ref 22–31)
CREAT SERPL-MCNC: 0.96 MG/DL — SIGNIFICANT CHANGE UP (ref 0.5–1.3)
EGFR: 61 ML/MIN/1.73M2 — SIGNIFICANT CHANGE UP
EOSINOPHIL # BLD AUTO: 0.2 K/UL — SIGNIFICANT CHANGE UP (ref 0–0.5)
EOSINOPHIL NFR BLD AUTO: 3.8 % — SIGNIFICANT CHANGE UP (ref 0–6)
FLUAV AG NPH QL: SIGNIFICANT CHANGE UP
FLUBV AG NPH QL: SIGNIFICANT CHANGE UP
GLUCOSE SERPL-MCNC: 92 MG/DL — SIGNIFICANT CHANGE UP (ref 70–99)
HCT VFR BLD CALC: 36.8 % — SIGNIFICANT CHANGE UP (ref 34.5–45)
HGB BLD-MCNC: 12.3 G/DL — SIGNIFICANT CHANGE UP (ref 11.5–15.5)
IMM GRANULOCYTES NFR BLD AUTO: 0.2 % — SIGNIFICANT CHANGE UP (ref 0–0.9)
LIDOCAIN IGE QN: 76 U/L — SIGNIFICANT CHANGE UP (ref 73–393)
LYMPHOCYTES # BLD AUTO: 0.94 K/UL — LOW (ref 1–3.3)
LYMPHOCYTES # BLD AUTO: 17.7 % — SIGNIFICANT CHANGE UP (ref 13–44)
MCHC RBC-ENTMCNC: 30.6 PG — SIGNIFICANT CHANGE UP (ref 27–34)
MCHC RBC-ENTMCNC: 33.4 GM/DL — SIGNIFICANT CHANGE UP (ref 32–36)
MCV RBC AUTO: 91.5 FL — SIGNIFICANT CHANGE UP (ref 80–100)
MONOCYTES # BLD AUTO: 0.47 K/UL — SIGNIFICANT CHANGE UP (ref 0–0.9)
MONOCYTES NFR BLD AUTO: 8.9 % — SIGNIFICANT CHANGE UP (ref 2–14)
NEUTROPHILS # BLD AUTO: 3.64 K/UL — SIGNIFICANT CHANGE UP (ref 1.8–7.4)
NEUTROPHILS NFR BLD AUTO: 68.6 % — SIGNIFICANT CHANGE UP (ref 43–77)
NRBC # BLD: 0 /100 WBCS — SIGNIFICANT CHANGE UP (ref 0–0)
NT-PROBNP SERPL-SCNC: 258 PG/ML — SIGNIFICANT CHANGE UP (ref 0–450)
PLATELET # BLD AUTO: 330 K/UL — SIGNIFICANT CHANGE UP (ref 150–400)
POTASSIUM SERPL-MCNC: 5.3 MMOL/L — SIGNIFICANT CHANGE UP (ref 3.5–5.3)
POTASSIUM SERPL-SCNC: 5.3 MMOL/L — SIGNIFICANT CHANGE UP (ref 3.5–5.3)
PROT SERPL-MCNC: 7.8 G/DL — SIGNIFICANT CHANGE UP (ref 6–8.3)
RBC # BLD: 4.02 M/UL — SIGNIFICANT CHANGE UP (ref 3.8–5.2)
RBC # FLD: 13.6 % — SIGNIFICANT CHANGE UP (ref 10.3–14.5)
SARS-COV-2 RNA SPEC QL NAA+PROBE: SIGNIFICANT CHANGE UP
SODIUM SERPL-SCNC: 142 MMOL/L — SIGNIFICANT CHANGE UP (ref 135–145)
TROPONIN I, HIGH SENSITIVITY RESULT: 9.2 NG/L — SIGNIFICANT CHANGE UP
WBC # BLD: 5.3 K/UL — SIGNIFICANT CHANGE UP (ref 3.8–10.5)
WBC # FLD AUTO: 5.3 K/UL — SIGNIFICANT CHANGE UP (ref 3.8–10.5)

## 2022-12-30 PROCEDURE — 99285 EMERGENCY DEPT VISIT HI MDM: CPT

## 2022-12-30 PROCEDURE — 71045 X-RAY EXAM CHEST 1 VIEW: CPT | Mod: 26

## 2022-12-30 RX ORDER — METRONIDAZOLE 500 MG
500 TABLET ORAL ONCE
Refills: 0 | Status: COMPLETED | OUTPATIENT
Start: 2022-12-30 | End: 2022-12-30

## 2022-12-30 RX ORDER — SODIUM CHLORIDE 9 MG/ML
1000 INJECTION INTRAMUSCULAR; INTRAVENOUS; SUBCUTANEOUS ONCE
Refills: 0 | Status: COMPLETED | OUTPATIENT
Start: 2022-12-30 | End: 2022-12-30

## 2022-12-30 RX ADMIN — Medication 30 MILLILITER(S): at 19:56

## 2022-12-30 RX ADMIN — Medication 100 MILLIGRAM(S): at 23:29

## 2022-12-30 RX ADMIN — SODIUM CHLORIDE 1000 MILLILITER(S): 9 INJECTION INTRAMUSCULAR; INTRAVENOUS; SUBCUTANEOUS at 23:29

## 2022-12-30 NOTE — ED PROVIDER NOTE - WR ORDER ID 1
Virtual VISIT      Patient: Loni Lerma Date of Service: 2020   : 1970 MRN: [id number]     SUBJECTIVE:     Chief Complaint   Patient presents with   • Follow-up     3 month check up        PCP: Luiz Zavala MD    HISTORY OF PRESENT ILLNESS:    A 49-year old female presents for a Virtual Visit via Telephone for follow-up. This is being used during the COVID-19 crisis in place of an in-person exam.    Verbal consent for initiation of telemedicine visit was obtained. The patient agreed to the following conditions: they may be charged a co-pay; they are in a private area during the telephone encounter; and they agree that [facility] is not responsible for data charges.    She is a known type 2, currently poorly controlled on insulin and hypoglycemic therapy regimen, presents for follow up.   Has had multiple episodes of hyperglycemia and few episodes of hypoglycemia. Currently the diabetes is complicated by retinopathy.    Last visit was via phone on 2020.    Reports recent ongoing symptoms of hyperglycemia after intake lunch and dinner.  Monitors blood sugar at home using CGM.  Reports the average blood glucose level for the last seven days is 159 mg/dL.   CGM review and interpretation:  CGM readings as per the patient's reader device:    Duration of review: Last 7 days.  Average glucose: 159 mg/dL  Time above the target blood glucose: 52%  Time between the target range: 42%  Time below the target range: 6%  Weekly summary and daily log:  Her blood glucose levels are elevated between 6 PM to 12 PM. Review of notes revealed she was 46% in the target range in the past. And, the blood glucose levels were elevated from midday to 6 PM in the past, which have changed to between 6 PM to 12 PM at present. Reports her blood glucose levels are elevated after lunch and dinner. Mentions her blood glucose levels after dinner and at bedtime is elevated.     Review of log by review of the  freestyle jr reveals her fasting blood glucose is 117 mg/dL this morning, 126 mg/dL yesterday, 139 mg/dL on July 6, 2020, 112 mg/dL on July 5, 2020, 116 mg/dL on July 4, 2020.  Reports one episode of hypoglycemia in the last 7 days between 12 PM to 6 AM. She also has episodes of hypoglycemia between 12 PM to 6 PM, probably because she has delayed her meal.  Reports her most recent HbA1c is 8.5% on June 20, 2020, as per the lab results completed by Dr. Luiz Zavala. Review of reports shows her HbA1c was 8.9% in December 2019 and 10.4% prior to it.     No results found for: GLUCOSE   Lab Results   Component Value Date    POCGLU 194 (A) 12/30/2019    POCGLU 147 (A) 11/04/2019    POCGLU 118 (A) 09/30/2019   No results found for: HGBA1C     Lab Results   Component Value Date    KHYNXQDC0A 8.9 (A) 12/30/2019    HEZINUQZ1Y 10.4 (A) 09/30/2019     Reports being compliant on medications, as per instructions. On insulin Degludec 54 units. She was on 60 units before; however, the dose was reduced to 54 units because she was having hypoglycemic symptoms. Takes insulin Aspart 22 units before breakfast,18 units before lunch and 24 units before dinner. Mentions she has tried taking 0.5 mg two times of Semaglutide and was tolerating it well.  Takes Metformin 1000 mg daily and Canagliflozin 100 mg daily before breakfast. We have tried a higher dose of Canagliflozin, and she had side effects of yeast infection in the past.   Reports she had a discussion with me on using an insulin pump; however, unable to come to any conclusion.    Wt Readings from Last 3 Encounters:   12/30/19 122 kg (268 lb 15.4 oz)   11/04/19 122 kg (268 lb 15.4 oz)   09/30/19 121 kg (266 lb 12.1 oz)     BMI Readings from Last 3 Encounters:   12/30/19 44.76 kg/m²   11/04/19 44.76 kg/m²   09/30/19 44.39 kg/m²       Diet: Compliant with recommendations affecting glycemic control. Mentions she experiences low glucose levels occasionally probably because she was  not hungry sometimes due to lack of activities as she is working from home.  Follows carbohydrate restriction. Reports getting enough protein and veggies. Reports no snacking in between meals.     Exercise: Compliant with lifestyle recommendations.   Reports following regular regimen for physical activity.  Reports she has a stationary bike in her basement. And, she does exercises and goes walking. However, she feels tired and sluggish occasionally.       Factors affecting  glycemic control:   Ability to exercise now- Yes  Monitoring of sugars at home- Yes     Reports Dr. Luiz Zavala has completed her labs recently. Her cholesterol level was normal.       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    FAMILY HISTORY:  No family history on file.    SOCIAL HISTORY:  Social History     Tobacco Use   Smoking Status Never Smoker   Smokeless Tobacco Never Used     Social History     Substance and Sexual Activity   Alcohol Use Not Currently       Review of Systems    Constitutional: Positive for appetite change and fatigue/lethargy.     OBJECTIVE:     There were no vitals taken for this visit.    Physical Exam      Constitutional: No acute auditory distress   Throat: Vocal tone is normal and appropriate   Chest: regular respiratory rate via audio, without cough or distress  Neuro: awake and oriented, normal speech   Psych: Behavior appropriate. Normal mood, affect, thought and cognition.    DIAGNOSTIC STUDIES:   LAB RESULTS:    No results found for: GFRNA    No results found for: CREATININE    No results found for: CHOLESTEROL  No results found for:  HDL  No results found for: CHOHDL  No results found for: TRIGLYCERIDE  No results found for: CALCLDL    No results found for: URMIC        ASSESSMENT AND PLAN:   This is a 49 year old year-old female who presents with     1. 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  Diabetes is currently progressing as evidenced by labs.  Advised to increase the dose of insulin Aspart to 26 units before breakfast,  26 units before lunch and 22 units before dinner with appropriate explanation of expected effects and potential side effects. We will reduce the dose if she has hypoglycemia.  Advised to decrease the dose of insulin Degludec to 52 units given she has been having low blood glucose levels overnight with appropriate explanation of expected effects and potential side effects. Recommended reducing the dose of insulin Degludec to 50 units, if she has episodes of hypoglycemia overnight in next 14 days.   Advised to continue current dose of Metformin, Canagliflozin and Semaglutide with appropriate explanation of expected effects and potential side effects. Informed Canagliflozin also available in combination with Metformin; however, we will continue the present dose at present.  Advised on sustained levels of activity. Informed we need to adjust the dose of insulin if she has episodes of hypoglycemia after exercising. Asked to inform if she has episodes of hypoglycemia while/after exercising.  Recommended messaging the time of exercising and episodes of hypoglycemia.   Advised to bring a blood glucose meter or log  to each clinic visit.     Recommended sharing the recent lab reports completed by Dr. Luiz Zavala. Recommended her to send the pictures of the lab reports including the CMP, cholesterol levels, urine test, microalbumin and kidney function-creatinine and GFR.  Recommended using a tube insulin pump. Discussed various types of insulin pump- Tandem (tube pump), Medtronic and Omnipod (tubeless pump). Recommended researching about these insulin pumps online.   Encouraged to remain compliant with the above medications.  Encouraged to remain compliant with diet restrictions, which were discussed in detail.  Check blood glucose daily.    Obesity, morbid, BMI 40.0-49.9 (CMS/[facility]):  Reviewed.   Advised on sustained levels of activity. Significance explained in detail.     Follow up recommended in three months.     I personally spent 19 minutes with the patient during this real-time, interactive virtual clinical encounter, which was conducted using telephone-only technology, more than 50% of which was devoted to counseling and coordinating care for the above issues.        No orders of the defined types were placed in this encounter.         Summary of your Discharge Medications          Accurate as of July 8, 2020 11:59 PM. Always use your most recent med list.            Take these Medications      Details   aspirin 81 MG tablet   Take 81 mg by mouth daily.     blood glucose test strip   Test blood sugar 4  times daily as directed. Diagnosis: e11.65 . Meter:  contour next     canagliflozin 100 MG tablet  Commonly known as:  Invokana   Take 1 tablet by mouth daily (before breakfast).     cloNIDine 0.2 MG tablet  Commonly known as:  CATAPRES   Take 0.2 mg by mouth 2 times daily.     Crestor 40 MG tablet   Generic drug:  rosuvastatin  Take 0.5 tablets by mouth daily.     FreeStyle Harish 14 Day [employer]  Device   1 Device daily.     Glucophage 1000 MG tablet   Generic drug:  metformin  Take 1,000 mg by mouth 2 times daily (with meals).     NovoLOG FlexPen 100 UNIT/ML pen-injector   Generic drug:  insulin aspart  Prime 2 units before each dose. 26 units before breakfast   26 units before lunch   and 22 before dinner     olmesartan-hydrochlorothiazide 40-25 MG per tablet  Commonly known as:  BENICAR HCT   Take 1 tablet by mouth daily.     Semaglutide (1 MG/DOSE) (1.34 mg/ml) 1 MG/DOSE Solution Pen-injector  Commonly known as:  Ozempic (1 MG/DOSE)   Inject 1 mg into the skin 1 day a week.     Toprol  MG 24 hr tablet   Generic drug:  metoPROLOL succinate  Take 100 mg by mouth daily.     Tresiba 100 UNIT/ML Solution   Generic drug:  Insulin Degludec  Inject 52 Units into the skin daily.     vitamin - therapeutic multivitamins w/minerals tablet             Medications Discontinued During This Encounter   Medication Reason   • insulin aspart (NOVOLOG FLEXPEN) 100 UNIT/ML pen-injector Reorder   • Insulin Degludec (TRESIBA) 100 UNIT/ML Solution Reorder       Instructions provided as documented in the AVS.    No follow-ups on file.    Entered by Dr. Mirela Valentine acting as scribe for Dr. Jyothsna Palla, MD Tanushree Gorai  Note  of the scribe was reviewed  and edited as needed by Jyothsna Palla MD    Electronically signed by:  Jyothsna Palla, MD  [facility]  [address]  [facility] [address]  Dept Phone: [phone]        [id number]

## 2022-12-30 NOTE — ED PROVIDER NOTE - NSFOLLOWUPINSTRUCTIONS_ED_ALL_ED_FT
Adult
Near-Syncope    Outline of the head showing blood vessels that supply the brain.   Near-syncope is when you suddenly feel like you might pass out or faint, but you do not actually lose consciousness. This may also be referred to as presyncope. During an episode of near-syncope, you may:  •Feel dizzy, weak, light-headed, or like the room is spinning.      •Feel nauseous.      •See spots or see all white or all black in your field of vision.      •Have cold, clammy skin or feel warm and sweaty.      •Hear ringing in your ears (tinnitus).      This condition is caused by a sudden decrease in blood flow to the brain. This decrease can result from various causes, but most of those causes are not dangerous. However, near-syncope may be a sign of a serious medical problem, so it is important to seek medical care.      Follow these instructions at home:    Medicines     •Take over-the-counter and prescription medicines only as told by your health care provider.      •If you are taking blood pressure or heart medicine, get up slowly and take several minutes to sit and then stand. This can reduce dizziness and decrease the risk of near-syncope.      Lifestyle     • Do not drive, use machinery, or play sports until your health care provider says it is okay.      • Do not drink alcohol.      • Do not use any products that contain nicotine or tobacco. These products include cigarettes, chewing tobacco, and vaping devices, such as e-cigarettes. If you need help quitting, ask your health care provider.      •Avoid hot tubs and saunas.      General instructions     •Pay attention to any changes in your symptoms.      •Talk with your health care provider about your symptoms. You may need to have testing to understand the cause of your near-syncope.    •If you start to feel like you might faint, sit or lie down right away. If sitting, put your head down between your legs. If lying down, raise (elevate) your feet above the level of your heart.  •Breathe deeply and steadily. Wait until all of the symptoms have passed.      •Have someone stay with you until you feel stable.        •Drink enough fluid to keep your urine pale yellow.    •Avoid prolonged standing. If you must stand for a long time, do movements such as:  •Moving your legs.      •Crossing your legs.      •Flexing and stretching your leg muscles.      •Squatting.        •Keep all follow-up visits. This is important.        Contact a health care provider if:    •You continue to have episodes of near fainting.        Get help right away if:    •You faint.    •You have any of these symptoms that may indicate trouble with your heart:  •Fast or irregular heartbeats (palpitations).      •Unusual pain in your chest, abdomen, or back.      •Shortness of breath.        •You have a seizure.      •You have a severe headache.      •You are confused.      •You have vision problems.      •You have severe weakness or trouble walking.      •You are bleeding from your mouth or rectum, or have black or tarry stool.      These symptoms may represent a serious problem that is an emergency. Do not wait to see if your symptoms will go away. Get medical help right away. Call your local emergency services (911 in the [place] Do not drive yourself to the hospital.       Summary    •Near-syncope is when you suddenly feel like you might pass out or faint, but you do not actually lose consciousness.      •This condition is caused by a sudden decrease in blood flow to the brain. This decrease can result from various causes, but most of those causes are not dangerous.      •Near-syncope may be a sign of a serious medical problem, so it is important to seek medical care.      •If you start to feel like you might faint, sit or lie down right away. If sitting, put your head down between your legs. If lying down, raise (elevate) your feet above the level of your heart.      •Talk with your health care provider about your symptoms. You may need to have testing to understand the cause of your near-syncope.      This information is not intended to replace advice given to you by your health care provider. Make sure you discuss any questions you have with your health care provider.

## 2022-12-30 NOTE — ED ADULT TRIAGE NOTE - CHIEF COMPLAINT QUOTE
present with gneralized weakness that started about 3 hours ago and epigastric pain was recently seen in ER and discharged

## 2022-12-30 NOTE — ED PROVIDER NOTE - COVID-19 RESULT DATE/TIME
[FreeTextEntry1] : Simi is a 13yo female with past medical history of mitochondrial disease, chronic respiratory failure tracheostomy dependent, g-tube with colostomy, CKD s/p renal transplant, refractory seizure disorder, s/p parietal and occipital corticectomy and hippocampectomy, and global developmental delay here for follow up after Botox injections on June 14, 2022.\par \par Mom reports that patient had a mixed response from the last injections.  She had good initial improvement in elbow flexibility for about 2 weeks following the injection but since then has returned back to normal.  She was able to extend the left elbow to -90 degrees of extension.  She however saw significant improvement in finger and hand Rashard ability.  She continues to have improved tone in the fingers of both sides.  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  She notes considerable improvement in range of motion, positioning, and ease of cares.  She also thinks that maybe there is a slight improvement in overall tone to the lower limbs.
27-Dec-2022 15:30

## 2022-12-30 NOTE — ED PROVIDER NOTE - PATIENT PORTAL LINK FT
You can access the FollowMyHealth Patient Portal offered by [facility] by registering at the following website: [email or web address]. By joining [employer]’s FollowMyHealth portal, you will also be able to view your health information using other applications (apps) compatible with our system.

## 2022-12-30 NOTE — ED PROVIDER NOTE - CLINICAL SUMMARY MEDICAL DECISION MAKING FREE TEXT BOX
77 year old female with recent diagnoses of diverticulitis here for epigastric pain accompanying tingling and weakness. Symptoms suggest vasal vagal reaction. Check labs, and ekg. Anticipate discharge if patient continues to feel well. 77 year old female with recent diagnoses of diverticulitis here for epigastric pain accompanying tingling and weakness. Symptoms suggest vagal reaction. Check labs, and ekg. Anticipate discharge if patient continues to feel well. 77 year old female with recent diagnoses of diverticulitis here for epigastric pain accompanying tingling and weakness. Symptoms suggest vagal reaction. Check labs, and ekg. Anticipate discharge if patient continues to feel well.    [place] 0330:  Pt s/o pending clinical reassessment after fluids. On reassessment pt states that she is still nauseous with ABD discomfort and doesn't feel like she can go home. Pt stable and endorsed to inpatient team.

## 2022-12-30 NOTE — ED PROVIDER NOTE - OBJECTIVE STATEMENT
77 year old female who's recently diagnosed with diverticulitis presents with epigastric pain shortly taking antibiotics. Developed associated tingling sensation to hand and dizziness that lasted several minutes. Symptoms resolved since then. Patient states "I got my strength back". She had bowel movement today without vomiting, and fever. Currently denies abdominal pain. 77 year old female who's recently diagnosed with diverticulitis presents with epigastric pain shortly taking antibiotics. Developed associated tingling sensation to hand and dizziness that lasted several minutes. Symptoms resolved since then. Patient states "I got my strength back". She had bowel movement today AM nonbloody. No vomiting CP, SOB, and fever. Currently denies abdominal pain.

## 2022-12-30 NOTE — ED PROVIDER NOTE - NEUROLOGICAL, MLM
Alert and oriented, no focal deficits, no motor or sensory deficits. Information: Selecting Yes will display possible errors in your note based on the variables you have selected. This validation is only offered as a suggestion for you. PLEASE NOTE THAT THE VALIDATION TEXT WILL BE REMOVED WHEN YOU FINALIZE YOUR NOTE. IF YOU WANT TO FAX A PRELIMINARY NOTE YOU WILL NEED TO TOGGLE THIS TO 'NO' IF YOU DO NOT WANT IT IN YOUR FAXED NOTE.

## 2022-12-30 NOTE — ED PROVIDER NOTE - PROGRESS NOTE DETAILS
Pt continues to feel better. Educated pt on results care and f/u. Pt relates upper abd discomfort, educated on EKG, labs and results so far. Will provide IV hydration IV abx while in ED and reassess later. Plan to d/c if improved.

## 2022-12-30 NOTE — ED ADULT TRIAGE NOTE - CADM TRG TX PRIOR TO ARRIVAL
Subjective   Jet Floyd is a 73 y.o.male who presents to the ED with an altered mental status. Per his wife, the patient was at his baseline when he went to bed last night. This morning when he woke up he had difficulty getting out of his bed and appeared fatigued, dizzy and was disoriented. He was diaphoretic and his wife fed him a peanut butter and jelly sandwich with some orange juice. EMS was then called to bring him to the ED for evaluation. Upon arrival, EMS measured his FSBS at 30. At the ED his wife states that he has not been eating regularly and has increased his daily alcohol consumption recently. He denies abdominal pain, nausea, vomiting or any other acute symptoms at this time. His wife reports that he has returned to baseline.    No history of diabetes mellitus, and he is not currently taking any anti-diabetic medication.        History provided by:  Patient  Altered Mental Status   Presenting symptoms: confusion and disorientation    Most recent episode:  Today  Episode history:  Single  Timing:  Constant  Progression:  Unchanged  Chronicity:  New  Associated symptoms: light-headedness, palpitations (chronic) and slurred speech    Associated symptoms: no abdominal pain, no nausea and no vomiting        Review of Systems   Constitutional: Positive for fatigue.   Eyes:        Chronic strabismus   Cardiovascular: Positive for palpitations (chronic).   Gastrointestinal: Negative for abdominal pain, nausea and vomiting.   Neurological: Positive for dizziness and light-headedness.   Psychiatric/Behavioral: Positive for confusion.   All other systems reviewed and are negative.      Past Medical History:   Diagnosis Date   • Cancer    • CHF (congestive heart failure)    • Hypertension        Allergies   Allergen Reactions   • Polysporin [Bacitracin-Polymyxin B] Itching and Rash       Past Surgical History:   Procedure Laterality Date   • CARDIAC DEFIBRILLATOR PLACEMENT Bilateral 1998   • CARDIAC  DEFIBRILLATOR REMOVAL Bilateral 2003   • CATARACT EXTRACTION Bilateral    • CERVICAL SPINE SURGERY Bilateral 2014       Family History   Problem Relation Age of Onset   • Dementia Mother    • Cancer Father      prostate   • No Known Problems Sister    • No Known Problems Brother    • No Known Problems Daughter        Social History     Social History   • Marital status:      Social History Main Topics   • Smoking status: Former Smoker     Packs/day: 1.50     Years: 48.00     Types: Cigarettes   • Smokeless tobacco: Never Used   • Alcohol use 21.0 oz/week     35 Shots of liquor per week      Comment: liquor daily 5-6   • Drug use: No   • Sexual activity: Defer     Other Topics Concern   • Not on file         Objective   Physical Exam   Constitutional: He is oriented to person, place, and time. He appears well-developed and well-nourished. No distress.   HENT:   Head: Normocephalic and atraumatic.   Mouth/Throat: No oropharyngeal exudate.   Airway patent. Pharynx benign.   Eyes: Conjunctivae are normal. No scleral icterus.   Dysconjugate gaze.   Neck: Normal range of motion. Neck supple. No JVD present.   Cardiovascular: Normal rate, regular rhythm and normal heart sounds.  Exam reveals no gallop and no friction rub.    No murmur heard.  Pulmonary/Chest: Effort normal. No respiratory distress. He has no wheezes. He has rhonchi. He has no rales.   Good air movement. Very minimal diffuse rhonchi.   Abdominal: Soft. Bowel sounds are normal. He exhibits no distension. There is no tenderness. There is no rebound and no guarding.   Musculoskeletal: Normal range of motion. He exhibits no edema.   Neurological: He is alert and oriented to person, place, and time.    strength 4.5/5. Lower extremity strength 5/5.   Skin: Skin is warm and dry. He is not diaphoretic.   Psychiatric: He has a normal mood and affect. His behavior is normal.   Nursing note and vitals reviewed.      Critical Care  Performed by: KADEEM  BINH MCKEON  Authorized by: BINH MARTINEZ     Critical care provider statement:     Critical care time (minutes):  45    Critical care time was exclusive of:  Separately billable procedures and treating other patients    Critical care was necessary to treat or prevent imminent or life-threatening deterioration of the following conditions:  Sepsis, respiratory failure and endocrine crisis    Critical care was time spent personally by me on the following activities:  Evaluation of patient's response to treatment, examination of patient, obtaining history from patient or surrogate, ordering and performing treatments and interventions, ordering and review of laboratory studies, ordering and review of radiographic studies, re-evaluation of patient's condition, development of treatment plan with patient or surrogate, pulse oximetry, discussions with consultants, review of old charts and interpretation of cardiac output measurements  Comments:      Re-evaluated several times, and he remained stable.  Severe sepsis recognized.  Antibiotics administered.  Severe sepsis fluid bolus given.               ED Course  ED Course   Comment By Time   Dr. Martinez spoke with Dr. Perkins, hospitalist, who will admit the patient. Ang [facility] 03/20 1824   Patient had negative sepsis screen upon admission.  However, as more data returned, infection (pneumonia) suspected, and blood cultures, lactic acid ordered along with initial antibiotics.  Sepsis suspected at 1715, antibiotics started shortly after 1800.  Severe sepsis documented with return of elevated lactate.  Fluid bolus ordered. Binh Martinez MD 03/20 1824     Recent Results (from the past 24 hour(s))   POC Glucose Once    Collection Time: 03/20/18  2:51 PM   Result Value Ref Range    Glucose 138 (H) 70 - 130 mg/dL   Comprehensive Metabolic Panel    Collection Time: 03/20/18  3:05 PM   Result Value Ref Range    Glucose 142 (H) 70 - 100 mg/dL    BUN 8 (L) 9 - 23 mg/dL     Creatinine 1.10 0.60 - 1.30 mg/dL    Sodium 135 132 - 146 mmol/L    Potassium 4.2 3.5 - 5.5 mmol/L    Chloride 102 99 - 109 mmol/L    CO2 14.0 (L) 20.0 - 31.0 mmol/L    Calcium 8.3 (L) 8.7 - 10.4 mg/dL    Total Protein 6.9 5.7 - 8.2 g/dL    Albumin 3.40 3.20 - 4.80 g/dL    ALT (SGPT) 29 7 - 40 U/L    AST (SGOT) 100 (H) 0 - 33 U/L    Alkaline Phosphatase 139 (H) 25 - 100 U/L    Total Bilirubin 1.8 (H) 0.3 - 1.2 mg/dL    eGFR Non African Amer 66 >60 mL/min/1.73    Globulin 3.5 gm/dL    A/G Ratio 1.0 (L) 1.5 - 2.5 g/dL    BUN/Creatinine Ratio 7.3 7.0 - 25.0    Anion Gap 19.0 (H) 3.0 - 11.0 mmol/L   Magnesium    Collection Time: 03/20/18  3:05 PM   Result Value Ref Range    Magnesium 1.8 1.3 - 2.7 mg/dL   Light Blue Top    Collection Time: 03/20/18  3:05 PM   Result Value Ref Range    Extra Tube hold for add-on    Green Top (Gel)    Collection Time: 03/20/18  3:05 PM   Result Value Ref Range    Extra Tube Hold for add-ons.    Lavender Top    Collection Time: 03/20/18  3:05 PM   Result Value Ref Range    Extra Tube hold for add-on    Gold Top - SST    Collection Time: 03/20/18  3:05 PM   Result Value Ref Range    Extra Tube Hold for add-ons.    CBC Auto Differential    Collection Time: 03/20/18  3:05 PM   Result Value Ref Range    WBC 14.20 (H) 3.50 - 10.80 10*3/mm3    RBC 3.95 (L) 4.20 - 5.76 10*6/mm3    Hemoglobin 13.3 13.1 - 17.5 g/dL    Hematocrit 40.3 38.9 - 50.9 %    .0 (H) 80.0 - 99.0 fL    MCH 33.7 (H) 27.0 - 31.0 pg    MCHC 33.0 32.0 - 36.0 g/dL    RDW 15.9 (H) 11.3 - 14.5 %    RDW-SD 57.9 (H) 37.0 - 54.0 fl    MPV 11.1 6.0 - 12.0 fL    Platelets 146 (L) 150 - 450 10*3/mm3    Neutrophil % 92.0 (H) 41.0 - 71.0 %    Lymphocyte % 2.9 (L) 24.0 - 44.0 %    Monocyte % 4.4 0.0 - 12.0 %    Eosinophil % 0.1 0.0 - 3.0 %    Basophil % 0.4 0.0 - 1.0 %    Immature Grans % 0.2 0.0 - 0.6 %    Neutrophils, Absolute 13.08 (H) 1.50 - 8.30 10*3/mm3    Lymphocytes, Absolute 0.41 (L) 0.60 - 4.80 10*3/mm3    Monocytes,  Absolute 0.62 0.00 - 1.00 10*3/mm3    Eosinophils, Absolute 0.01 0.00 - 0.30 10*3/mm3    Basophils, Absolute 0.05 0.00 - 0.20 10*3/mm3    Immature Grans, Absolute 0.03 0.00 - 0.03 10*3/mm3   Ethanol    Collection Time: 03/20/18  3:05 PM   Result Value Ref Range    Ethanol 56 (H) 0 - 10 mg/dL   POC Troponin, Rapid    Collection Time: 03/20/18  3:08 PM   Result Value Ref Range    Troponin I 0.01 0.00 - 0.07 ng/mL   POC Glucose Once    Collection Time: 03/20/18  4:09 PM   Result Value Ref Range    Glucose 137 (H) 70 - 130 mg/dL   Lactic Acid, Plasma    Collection Time: 03/20/18  5:15 PM   Result Value Ref Range    Lactate 4.9 (C) 0.5 - 2.0 mmol/L   Procalcitonin    Collection Time: 03/20/18  5:15 PM   Result Value Ref Range    Procalcitonin 0.26 (H) <=0.25 ng/mL   Lactic Acid, Reflex Timer (This will reflex a repeat order 3-3:15 hours after ordered.)    Collection Time: 03/20/18  5:15 PM   Result Value Ref Range    Extra Tube Hold for add-ons.    Urinalysis With / Culture If Indicated - Urine, Clean Catch    Collection Time: 03/20/18  6:37 PM   Result Value Ref Range    Color, UA Yellow Yellow, Straw    Appearance, UA Clear Clear    pH, UA 5.5 5.0 - 8.0    Specific Gravity, UA 1.010 1.001 - 1.030    Glucose, UA Negative Negative    Ketones, UA Trace (A) Negative    Bilirubin, UA Negative Negative    Blood, UA Trace (A) Negative    Protein, UA Negative Negative    Leuk Esterase, UA Negative Negative    Nitrite, UA Negative Negative    Urobilinogen, UA 0.2 E.U./dL 0.2 - 1.0 E.U./dL   Urinalysis, Microscopic Only - Urine, Clean Catch    Collection Time: 03/20/18  6:37 PM   Result Value Ref Range    RBC, UA 3-6 (A) None Seen, 0-2 /HPF    WBC, UA None Seen None Seen, 0-2 /HPF    Bacteria, UA None Seen None Seen, Trace /HPF    Squamous Epithelial Cells, UA 0-2 None Seen, 0-2 /HPF    Hyaline Casts, UA 0-6 0 - 6 /LPF    Methodology Automated Microscopy      Note: In addition to lab results from this visit, the labs listed  above may include labs taken at another facility or during a different encounter within the last 24 hours. Please correlate lab times with ED admission and discharge times for further clarification of the services performed during this visit.    CT Chest Without Contrast   Preliminary Result   Area of confluent opacification left lower lung periphery   with micronodular appearance surrounding this consistent with acute   airspace disease however underlying pulmonary nodule not excluded.   Follow-up to resolution recommended given background emphysematous and   smoking-related changes of the lungs.              XR Chest 1 View   Preliminary Result   1.  Worrisome constellation of increased density left lower lobe   retrocardiac region with a focal area of increased density in the   periphery of the right mid lung, both of these are new from distant   studies. Parenchymal lung disease or even space occupying lesions cannot   be excluded.   2.  Otherwise, there is a background of COPD. The heart size is normal   and there is no free fluid.       D:  03/20/2018   E:  03/20/2018                    Vitals:    03/20/18 1801 03/20/18 1803 03/20/18 1900 03/20/18 2030   BP: 148/75  142/72 134/73   BP Location:       Patient Position:       Pulse:  89 98 88   Resp:       Temp:       TempSrc:       SpO2:  99%     Weight:       Height:         Medications   sodium chloride 0.9 % flush 10 mL (not administered)   thiamine (VITAMIN B-1) tablet 100 mg (100 mg Oral Given 3/20/18 1537)   sodium chloride 0.9 % bolus 1,770 mL (0 mL/kg × 59 kg Intravenous Stopped 3/20/18 1933)   AZITHROMYCIN 500 MG/250 ML 0.9% NS IVPB (MBP) (0 mg Intravenous Stopped 3/20/18 1902)   cefTRIAXone (ROCEPHIN) IVPB 2 g (0 g Intravenous Stopped 3/20/18 2002)     ECG/EMG Results (last 24 hours)     Procedure Component Value Units Date/Time    ECG 12 Lead [[id number]] Collected:  03/20/18 1508     Updated:  03/20/18 1509                    [facility]      Final  diagnoses:   Severe sepsis   Pneumonia of left lower lobe due to infectious organism   Hypoglycemia   Alcoholism       Documentation assistance provided by danika Walker.  Information recorded by the scribe was done at my direction and has been verified and validated by me.     Ang Walker  03/20/18 1518       Ang Walker  03/20/18 1830       Binh Martinez MD  03/20/18 0026     none

## 2022-12-31 ENCOUNTER — TRANSCRIPTION ENCOUNTER (OUTPATIENT)
Age: 77
End: 2022-12-31

## 2022-12-31 DIAGNOSIS — K57.92 DIVERTICULITIS OF INTESTINE, PART UNSPECIFIED, WITHOUT PERFORATION OR ABSCESS WITHOUT BLEEDING: ICD-10-CM

## 2022-12-31 DIAGNOSIS — R55 SYNCOPE AND COLLAPSE: ICD-10-CM

## 2022-12-31 DIAGNOSIS — Z29.9 ENCOUNTER FOR PROPHYLACTIC MEASURES, UNSPECIFIED: ICD-10-CM

## 2022-12-31 LAB
ANION GAP SERPL CALC-SCNC: 4 MMOL/L — LOW (ref 5–17)
BASOPHILS # BLD AUTO: 0.04 K/UL — SIGNIFICANT CHANGE UP (ref 0–0.2)
BASOPHILS NFR BLD AUTO: 0.8 % — SIGNIFICANT CHANGE UP (ref 0–2)
BUN SERPL-MCNC: 11 MG/DL — SIGNIFICANT CHANGE UP (ref 7–18)
CALCIUM SERPL-MCNC: 8.9 MG/DL — SIGNIFICANT CHANGE UP (ref 8.4–10.5)
CHLORIDE SERPL-SCNC: 110 MMOL/L — HIGH (ref 96–108)
CO2 SERPL-SCNC: 29 MMOL/L — SIGNIFICANT CHANGE UP (ref 22–31)
CREAT SERPL-MCNC: 1.05 MG/DL — SIGNIFICANT CHANGE UP (ref 0.5–1.3)
EGFR: 55 ML/MIN/1.73M2 — LOW
EOSINOPHIL # BLD AUTO: 0.17 K/UL — SIGNIFICANT CHANGE UP (ref 0–0.5)
EOSINOPHIL NFR BLD AUTO: 3.3 % — SIGNIFICANT CHANGE UP (ref 0–6)
GLUCOSE SERPL-MCNC: 121 MG/DL — HIGH (ref 70–99)
HCT VFR BLD CALC: 35.7 % — SIGNIFICANT CHANGE UP (ref 34.5–45)
HGB BLD-MCNC: 11.6 G/DL — SIGNIFICANT CHANGE UP (ref 11.5–15.5)
IMM GRANULOCYTES NFR BLD AUTO: 1.2 % — HIGH (ref 0–0.9)
LYMPHOCYTES # BLD AUTO: 0.81 K/UL — LOW (ref 1–3.3)
LYMPHOCYTES # BLD AUTO: 15.7 % — SIGNIFICANT CHANGE UP (ref 13–44)
MAGNESIUM SERPL-MCNC: 2.1 MG/DL — SIGNIFICANT CHANGE UP (ref 1.6–2.6)
MCHC RBC-ENTMCNC: 30.1 PG — SIGNIFICANT CHANGE UP (ref 27–34)
MCHC RBC-ENTMCNC: 32.5 GM/DL — SIGNIFICANT CHANGE UP (ref 32–36)
MCV RBC AUTO: 92.7 FL — SIGNIFICANT CHANGE UP (ref 80–100)
MONOCYTES # BLD AUTO: 0.43 K/UL — SIGNIFICANT CHANGE UP (ref 0–0.9)
MONOCYTES NFR BLD AUTO: 8.3 % — SIGNIFICANT CHANGE UP (ref 2–14)
NEUTROPHILS # BLD AUTO: 3.65 K/UL — SIGNIFICANT CHANGE UP (ref 1.8–7.4)
NEUTROPHILS NFR BLD AUTO: 70.7 % — SIGNIFICANT CHANGE UP (ref 43–77)
NRBC # BLD: 0 /100 WBCS — SIGNIFICANT CHANGE UP (ref 0–0)
PHOSPHATE SERPL-MCNC: 2.6 MG/DL — SIGNIFICANT CHANGE UP (ref 2.5–4.5)
PLATELET # BLD AUTO: 316 K/UL — SIGNIFICANT CHANGE UP (ref 150–400)
POTASSIUM SERPL-MCNC: 4.1 MMOL/L — SIGNIFICANT CHANGE UP (ref 3.5–5.3)
POTASSIUM SERPL-SCNC: 4.1 MMOL/L — SIGNIFICANT CHANGE UP (ref 3.5–5.3)
RBC # BLD: 3.85 M/UL — SIGNIFICANT CHANGE UP (ref 3.8–5.2)
RBC # FLD: 13.7 % — SIGNIFICANT CHANGE UP (ref 10.3–14.5)
SODIUM SERPL-SCNC: 143 MMOL/L — SIGNIFICANT CHANGE UP (ref 135–145)
WBC # BLD: 5.16 K/UL — SIGNIFICANT CHANGE UP (ref 3.8–10.5)
WBC # FLD AUTO: 5.16 K/UL — SIGNIFICANT CHANGE UP (ref 3.8–10.5)

## 2022-12-31 PROCEDURE — 12345: CPT | Mod: NC

## 2022-12-31 PROCEDURE — 99223 1ST HOSP IP/OBS HIGH 75: CPT

## 2022-12-31 RX ORDER — LATANOPROST 0.05 MG/ML
1 SOLUTION/ DROPS OPHTHALMIC; TOPICAL AT BEDTIME
Refills: 0 | Status: DISCONTINUED | OUTPATIENT
Start: 2022-12-31 | End: 2023-01-01

## 2022-12-31 RX ORDER — CIPROFLOXACIN LACTATE 400MG/40ML
1 VIAL (ML) INTRAVENOUS
Qty: 0 | Refills: 0 | DISCHARGE
Start: 2022-12-31

## 2022-12-31 RX ORDER — CIPROFLOXACIN LACTATE 400MG/40ML
500 VIAL (ML) INTRAVENOUS EVERY 12 HOURS
Refills: 0 | Status: DISCONTINUED | OUTPATIENT
Start: 2022-12-31 | End: 2023-01-01

## 2022-12-31 RX ORDER — ENOXAPARIN SODIUM 100 MG/ML
40 INJECTION SUBCUTANEOUS EVERY 24 HOURS
Refills: 0 | Status: DISCONTINUED | OUTPATIENT
Start: 2022-12-31 | End: 2023-01-01

## 2022-12-31 RX ORDER — PANTOPRAZOLE SODIUM 20 MG/1
40 TABLET, DELAYED RELEASE ORAL
Refills: 0 | Status: DISCONTINUED | OUTPATIENT
Start: 2022-12-31 | End: 2023-01-01

## 2022-12-31 RX ORDER — SODIUM CHLORIDE 9 MG/ML
1000 INJECTION INTRAMUSCULAR; INTRAVENOUS; SUBCUTANEOUS
Refills: 0 | Status: DISCONTINUED | OUTPATIENT
Start: 2022-12-31 | End: 2022-12-31

## 2022-12-31 RX ORDER — METRONIDAZOLE 500 MG
1 TABLET ORAL
Qty: 0 | Refills: 0 | DISCHARGE
Start: 2022-12-31

## 2022-12-31 RX ORDER — ONDANSETRON 8 MG/1
4 TABLET, FILM COATED ORAL ONCE
Refills: 0 | Status: COMPLETED | OUTPATIENT
Start: 2022-12-31 | End: 2022-12-31

## 2022-12-31 RX ORDER — METRONIDAZOLE 500 MG
500 TABLET ORAL EVERY 8 HOURS
Refills: 0 | Status: DISCONTINUED | OUTPATIENT
Start: 2022-12-31 | End: 2023-01-01

## 2022-12-31 RX ADMIN — Medication 500 MILLIGRAM(S): at 17:27

## 2022-12-31 RX ADMIN — ENOXAPARIN SODIUM 40 MILLIGRAM(S): 100 INJECTION SUBCUTANEOUS at 06:01

## 2022-12-31 RX ADMIN — ONDANSETRON 4 MILLIGRAM(S): 8 TABLET, FILM COATED ORAL at 06:01

## 2022-12-31 RX ADMIN — Medication 500 MILLIGRAM(S): at 21:58

## 2022-12-31 RX ADMIN — SODIUM CHLORIDE 80 MILLILITER(S): 9 INJECTION INTRAMUSCULAR; INTRAVENOUS; SUBCUTANEOUS at 10:38

## 2022-12-31 RX ADMIN — Medication 500 MILLIGRAM(S): at 14:09

## 2022-12-31 NOTE — DISCHARGE NOTE PROVIDER - CARE PROVIDER_API CALL
Kt Madden  INTERNAL MEDICINE  [phone] [place] [address]  Phone: [phone]  Fax: [phone]  Follow Up Time: 1 week   Kt Madden  INTERNAL MEDICINE  [address] [place] [address]  Phone: [phone]  Fax: [phone]  Follow Up Time: 1 week    Justin Oakley)  Surgery  [address] [facility], [address]  [place] [address]  Phone: [phone]  Fax: [phone]  Scheduled Appointment: 03/30/2023

## 2022-12-31 NOTE — DISCHARGE NOTE PROVIDER - NSDCFUADDINST_GEN_ALL_CORE_FT
PLEASE FOLLOW UP WITH COLORECTAL SURGEON DR. HENNESSY AS ADVISED TO EVALUATE AND DISCUSS POTENTIAL SURGICAL OPTION  FOLLOW UP WITH GASTROENTEROLOGIST AS OUTPATIENT

## 2022-12-31 NOTE — DISCHARGE NOTE PROVIDER - NSDCCPCAREPLAN_GEN_ALL_CORE_FT
PRINCIPAL DISCHARGE DIAGNOSIS  Diagnosis: Diverticulitis  Assessment and Plan of Treatment: You presented with lightheadedness associated with abdominal pain. You were recently in the hospital on 12/27 which you had CT of the abdomen and pelvis that showed sigmoid diverticulitis. You were discharged on antibiotics (ciprofloxacin and flagyl)  We advanced your diet this morning (regular diet) and you tolerated it well without abdominal pain, nausea or vomiting.  Diverticulitis is an inflammation or infection in one or more small pouches in the digestive tract  complete your antibiotics as prescribed  Once current treatment is completed please see gastroenterologist as outpatient, you might need to do colonoscopy   If you have severe abdominal pain, fever, nausea and change in bowel habits, please reach out to your primary phisician  Once your symptoms resolve, you should eat more high fiber foods.   eat less red meat, high-fat dairy products       PRINCIPAL DISCHARGE DIAGNOSIS  Diagnosis: Diverticulitis  Assessment and Plan of Treatment: You presented with lightheadedness associated with abdominal pain. You were recently in the hospital on 12/27 which you had CT of the abdomen and pelvis that showed sigmoid diverticulitis. You were discharged on antibiotics (ciprofloxacin and flagyl)  We advanced your diet (regular diet) and you tolerated it well without abdominal pain, nausea or vomiting.  Surgery was consulted for recuurent diverticulitis and history of microperforation. No acute intervention at this time, follow up with surgeon once discharge.  Diverticulitis is an inflammation or infection in one or more small pouches in the digestive tract.  Complete your antibiotics as prescribed  Once current treatment is completed please see gastroenterologist as outpatient, you might need to do colonoscopy   If you have severe abdominal pain, fever, nausea and change in bowel habits, please reach out to your primary phisician  Once your symptoms resolve, you should eat more high fiber foods.   eat less red meat, high-fat dairy products       PRINCIPAL DISCHARGE DIAGNOSIS  Diagnosis: Diverticulitis  Assessment and Plan of Treatment: You presented with lightheadedness associated with abdominal pain. You were recently in the hospital on 12/27 which you had CT of the abdomen and pelvis that showed sigmoid diverticulitis. You were discharged on antibiotics (ciprofloxacin and flagyl)  We advanced your diet (regular diet) and you tolerated it well without abdominal pain, nausea or vomiting.  Surgery was consulted for recuurent diverticulitis and history of microperforation. No acute intervention at this time, follow up with surgeon once discharge.  Diverticulitis is an inflammation or infection in one or more small pouches in the digestive tract.  Complete your antibiotics as prescribed (ciprofloxacin and flagyl)  Once current treatment is completed please see gastroenterologist as outpatient, you might need to do colonoscopy   If you have severe abdominal pain, fever, nausea and change in bowel habits, please reach out to your primary phisician  Once your symptoms resolve, you should eat more high fiber foods.   eat less red meat, high-fat dairy products       PRINCIPAL DISCHARGE DIAGNOSIS  Diagnosis: Diverticulitis  Assessment and Plan of Treatment: You presented with lightheadedness associated with abdominal pain. You were recently in the hospital on 12/27 which you had CT of the abdomen and pelvis that showed sigmoid diverticulitis. You were discharged on antibiotics (ciprofloxacin and flagyl) from Emergency Room.  You reported not being able to tolerate diet at home and took medications (antibiotics) which may have resulted in GI upset.   You have hisotry of recurrent Diverticulitis and was previously hospitalized under Surgical service 6 months ago.You was continued on Antibiotics Cipro and FMetronidazole.   We advanced your diet (regular diet) and you tolerated it well without abdominal pain, nausea or vomiting.  Surgery was consulted for recuurent diverticulitis and history of microperforation. No acute intervention at this time, follow up with surgeon once discharge.   PLEASE FOLLOW UP WITH COLORECTAL SURGEON DR. HENNESSY AS PER INFORMATION PROVIDED TO EVALAYATE NEED FOR SURGICAL INTERVENTION (information provided)  .  YOU MAY ALSO FOLLOW UP WITH A GASTROENTEROLOGIST (GI) FOR A COLONOSCOPY IN 6 TO 8 WEEKS.   Diverticulitis is an inflammation or infection in one or more small pouches in the digestive tract.  Complete your antibiotics as prescribed (ciprofloxacin and flagyl)  Once current treatment is completed please see gastroenterologist as outpatient, you might need to do colonoscopy   If you have severe abdominal pain, fever, nausea and change in bowel habits, please reach out to your primary phisician  Once your symptoms resolve, you should eat more high fiber foods.   eat less red meat, high-fat dairy products      SECONDARY DISCHARGE DIAGNOSES  Diagnosis: History of glaucoma  Assessment and Plan of Treatment: You have reported prior hisotry of Glaucoma. You may continue with eye drops as prescribed by your eye doctor id any evidence of increased pressure in your eyes.     PRINCIPAL DISCHARGE DIAGNOSIS  Diagnosis: Diverticulitis  Assessment and Plan of Treatment: You presented with lightheadedness associated with abdominal pain. You were recently in the hospital on 12/27 which you had CT of the abdomen and pelvis that showed sigmoid diverticulitis. You were discharged on antibiotics (ciprofloxacin and flagyl) from Emergency Room.  You reported not being able to tolerate diet at home and took medications (antibiotics) which may have resulted in GI upset.   You have hisotry of recurrent Diverticulitis and was previously hospitalized under Surgical service 6 months ago.You was continued on Antibiotics Cipro and FMetronidazole.   We advanced your diet (regular diet) and you tolerated it well without abdominal pain, nausea or vomiting.  Surgery was consulted for recuurent diverticulitis and history of microperforation. No acute intervention at this time, follow up with surgeon once discharge.   PLEASE FOLLOW UP WITH COLORECTAL SURGEON DR. HENNESSY AS PER INFORMATION PROVIDED TO EVALAYATE NEED FOR SURGICAL INTERVENTION (information provided)  .  YOU MAY ALSO FOLLOW UP WITH A GASTROENTEROLOGIST (GI) FOR A COLONOSCOPY IN 6 TO 8 WEEKS.   YOU MAY CONTINUE WITH PROTONIX FOR ONE WEEK TO MINIMIZE STOMACH SIDE EFFECT WHILE ON ANTIBIOTICS.   Diverticulitis is an inflammation or infection in one or more small pouches in the digestive tract.  Complete your antibiotics as prescribed (ciprofloxacin and flagyl)  Once current treatment is completed please see gastroenterologist as outpatient, you might need to do colonoscopy   If you have severe abdominal pain, fever, nausea and change in bowel habits, please reach out to your primary phisician  Once your symptoms resolve, you should eat more high fiber foods.   eat less red meat, high-fat dairy products      SECONDARY DISCHARGE DIAGNOSES  Diagnosis: History of glaucoma  Assessment and Plan of Treatment: You have reported prior hisotry of Glaucoma. You may continue with eye drops as prescribed by your eye doctor id any evidence of increased pressure in your eyes.     PRINCIPAL DISCHARGE DIAGNOSIS  Diagnosis: Diverticulitis  Assessment and Plan of Treatment: You presented with lightheadedness associated with abdominal pain. You were recently in the hospital on 12/27 which you had CT of the abdomen and pelvis that showed sigmoid diverticulitis. You were discharged on antibiotics (ciprofloxacin and flagyl) from Emergency Room.  You reported not being able to tolerate diet at home and took medications (antibiotics) which may have resulted in GI upset.   You have hisotry of recurrent Diverticulitis and was previously hospitalized under Surgical service 6 months ago.You was continued on Antibiotics Cipro and FMetronidazole.   We advanced your diet (regular diet) and you tolerated it well without abdominal pain, nausea or vomiting.  Surgery was consulted for recuurent diverticulitis and history of microperforation. No acute intervention at this time, follow up with surgeon once discharge.   PLEASE FOLLOW UP WITH COLORECTAL SURGEON DR. HENNESSY AS PER INFORMATION PROVIDED TO EVALAYATE NEED FOR SURGICAL INTERVENTION (information provided)  .  YOU MAY ALSO FOLLOW UP WITH A GASTROENTEROLOGIST (GI) FOR A COLONOSCOPY IN 6 TO 8 WEEKS.   YOU MAY CONTINUE WITH PROTONIX FOR ONE WEEK TO MINIMIZE STOMACH SIDE EFFECT WHILE ON ANTIBIOTICS.   Diverticulitis is an inflammation or infection in one or more small pouches in the digestive tract.  Complete your antibiotics as prescribed (ciprofloxacin and flagyl)  You was given some dietary counseling by Dietitian to minimize risk of recurrent attacks.  Once current treatment is completed please see gastroenterologist as outpatient, you might need to do colonoscopy   If you have severe abdominal pain, fever, nausea and change in bowel habits, please reach out to your primary phisician  Once your symptoms resolve, you should eat more high fiber foods.   eat less red meat, high-fat dairy products      SECONDARY DISCHARGE DIAGNOSES  Diagnosis: History of glaucoma  Assessment and Plan of Treatment: You have reported prior hisotry of Glaucoma. You may continue with eye drops as prescribed by your eye doctor id any evidence of increased pressure in your eyes.

## 2022-12-31 NOTE — PATIENT PROFILE ADULT - FUNCTIONAL ASSESSMENT - DAILY ACTIVITY 5.
"sarah and re-evaluate.\"    " 3 = A little assistance

## 2022-12-31 NOTE — H&P ADULT - HISTORY OF PRESENT ILLNESS
77 year old with hx of glaucoma presents with abdominal pain. Patient was recently seen in ED two days ago and was found to have diverticulitis and was discharged on cipro and flagyl. Patient states when she went home she was unable to tolerate PO intake and felt nauseous and continued to have generalized abdominal discomfort.  Patient denies any fever, chills, vomiting or diarrhea     In the ED:  Afebrile, hemodynamically stable  No leukocytosis, no electrolyte abnormalities  s/p flagyl + 1L bolus

## 2022-12-31 NOTE — H&P ADULT - NSHPREVIEWOFSYSTEMS_GEN_ALL_CORE
REVIEW OF SYSTEMS:    CONSTITUTIONAL: No weakness, fevers or chills  EYES/ENT: No visual changes;  No vertigo or throat pain   NECK: No pain or stiffness  RESPIRATORY: No cough, wheezing, hemoptysis; No shortness of breath  CARDIOVASCULAR: No chest pain or palpitations  GASTROINTESTINAL: + abdominal no epigastric pain. + nausea, no vomiting, or hematemesis; No diarrhea or constipation. No melena or hematochezia.  GENITOURINARY: No dysuria, frequency or hematuria  NEUROLOGICAL: No numbness or weakness  SKIN: No itching, burning, rashes, or lesions   All other review of systems is negative unless indicated above.

## 2022-12-31 NOTE — DISCHARGE NOTE PROVIDER - CARE PROVIDERS DIRECT ADDRESSES
[email or web address]

## 2022-12-31 NOTE — DISCHARGE NOTE PROVIDER - ATTENDING DISCHARGE PHYSICAL EXAMINATION:
Psych: AAO x3  Neuro: No gross focal deficits; Power and sensation intact  CVS: S1S2 present, regular, no edema  Resp: BLAE+, No wheeze or Rhonchi  GI: Soft, BS+, very mild tender,ness only on deep palpation RLQ non distended  Extr: No  calf tenderness B/L Lower extremities  Skin: Warm and moist without any rashes

## 2022-12-31 NOTE — DISCHARGE NOTE PROVIDER - NSDCMRMEDTOKEN_GEN_ALL_CORE_FT
acetaminophen 325 mg oral tablet: 2 tab(s) orally every 6 hours, As needed, Temp greater or equal to 38C (100.4F)  Cipro 500 mg oral tablet: 1 tab(s) orally 2 times a day   Cipro 500 mg oral tablet: 1 tab(s) orally every 12 hours  latanoprost 0.005% ophthalmic solution: 1 drop(s) in each eye once a day (at bedtime)  metroNIDAZOLE 500 mg oral tablet: 1 tab(s) orally 3 times a day   metroNIDAZOLE 500 mg oral tablet: 1 tab(s) orally every 8 hours   acetaminophen 325 mg oral tablet: 2 tab(s) orally every 6 hours, As needed, Temp greater or equal to 38C (100.4F)  Cipro 500 mg oral tablet: 1 tab(s) orally 2 times a day   metroNIDAZOLE 500 mg oral tablet: 1 tab(s) orally 3 times a day   pantoprazole 40 mg oral delayed release tablet: 1 tab(s) orally once a day (before a meal)

## 2022-12-31 NOTE — H&P ADULT - NSHPPHYSICALEXAM_GEN_ALL_CORE
PHYSICAL EXAMINATION:  GENERAL: NAD,   HEAD:  Atraumatic, Normocephalic  EYES:  conjunctiva and sclera clear  NECK: Supple, No JVD, Normal thyroid  CHEST/LUNG: Clear to auscultation. Clear to percussion bilaterally; No rales, rhonchi, wheezing, or rubs  HEART: Regular rate and rhythm; No murmurs, rubs, or gallops  ABDOMEN: Soft, Nontender, Nondistended; Bowel sounds present  NERVOUS SYSTEM:  Alert & Oriented X3,    EXTREMITIES:  2+ Peripheral Pulses, No clubbing, cyanosis, or edema  SKIN: warm dry

## 2022-12-31 NOTE — CHART NOTE - NSCHARTNOTEFT_GEN_A_CORE
Patient seen and examined.    Recurrent Diverticulitis non Cipro and Flagyl with prior Hx of microperforation  IV Fluids Monitor on regualr diet x 24 hrs if tolerated well D/C Home AM  Add PPI as patient

## 2022-12-31 NOTE — DISCHARGE NOTE PROVIDER - PROVIDER TOKENS
PROVIDER:[TOKEN:[[id number]],FOLLOWUP:[1 week]] PROVIDER:[TOKEN:[[id number]],FOLLOWUP:[1 week]],PROVIDER:[TOKEN:[[id number]],SCHEDULEDAPPT:[03/30/2023]]

## 2022-12-31 NOTE — H&P ADULT - ATTENDING COMMENTS
77 year old woman with recent diagnosis of sigmoid diverticulitis on oral antibiotics here with complaints of epigastric pain, nausea and inability to keep anything down.   Got IVF and maalox in the ED    Vital Signs Last 24 Hrs  T(C): 36.6 (30 Dec 2022 18:12), Max: 36.6 (30 Dec 2022 18:12)  T(F): 97.8 (30 Dec 2022 18:12), Max: 97.8 (30 Dec 2022 18:12)  HR: 70 (30 Dec 2022 18:12) (70 - 70)  BP: 149/89 (30 Dec 2022 18:12) (149/89 - 149/89)  RR: 18 (30 Dec 2022 18:12) (18 - 18)  SpO2: 99% (30 Dec 2022 18:12) (99% - 99%)    Labs                         12.3   5.30  )-----------( 330      ( 30 Dec 2022 19:36 )             36.8     12-30    142  |  108  |  12  ----------------------------<  92  5.3   |  25  |  0.96    Ca    8.8      30 Dec 2022 19:36  TPro  7.8  /  Alb  3.5  /  TBili  0.6  /  DBili  x   /  AST  36  /  ALT  21  /  AlkPhos  57  12-30      -cov 19 /flu A/flu B - nondetected    Impression   - sigmoid diverticulitis  with acute abdominal discomfort     Plan   - Admit for supportive care   - Antiemetics amd maalox prn   - resume regular diet in AM   - Discharge planning

## 2022-12-31 NOTE — H&P ADULT - PROBLEM SELECTOR PLAN 1
p/w abdominal pain for three day  Was recently seen in ED on 12/27 and was found to have diverticulitis then  Afebrile, hemodynamically stable  No leukocytosis, no electrolyte abnormalities  s/p flagyl + 1L bolus  Will continue with flagy and ciprofloxacin   Will start IVF + zofran

## 2022-12-31 NOTE — PATIENT PROFILE ADULT - FALL HARM RISK - HARM RISK INTERVENTIONS

## 2022-12-31 NOTE — H&P ADULT - ASSESSMENT
77 year old F with hx of glaucoma presents with abdominal pain, was recently found to have diverticulitis. Admitted for further management.

## 2022-12-31 NOTE — DISCHARGE NOTE PROVIDER - HOSPITAL COURSE
Pt is 77 year old female with PMHx of glaucoma, carpal tunnel syndrome, carotid stenosis presents with abdominal pain. Patient was recently seen in ED and was found to have diverticulitis and was discharged on ciprofloxacin and flagyl. Patient states when she went home she was unable to tolerate PO intake, felt nauseous and continued to have generalized abdominal discomfort.  Patient denies any fever, chills, vomiting or diarrhea. CT A/P on 12/27 showed sigmoid diverticulitis.    This morning, pt tolerated regular diet, no complains of abdominal pain, N/V on assessment, abdomen is soft, non-tender. Hemodynamically stable, on room air, not in any distress. Pt is 77 year old female with PMHx of glaucoma, carpal tunnel syndrome, carotid stenosis presents with abdominal pain. Patient was recently seen in ED and was found to have diverticulitis and was discharged on ciprofloxacin and flagyl. Patient states when she went home she was unable to tolerate PO intake, felt nauseous and continued to have generalized abdominal discomfort.  Patient denies any fever, chills, vomiting or diarrhea. CT A/P on 12/27 showed sigmoid diverticulitis. Admitted for recurrent diverticulitis. Surgery was consulted and recommended repeat CT if pain re-occurs or presence of leukocytosis or fever. To follow up with surgeon outpatient once discharge.    Pt has been tolerating regular diet, mild complains of right lower abdominal discomfort, denies N/V, abdomen is soft, non-tender. Hemodynamically stable, on room air, not in any distress. Pt is 77 year old female with PMHx of glaucoma, carpal tunnel syndrome, carotid stenosis presents with abdominal pain. Patient was recently seen in ED and was found to have diverticulitis and was discharged on ciprofloxacin and flagyl. Patient states when she went home she was unable to tolerate PO intake, felt nauseous and continued to have generalized abdominal discomfort.  Patient denies any fever, chills, vomiting or diarrhea. CT A/P on 12/27 showed sigmoid diverticulitis. Admitted for recurrent diverticulitis. Surgery was consulted, no acute interventions at this time. To follow up with surgeon outpatient once discharge.    Pt has been tolerating regular diet, no complains of abdominal pain/discomfort, denies N/V, abdomen is soft, non-tender. Hemodynamically stable, on room air, not in any distress.

## 2022-12-31 NOTE — H&P ADULT - NSHPLABSRESULTS_GEN_ALL_CORE
LABS:                        12.3   5.30  )-----------( 330      ( 30 Dec 2022 19:36 )             36.8     12-30    142  |  108  |  12  ----------------------------<  92  5.3   |  25  |  0.96    Ca    8.8      30 Dec 2022 19:36    TPro  7.8  /  Alb  3.5  /  TBili  0.6  /  DBili  x   /  AST  36  /  ALT  21  /  AlkPhos  57  12-30        LIVER FUNCTIONS - ( 30 Dec 2022 19:36 )  Alb: 3.5 g/dL / Pro: 7.8 g/dL / ALK PHOS: 57 U/L / ALT: 21 U/L DA / AST: 36 U/L / GGT: x           Lipase, Serum: 76 U/L (12-30-22 @ 19:36)

## 2022-12-31 NOTE — H&P ADULT - PROBLEM SELECTOR PLAN 2
IMPROVE VTE Individual Risk Assessment          RISK                                                          Points  [  ] Previous VTE                                                3  [  ] Thrombophilia                                             2  [  ] Lower limb paralysis                                   2        (unable to hold up >15 seconds)    [  ] Current Cancer                                             2         (within 6 months)  [x  ] Immobilization > 24 hrs                              1  [  ] ICU/CCU stay > 24 hours                             1  [ x ] Age > 60                                                         1    IMPROVE VTE Score:         [   2      ]    Lovenox 40mg

## 2022-12-31 NOTE — H&P ADULT - NSICDXNOPASTSURGICALHX_GEN_ALL_CORE
Daily Note     Today's date: 2018  Patient name: Kirit Pelaez  : 1958  MRN: [id number]  Referring provider: Abbey Frankel  Dx:   Encounter Diagnosis     ICD-10-CM    1  Cubital tunnel syndrome on left G56 22 Ambulatory referral to PT/OT hand therapy                  Subjective: See eval        Objective: See treatment diary below      Assessment: See eval  Fabricated orthotic per MD request in note  Patient would benefit from continued OT  Plan: Continue per plan of care  Progress treatment as tolerated  Treatment to focus on nerve gliding       Precautions: Blain    Specialty Daily Treatment Diary     Manual         IASTM FCU        Supine JOHNATHAN                                    Exercise Diary                                                                                                                                                                             Modalities        MHP pre <-- Click to add NO significant Past Surgical History

## 2022-12-31 NOTE — DISCHARGE NOTE PROVIDER - NSDCFUSCHEDAPPT_GEN_ALL_CORE_FT
Dax Cerrato  [facility]  GASTRO [address] [facility]  Scheduled Appointment: 01/20/2023

## 2023-01-01 ENCOUNTER — TRANSCRIPTION ENCOUNTER (OUTPATIENT)
Age: 78
End: 2023-01-01

## 2023-01-01 VITALS
TEMPERATURE: 97 F | HEART RATE: 81 BPM | RESPIRATION RATE: 18 BRPM | SYSTOLIC BLOOD PRESSURE: 146 MMHG | DIASTOLIC BLOOD PRESSURE: 79 MMHG | OXYGEN SATURATION: 99 %

## 2023-01-01 PROCEDURE — 85025 COMPLETE CBC W/AUTO DIFF WBC: CPT

## 2023-01-01 PROCEDURE — 87637 SARSCOV2&INF A&B&RSV AMP PRB: CPT

## 2023-01-01 PROCEDURE — 80048 BASIC METABOLIC PNL TOTAL CA: CPT

## 2023-01-01 PROCEDURE — 84100 ASSAY OF PHOSPHORUS: CPT

## 2023-01-01 PROCEDURE — 71045 X-RAY EXAM CHEST 1 VIEW: CPT

## 2023-01-01 PROCEDURE — 80053 COMPREHEN METABOLIC PANEL: CPT

## 2023-01-01 PROCEDURE — 93005 ELECTROCARDIOGRAM TRACING: CPT

## 2023-01-01 PROCEDURE — 36415 COLL VENOUS BLD VENIPUNCTURE: CPT

## 2023-01-01 PROCEDURE — 83735 ASSAY OF MAGNESIUM: CPT

## 2023-01-01 PROCEDURE — 83880 ASSAY OF NATRIURETIC PEPTIDE: CPT

## 2023-01-01 PROCEDURE — 99239 HOSP IP/OBS DSCHRG MGMT >30: CPT

## 2023-01-01 PROCEDURE — 84484 ASSAY OF TROPONIN QUANT: CPT

## 2023-01-01 PROCEDURE — 99285 EMERGENCY DEPT VISIT HI MDM: CPT

## 2023-01-01 PROCEDURE — 83690 ASSAY OF LIPASE: CPT

## 2023-01-01 PROCEDURE — 96374 THER/PROPH/DIAG INJ IV PUSH: CPT

## 2023-01-01 RX ORDER — PANTOPRAZOLE SODIUM 20 MG/1
1 TABLET, DELAYED RELEASE ORAL
Qty: 7 | Refills: 0
Start: 2023-01-01 | End: 2023-01-07

## 2023-01-01 RX ORDER — CIPROFLOXACIN LACTATE 400MG/40ML
1 VIAL (ML) INTRAVENOUS
Qty: 10 | Refills: 0
Start: 2023-01-01 | End: 2023-01-05

## 2023-01-01 RX ORDER — LATANOPROST 0.05 MG/ML
1 SOLUTION/ DROPS OPHTHALMIC; TOPICAL
Qty: 0 | Refills: 0 | DISCHARGE

## 2023-01-01 RX ORDER — METRONIDAZOLE 500 MG
1 TABLET ORAL
Qty: 15 | Refills: 0
Start: 2023-01-01 | End: 2023-01-05

## 2023-01-01 RX ADMIN — Medication 500 MILLIGRAM(S): at 08:08

## 2023-01-01 RX ADMIN — PANTOPRAZOLE SODIUM 40 MILLIGRAM(S): 20 TABLET, DELAYED RELEASE ORAL at 08:22

## 2023-01-01 NOTE — DISCHARGE NOTE NURSING/CASE MANAGEMENT/SOCIAL WORK - PATIENT PORTAL LINK FT
You can access the FollowMyHealth Patient Portal offered by [facility] by registering at the following website: [email or web address]. By joining [employer]’s FollowMyHealth portal, you will also be able to view your health information using other applications (apps) compatible with our system.

## 2023-01-01 NOTE — CONSULT NOTE ADULT - ASSESSMENT
no abdominal pain.  hx of reccurrent diveticulitis in past  recent discharge from hospital with diverticulitis, now pt asymptomatic  tolerating diet.    If pain reoccurs, or fever or leukocytosis develops this admission consider CT to evaluate for possible reoccurance of diverticulitis  presently pt without abd pain and tolerating diet  follow up with Surgeon in office when deemed medically stable for discharge  pt is on abx presently.

## 2023-01-01 NOTE — DISCHARGE NOTE NURSING/CASE MANAGEMENT/SOCIAL WORK - NSDCVIVACCINE_GEN_ALL_CORE_FT
Tdap; 25-Jun-2016 15:39; Mukesh Chaidez (RN); [employer]; [id number]; IntraMuscular; Deltoid Right.; 0.5 milliLiter(s); VIS (VIS Published: 09-May-2013, VIS Presented: 25-Jun-2016);

## 2023-01-01 NOTE — CHART NOTE - NSCHARTNOTEFT_GEN_A_CORE
Nutrition Focus Note:   Nutrition consult requested by Attending for DIETARY RESTRICTION AND ADVICE for Recurrent Diverticulitis. Chart reviewed, pt visited, alert, oriented, well-communicated; Nutrition information of Diverticulitis diet given with written copy provided, question/concerns discussed, very receptive, RD business card given for contact as needed; some food choices obtained and forwarded to Dietary, Also encouraged pt to contact Dietary for assistance of daily menu selection; pt complaining of back pain near the end of conversation, informed RN. Pending discharge planning per team Nutrition Focus Note:   Nutrition consult requested by Attending for DIETARY RESTRICTION AND ADVICE for Recurrent Diverticulitis, possible on discharge planning per MD. Chart reviewed, pt visited, alert, oriented, well-communicated; Nutrition information of Diverticulitis diet given with written copy provided, question/concerns discussed, very receptive, RD business card given for contact as needed; some food choices obtained and forwarded to Dietary, Also encouraged pt to contact Dietary for assistance of daily menu selection; pt complaining of back pain near the end of conversation, informed RN. Rec: add low fiber/residue to current diet Rx, diet Rx modified, pending verification Nutrition Focus Note:   Nutrition consult requested by Attending for DIETARY RESTRICTION AND ADVICE for Recurrent Diverticulitis, possible on discharge planning per MD. Chart reviewed, pt visited, alert, oriented, well-communicated; Nutrition information of Diverticulitis diet given with written copy provided, question/concerns discussed, pt very receptive, RD business card given for contact as needed; some food choices obtained and forwarded to Dietary, also encouraged pt to contact Dietary for assistance of daily menu selection; Near the end of conversation, pt complaining of back pain, informed RN. Rec: add low fiber/residue to current diet Rx, modified, pending verification. Discussed with MD/RN

## 2023-01-01 NOTE — CONSULT NOTE ADULT - SUBJECTIVE AND OBJECTIVE BOX
Called to evaluate pt for hx recurrent diverticulitis. Pt states she has no abdominal pain. Pt states she really didnt come in to the hospital for any abcominal pain but that she had dizzyness and weakness. Pt states she has had colonoscopies in past and within the past few years. No hx bloody bm. no fever. no n/v. Pt is eating during interview. Pt did not have Ct this admission .  Pt remains afebrile with no leukocytosis.  ICU Vital Signs Last 24 Hrs  T(C): 36.3 (01 Jan 2023 04:45), Max: 36.7 (31 Dec 2022 20:33)  T(F): 97.3 (01 Jan 2023 04:45), Max: 98 (31 Dec 2022 20:33)  HR: 78 (01 Jan 2023 04:45) (73 - 92)  BP: 137/886 (01 Jan 2023 04:45) (110/69 - 143/76)  BP(mean): --  ABP: --  ABP(mean): --  RR: 18 (01 Jan 2023 04:45) (18 - 18)  SpO2: 100% (01 Jan 2023 04:45) (97% - 100%)    O2 Parameters below as of 01 Jan 2023 04:45  Patient On (Oxygen Delivery Method): room air        Alert nad  Abd: soft nt nd no cvat  no calf swelling or erythema                          11.6   5.16  )-----------( 316      ( 31 Dec 2022 10:00 )             35.7   12-31    143  |  110<H>  |  11  ----------------------------<  121<H>  4.1   |  29  |  1.05    Ca    8.9      31 Dec 2022 10:00  Phos  2.6     12-31  Mg     2.1     12-31    TPro  7.8  /  Alb  3.5  /  TBili  0.6  /  DBili  x   /  AST  36  /  ALT  21  /  AlkPhos  57  12-30

## 2023-01-20 ENCOUNTER — APPOINTMENT (OUTPATIENT)
Dept: GASTROENTEROLOGY | Facility: CLINIC | Age: 78
End: 2023-01-20
Payer: MEDICARE

## 2023-01-20 VITALS
HEART RATE: 79 BPM | RESPIRATION RATE: 16 BRPM | TEMPERATURE: 98.1 F | OXYGEN SATURATION: 98 % | BODY MASS INDEX: 28.99 KG/M2 | DIASTOLIC BLOOD PRESSURE: 76 MMHG | WEIGHT: 174 LBS | SYSTOLIC BLOOD PRESSURE: 155 MMHG | HEIGHT: 65 IN

## 2023-01-20 DIAGNOSIS — K86.89 OTHER SPECIFIED DISEASES OF PANCREAS: ICD-10-CM

## 2023-01-20 DIAGNOSIS — K57.32 DIVERTICULITIS OF LARGE INTESTINE W/OUT PERFORATION OR ABSCESS W/OUT BLEEDING: ICD-10-CM

## 2023-01-20 DIAGNOSIS — Z01.818 ENCOUNTER FOR OTHER PREPROCEDURAL EXAMINATION: ICD-10-CM

## 2023-01-20 PROCEDURE — 99213 OFFICE O/P EST LOW 20 MIN: CPT

## 2023-01-20 RX ORDER — POLYETHYLENE GLYCOL 3350 AND ELECTROLYTES WITH LEMON FLAVOR 236; 22.74; 6.74; 5.86; 2.97 G/4L; G/4L; G/4L; G/4L; G/4L
236 POWDER, FOR SOLUTION ORAL
Qty: 1 | Refills: 0 | Status: ACTIVE | COMMUNITY
Start: 2023-01-20 | End: 1900-01-01

## 2023-01-20 NOTE — HISTORY OF PRESENT ILLNESS
[FreeTextEntry1] : 77F with pmhx of HTN, recurrent diverticuliits, recent admission for complicated diverticulitis, perforated with abscess managed conservatively with abx presenting for follow-up. Reports feeling well today other than chronic lower back pain. Denies any abd pain, n/v/d/c, melena, hematochezia, fever/chills, or other issues. Last colonocsopy she reports was 3 yrs ago which was unremarkable. Had EUS with mildly dilated pancreatic duct.

## 2023-01-20 NOTE — PHYSICAL EXAM
[Alert] : alert [Normal Voice/Communication] : normal voice/communication [Healthy Appearing] : healthy appearing [No Acute Distress] : no acute distress [Sclera] : the sclera and conjunctiva were normal [Hearing Threshold Finger Rub Not [place]] : hearing was normal [Normal Lips/Gums] : the lips and gums were normal [Oropharynx] : the oropharynx was normal [Normal Appearance] : the appearance of the neck was normal [No Neck Mass] : no neck mass was observed [No Respiratory Distress] : no respiratory distress [No Acc Muscle Use] : no accessory muscle use [Respiration, Rhythm And Depth] : normal respiratory rhythm and effort [Auscultation Breath Sounds / Voice Sounds] : lungs were clear to auscultation bilaterally [Heart Rate And Rhythm] : heart rate was normal and rhythm regular [Normal S1, S2] : normal S1 and S2 [Murmurs] : no murmurs [Bowel Sounds] : normal bowel sounds [Abdomen Tenderness] : non-tender [No Masses] : no abdominal mass palpated [Abdomen Soft] : soft [] : no hepatosplenomegaly [Oriented To Time, Place, And Person] : oriented to person, place, and time [de-identified] : Mild paraspinal muscle tenderness R side

## 2023-01-20 NOTE — ASSESSMENT
[FreeTextEntry1] : 77F with pmhx of HTN, recurrent diverticulitis, pancreatic ductal dilation presenting for follow-up. Last CT showed resolution of diverticulitis changes. States last colonoscopy >3 yrs ago. \par - Schedule colonoscopy given diverticulitis, no recent colonoscopy.\par - Bowel prep instructions given.\par - COVID testing 3 days prior.\par - MRI/MRCP in 3 months for surveillance of PD dilation.

## 2023-02-23 PROBLEM — K57.92 DIVERTICULITIS OF INTESTINE, PART UNSPECIFIED, WITHOUT PERFORATION OR ABSCESS WITHOUT BLEEDING: Chronic | Status: ACTIVE | Noted: 2022-12-30

## 2023-03-01 NOTE — ED ADULT NURSE NOTE - SKIN TEMPERATURE MOISTURE
warm Niacinamide Pregnancy And Lactation Text: These medications are considered safe during pregnancy.

## 2023-03-28 LAB — SARS-COV-2 N GENE NPH QL NAA+PROBE: NOT DETECTED

## 2023-03-28 NOTE — ASU PATIENT PROFILE, ADULT - FALL HARM RISK - UNIVERSAL INTERVENTIONS
Bed in lowest position, wheels locked, appropriate side rails in place/Call bell, personal items and telephone in reach/Instruct patient to call for assistance before getting out of bed or chair/Non-slip footwear when patient is out of bed/[place] to call system/Physically safe environment - no spills, clutter or unnecessary equipment/Purposeful Proactive Rounding/Room/bathroom lighting operational, light cord in reach

## 2023-03-29 ENCOUNTER — APPOINTMENT (OUTPATIENT)
Dept: GASTROENTEROLOGY | Facility: HOSPITAL | Age: 78
End: 2023-03-29

## 2023-03-29 ENCOUNTER — OUTPATIENT (OUTPATIENT)
Dept: OUTPATIENT SERVICES | Facility: HOSPITAL | Age: 78
LOS: 1 days | End: 2023-03-29
Payer: MEDICARE

## 2023-03-29 ENCOUNTER — TRANSCRIPTION ENCOUNTER (OUTPATIENT)
Age: 78
End: 2023-03-29

## 2023-03-29 VITALS
RESPIRATION RATE: 18 BRPM | DIASTOLIC BLOOD PRESSURE: 77 MMHG | OXYGEN SATURATION: 100 % | HEART RATE: 60 BPM | SYSTOLIC BLOOD PRESSURE: 133 MMHG

## 2023-03-29 VITALS
HEIGHT: 63 IN | WEIGHT: 169.98 LBS | DIASTOLIC BLOOD PRESSURE: 68 MMHG | HEART RATE: 63 BPM | OXYGEN SATURATION: 100 % | TEMPERATURE: 98 F | RESPIRATION RATE: 12 BRPM | SYSTOLIC BLOOD PRESSURE: 140 MMHG

## 2023-03-29 DIAGNOSIS — K57.32 DIVERTICULITIS OF LARGE INTESTINE WITHOUT PERFORATION OR ABSCESS WITHOUT BLEEDING: ICD-10-CM

## 2023-03-29 PROCEDURE — 45378 DIAGNOSTIC COLONOSCOPY: CPT

## 2023-03-29 PROCEDURE — G0121: CPT

## 2023-03-29 RX ORDER — SODIUM CHLORIDE 9 MG/ML
500 INJECTION INTRAMUSCULAR; INTRAVENOUS; SUBCUTANEOUS
Refills: 0 | Status: DISCONTINUED | OUTPATIENT
Start: 2023-03-29 | End: 2023-04-13

## 2023-04-18 NOTE — ED ADULT NURSE NOTE - CAS TRG GEN SKIN CONDITION
Lipids checked July 2022 and was within normal limits.   Needs to be checked on a yearly basis so should be due after July of this year  Thank you Warm

## 2023-05-02 NOTE — ED ADULT TRIAGE NOTE - NS ED NURSE DIRECT TO ROOM YN
Patient called and stated she has a hard time keeping medication Iron tablets down  And woul dlike to know of alternatives       Please advise   Thank you
Yes

## 2023-07-02 NOTE — ED PROVIDER NOTE - NS ED MD DISPO DISCHARGE CCDA
Chief complaint:   No chief complaint on file.      Vitals:  Visit Vitals  LMP 12/22/2022 (Exact Date)       HISTORY OF PRESENT ILLNESS     HPI  This 41yo female is here for f/u on depression.  She just started a new job.  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  Depression is pretty good.    She is on strattera and guanfacine.  It has helped with concentration and the buzz feeling in her head.    Constipation still an issue.  Goes every 2-3 days and it is hard.  Senna 2 tabs causes diarrhea.      Other significant problems:  Patient Active Problem List    Diagnosis Date Noted   • Vitamin D deficiency 01/07/2023     Priority: Low   • Mild intermittent asthma without complication 01/07/2023     Priority: Low   • Major depressive disorder, single episode, moderate (CMD) 01/06/2023     Priority: Low   • Irritable bowel syndrome with constipation 01/21/2020     Priority: Low       PAST MEDICAL, FAMILY AND SOCIAL HISTORY     Medications:  Current Outpatient Medications   Medication Sig Dispense Refill   • atomoxetine (STRATTERA) 25 MG capsule 2 capsules every morning 60 capsule 1   • buPROPion XL (WELLBUTRIN XL) 150 MG 24 hr tablet Take 1 tablet by mouth every morning. 30 tablet 1   • guanFACINE (INTUNIV) 1 MG TABLET SR 24 HR Take 1 tablet by mouth at bedtime. 30 tablet 1   • montelukast (SINGULAIR) 10 MG tablet Take 1 tablet by mouth nightly. 90 tablet 3   • acetaminophen (TYLENOL) 325 MG tablet Take 2 tablets by mouth every 6 hours as needed for Pain.     • ibuprofen (MOTRIN) 600 MG tablet Take 1 tablet by mouth every 6 hours as needed for Pain.     • Docusate Calcium (STOOL SOFTENER PO)        No current facility-administered medications for this visit.       Allergies:  ALLERGIES:  No Known Allergies    Past Medical  History/Surgeries:  Past Medical History:   Diagnosis Date   • Endometriosis    • History of HPV infection    • IBS (irritable bowel syndrome)    • Infertility of tubal  origin    • Kidney stones    • Recurrent UTI (urinary tract infection)    • Renal calculi 4/14/2020   • Varicella        Past Surgical History:   Procedure Laterality Date   • Breast enhancement surgery     • Colposcopy     • Conization cervix,loop electrd     • Fertility embryo transfer  12/01/2020   • Implantable breast prosthesis  2008   • Polypectomy  11/2020    uterine   • Removal of tonsils,13+ y/o  2003   • Salpingectomy Left 06/2019    Endometriosis   • [place] tooth extraction  2008       Family History:  Family History   Problem Relation Age of Onset   • Patient is unaware of any medical problems Mother    • Hyperlipidemia Father    • Patient is unaware of any medical problems Sister    • Bipolar disorder Sister    • Obsesive Compulsive Disorder Sister    • Alcohol Abuse Sister         now sober   • Alcohol Abuse Brother         now sober   • Diabetes Maternal Grandmother    • Heart Maternal Grandmother    • Cancer, Lung Maternal Grandmother    • Cancer, Skin Maternal Grandfather    • Cancer, Breast Paternal Grandmother 70   • Cancer Paternal Grandmother         Cervical       Social History:  Social History     Tobacco Use   • Smoking status: Never   • Smokeless tobacco: Never   Substance Use Topics   • Alcohol use: Not Currently     Comment: socially  rare       REVIEW OF SYSTEMS     Review of Systems    PHYSICAL EXAM     Physical Exam  Constitutional:       General: She is not in acute distress.     Appearance: Normal appearance.      Comments: Brighter.  Less agitated or fidgety.     Neurological:      Mental Status: She is alert.         ASSESSMENT/PLAN     1.  HCM:  Covid declined. Tdap 5/25/21.  Pap 6/18/20. mammo 1/11/23. Labs 12/30/22 with low vit D.   2.  Hypothyroid with pregnancy:  normal now 12/30/22.  3.  infertility:  IVF for her boys.  No birth control because of infertility.    4.  Cold induce asthma:  Continue singulair 10mg daily and use albuterol prn.    5.  Vitamin D def: Conintue on  Vitamin D3 2000iu daily.    6.  ADHD:  being seen by psychiatry.  Atomoxetine 50mg daily started. Intuniv 1mg at bedtime added- suggested that she give this a 2 week trial to see if fatigue is better.    7.  IBS with constipation:  Using docusate 100mg BID and magnesium 500mg, 2-4 chews daily.  decrease senna to 1 tab and see if soft stool but not diarrhea.    8.  Situational depression:  Now resolved - has a job.  Has a h/o depression in the past as a teenager.  Will wean Wellbutrin xl 150mg to QOD for 2 weeks then stop.    Patient/Caregiver provided printed discharge information.

## 2023-08-09 NOTE — ED ADULT NURSE NOTE - HIV OFFER
Previously Declined (within the last year) Mildly to Moderately Impaired: difficulty hearing in some environments or speaker may need to increase volume or speak distinctly

## 2023-08-30 NOTE — ED PROVIDER NOTE - IV ALTEPLASE EXCL ABS HIDDEN
Class II - visualization of the soft palate, fauces, and uvula Class II - visualization of the soft palate, fauces, and uvula Class III - visualization of the soft palate and the base of the uvula show

## 2023-09-05 NOTE — ED ADULT NURSE NOTE - FALL HARM RISK TYPE OF ASSESSMENT
Refill request denied. Pt still has refills left on this med. PLEASE USE 4/12/23 ORDER    Identified need for reassessment stated

## 2023-09-25 NOTE — ED PROVIDER NOTE - PSYCHIATRIC, MLM
Test for [facility]  Notify office if you do not improve or have worsening of condition. Take medication as prescribed. Drink plenty of fluids and rest.  Practice good hand hygiene. May sleep with humidifier as needed. Gargle with warm salt water 3-4 times a day to help with sore throat. You can use ice, warm chicken noodle soup, soft foods, popsicles and cough drops to help soothe your throat. May take Tylenol/Ibuprofen (alternate) as needed for fever/pain. If COVID Positive:    Currently, the CDC recommends staying at home in self isolation for at least 5 days. After that, if you are without a fever and symptoms are improving, you may go out, but only if wearing a mask for an additional 5 days. Thankfully, most people recover uneventfully. The mainstay of treatment for most people remains focused on symptom control, isolating and watching for signs of worsening illness. You should drink plenty of fluids, eat when you are able, and rest as much as possible. Recommend treating symptoms with OTC medications: Flonase for congestions, Mucinex for Phlegm, Robitussin DM or Delsym for cough, Tylenol/Ibuprofen for fever/body aches. Recommend Vitamin D, C and Zinc.    We do not prescribe antibiotics for viral illnesses; however, we can treat secondary infections should the need arise. Please seek more urgent medical attention if you develop any of the following:  Chest pain  Shortness of breath  Bluish lips or face  Severe headache   Severe dizziness or passing out  New confusion  Inability to stay awake  Extreme weakness    If you have a pulse oximeter, check it at least daily. Seek urgent medical attention if it drops to 92% or below. Alert and oriented to person, place, time/situation. normal mood and affect. no apparent risk to self or others.

## 2023-11-30 NOTE — ED ADULT NURSE NOTE - HOW OFTEN DO YOU HAVE A DRINK CONTAINING ALCOHOL?
28w3d  Problem   Fetal Arrhythmia Affecting Pregnancy, Antepartum    11/30/23: Regular rate. If present at next visit will discuss with MFM.      Advanced Maternal Age, Primigravida, Antepartum    32 week growth US  Aspirin 81mg daily starting at 12 weeks    LMP consistent with 1st trimester US  It's a SURPRISE!  : [place]  Blood type: B+  Genetic screening:  Cell free DNA testing WNL  Alpha fetal protein: declines  Flu vaccine: 10/12/23  Tdap vaccine: 11/30/23  RSV vaccine: information given  [place] Screen:  · 11/30/23: 5  Ultrasounds:  · 10/6/23 (20w4d): anterior placenta, 331 gm. 0 lb 12 oz 25 %, suboptimal anatomy  · 11/3/23 (24w4d): 753 gm. 1 lb 11 oz 56 %, normal anatomy  ·   Group B Streptococcus:  Birthing Classes:   · Birthing class: scheduled  · Breast feeding: scheduled  · Bringing home baby: scheduled  · CPR: scheduled  Birth Plan: Given, to be reviewed  · Continuous lumbar epidural: yes  · Breast feeding: yes  Contraception: undecided, reviewed all options     Elevated Tsh    7/13/23: TSH 4.467. Synthroid 25mcg daily, repeat level in 4 weeks with microsomal antibody  8/25/23: She started Synthroid 8/23/23 (due to nausea and vomiting), will repeat level in 4 weeks.   10/9/23: TSH 1.908. Synthroid 25mcg daily. Repeat level in 8 weeks.   11/30/23: TSH     Back Pain Complicating Pregnancy (Resolved)    11/1/23: numbness of left out thigh, information for chiropractor given.             
Never

## 2024-01-19 ENCOUNTER — EMERGENCY (EMERGENCY)
Facility: HOSPITAL | Age: 79
LOS: 1 days | Discharge: HOME | End: 2024-01-19
Attending: EMERGENCY MEDICINE
Payer: MEDICARE

## 2024-01-19 VITALS
DIASTOLIC BLOOD PRESSURE: 94 MMHG | HEIGHT: 63 IN | WEIGHT: 181.88 LBS | SYSTOLIC BLOOD PRESSURE: 162 MMHG | HEART RATE: 86 BPM | OXYGEN SATURATION: 98 % | RESPIRATION RATE: 18 BRPM | TEMPERATURE: 98 F

## 2024-01-19 PROCEDURE — 70450 CT HEAD/BRAIN W/O DYE: CPT | Mod: MA

## 2024-01-19 PROCEDURE — 99284 EMERGENCY DEPT VISIT MOD MDM: CPT | Mod: 25

## 2024-01-19 PROCEDURE — 99284 EMERGENCY DEPT VISIT MOD MDM: CPT

## 2024-01-19 PROCEDURE — 73660 X-RAY EXAM OF TOE(S): CPT

## 2024-01-19 PROCEDURE — 70450 CT HEAD/BRAIN W/O DYE: CPT | Mod: 26,MA

## 2024-01-19 RX ORDER — ACETAMINOPHEN 500 MG
650 TABLET ORAL ONCE
Refills: 0 | Status: COMPLETED | OUTPATIENT
Start: 2024-01-19 | End: 2024-01-19

## 2024-01-19 RX ADMIN — Medication 650 MILLIGRAM(S): at 22:16

## 2024-01-19 NOTE — ED PROVIDER NOTE - NSFOLLOWUPINSTRUCTIONS_ED_ALL_ED_FT
Head Injury, Adult       There are many types of head injuries. Head injuries can be as minor as a small bump, or they can be a serious medical issue. More severe head injuries include:    A jarring injury to the brain (concussion).  A bruise (contusion) of the brain. This means there is bleeding in the brain that can cause swelling.  A cracked skull (skull fracture).  Bleeding in the brain that collects, clots, and forms a bump (hematoma).    After a head injury, most problems occur within the first 24 hours, but side effects may occur up to 7–10 days after the injury. It is important to watch your condition for any changes. You may need to be observed in the emergency department or urgent care, or you may be admitted to the hospital.    What are the causes?  There are many possible causes of a head injury. Serious head injuries may be caused by car accidents, bicycle or motorcycle accidents, sports injuries, falls, or being struck by an object.    What are the symptoms?  Symptoms of a head injury include a contusion, bump, or bleeding at the site of the injury. Other physical symptoms may include:    Headache.  Nausea or vomiting.  Dizziness.  Blurred or double vision.  Being uncomfortable around bright lights or loud noises.  Seizures.  Feeling tired.  Trouble being awakened.  Loss of consciousness.    Mental or emotional symptoms may include:    Irritability.  Confusion and memory problems.  Poor attention and concentration.  Changes in eating or sleeping habits.  Anxiety or depression.    How is this diagnosed?  This condition can usually be diagnosed based on your symptoms, a description of the injury, and a physical exam. You may also have imaging tests done, such as a CT scan or an MRI.    How is this treated?  Treatment for this condition depends on the severity and type of injury you have. The main goal of treatment is to prevent complications and allow the brain time to heal.        Mild head injury    If you have a mild head injury, you may be sent home, and treatment may include:    Observation. A responsible adult should stay with you for 24 hours after your injury and check on you often.  Physical rest.  Brain rest.  Pain medicines.        Severe head injury    If you have a severe head injury, treatment may include:    Close observation. This includes hospitalization with the following care:    Frequent physical exams.  Frequent checks of how your brain and nervous system are working (neurological status).  Checking your blood pressure and oxygen levels.  Medicines to relieve pain, prevent seizures, and decrease brain swelling.  Airway protection and breathing support. This may include using a ventilator.  Treatments that monitor and manage swelling inside the brain.  Brain surgery. This may be needed to:    Remove a collection of blood or blood clots.  Stop the bleeding.  Remove a part of the skull to allow room for the brain to swell.    Follow these instructions at home:      Activity    Rest and avoid activities that are physically hard or tiring.  Make sure you get enough sleep.  Let your brain rest by limiting activities that require a lot of thought or attention, such as:    Watching TV.  Playing memory games and puzzles.  Job-related work or homework.  Working on the computer, using social media, and texting.  Avoid activities that could cause another head injury, such as playing sports, until your health care provider approves. Having another head injury, especially before the first one has healed, can be dangerous.  Ask your health care provider when it is safe for you to return to your regular activities, including work or school. Ask your health care provider for a step-by-step plan for gradually returning to activities.  Ask your health care provider when you can drive, ride a bicycle, or use heavy machinery. Your ability to react may be slower after a brain injury. Do not do these activities if you are dizzy.        Lifestyle     Do not drink alcohol until your health care provider approves. Do not use drugs. Alcohol and certain drugs may slow your recovery and can put you at risk of further injury.  If it is harder than usual to remember things, write them down.  If you are easily distracted, try to do one thing at a time.  Talk with family members or close friends when making important decisions.  Tell your friends, family, a trusted colleague, and  about your injury, symptoms, and restrictions. Have them watch for any new or worsening problems.        General instructions    Take over-the-counter and prescription medicines only as told by your health care provider.  Have someone stay with you for 24 hours after your head injury. This person should watch you for any changes in your symptoms and be ready to seek medical help.  Keep all follow-up visits as told by your health care provider. This is important.    How is this prevented?  Work on improving your balance and strength to avoid falls.  Wear a seat belt when you are in a moving vehicle.  Wear a helmet when riding a bicycle, skiing, or doing any other sport or activity that has a risk of injury.  If you drink alcohol:    Limit how much you use to:    0–1 drink a day for nonpregnant women.  0–2 drinks a day for men.  Be aware of how much alcohol is in your drink. In the [place] one drink equals one 12 oz bottle of beer (355 mL), one 5 oz glass of wine (148 mL), or one 1½ oz glass of hard liquor (44 mL).  Take safety measures in your home, such as:    Removing clutter and tripping hazards from floors and stairways.  Using grab bars in bathrooms and handrails by stairs.  Placing non-slip mats on floors and in bathtubs.  Improving lighting in dim areas.    Where to find more information  Centers for Disease Control and Prevention: www.cdc.gov    Get help right away if:  You have:    A severe headache that is not helped by medicine.  Trouble walking or weakness in your arms and legs.  Clear or bloody fluid coming from your nose or ears.  Changes in your vision.  A seizure.  Increased confusion or irritability.  Your symptoms get worse.  You are sleepier than normal and have trouble staying awake.  You lose your balance.  Your pupils change size.  Your speech is slurred.  Your dizziness gets worse.  You vomit.    These symptoms may represent a serious problem that is an emergency. Do not wait to see if the symptoms will go away. Get medical help right away. Call your local emergency services (911 in the [place] Do not drive yourself to the hospital.    Summary  Head injuries can be minor, or they can be a serious medical issue requiring immediate attention.  Treatment for this condition depends on the severity and type of injury you have.  Have someone stay with you for 24 hours after your injury and check on you often.  Ask your health care provider when it is safe for you to return to your regular activities, including work or school.  Head injury prevention includes wearing a seat belt in a motor vehicle, using a helmet on a bicycle, limiting alcohol use, and taking safety measures in your home.

## 2024-01-19 NOTE — ED PROVIDER NOTE - PROGRESS NOTE DETAILS
Floyd-: pt received at sign-out, seen and evaluated at bedside.  Pt sitting comfortably in NAD. Signout pending XR foot. XR foot neg per my wet read, pt ambulatory without assistance. Will DC with PMD/podiatry follow up, return precautions discussed, pt understood and agreeable with plan.

## 2024-01-19 NOTE — ED ADULT TRIAGE NOTE - AS HEIGHT TYPE
[de-identified] : Patient is well nourished, well-developed, in no acute distress, with appropriate mood and affect. The patient is oriented to time, place, and person. Respirations are even and unlabored. Gait evaluation does not reveal a limp. There is no inguinal adenopathy. Examination of the contralateral knee shows normal range of motion, strength, no tenderness, and intact skin. The operative limb is well-perfused, has a well appearing surgical scar except for the distal third of the wound has a 1 cm evidence of delayed wound healing.  This was probed with silver nitrate sticks and found to not probe deep.  It was packed with gauze.  Fibrinous tissue was debrided.  No surrounding erythema.  No fluctuance.  No exudate., and shows a grossly normal motor and sensory examination. Knee motion is painless and the right knee moves from 0 to 125 degrees. The knee is stable within that range-of-motion to AP and ML stress. The alignment of the knee is neutral. Muscle strength is normal. Quadriceps and hamstring muscle strength is normal bilaterally. Pedal pulses are palpable. 
stated

## 2024-01-19 NOTE — ED PROVIDER NOTE - OBJECTIVE STATEMENT
78 yr old female with no hx presents to ed for frontal hematoma with left 1st toe pain s/p trip and fall today. pt tripped on wire and hit head against the floor. no loc. no ac use. got up and now having pain at 1st left toe. no visual changes, no focal weakness, no nv, no alcohol use.

## 2024-01-19 NOTE — ED PROVIDER NOTE - CLINICAL SUMMARY MEDICAL DECISION MAKING FREE TEXT BOX
78 yr old female with no hx presents to ed for frontal hematoma with left 1st toe pain s/p trip and fall today. pt tripped on wire and hit head against the floor. no loc. no ac use. got up and now having pain at 1st left toe. no visual changes, no focal weakness, no nv, no alcohol use.    toe pain likely strain r/o fx. xr and cth for frontal hematoma. tyleno lfor pain. ice, extra pillow and rest.

## 2024-01-19 NOTE — ED ADULT TRIAGE NOTE - CHIEF COMPLAINT QUOTE
Patient states " my foot got tangled up in the wires,  I fell around 4:00 pm today, and hit my head, I have a bump in my forehead." Patient denies LOC, not on blood thinner.

## 2024-01-19 NOTE — ED PROVIDER NOTE - MUSCULOSKELETAL, MLM
Spine appears normal, range of motion is not limited, no muscle or joint tenderness. mild left 1st toe pain, no deformity

## 2024-01-19 NOTE — ED PROVIDER NOTE - PATIENT PORTAL LINK FT
You can access the FollowMyHealth Patient Portal offered by [facility] by registering at the following website: [email or web address]. By joining [employer]’s FollowMyHealth portal, you will also be able to view your health information using other applications (apps) compatible with our system.

## 2024-01-19 NOTE — ED PROVIDER NOTE - NSFOLLOWUPCLINICS_GEN_ALL_ED_FT
[facility]  Podiatry/Wound Care  [address] [place] [address]  Phone: [phone]  Fax: [phone]

## 2024-01-20 VITALS
RESPIRATION RATE: 18 BRPM | OXYGEN SATURATION: 97 % | TEMPERATURE: 98 F | SYSTOLIC BLOOD PRESSURE: 126 MMHG | DIASTOLIC BLOOD PRESSURE: 71 MMHG | HEART RATE: 63 BPM

## 2024-01-20 PROCEDURE — 73660 X-RAY EXAM OF TOE(S): CPT | Mod: 26,LT

## 2024-01-20 NOTE — ED ADULT NURSE NOTE - NSFALLUNIVINTERV_ED_ALL_ED
Bed/Stretcher in lowest position, wheels locked, appropriate side rails in place/Call bell, personal items and telephone in reach/Instruct patient to call for assistance before getting out of bed/chair/stretcher/Non-slip footwear applied when patient is off stretcher/[place] to call system/Physically safe environment - no spills, clutter or unnecessary equipment/Purposeful proactive rounding/Room/bathroom lighting operational, light cord in reach

## 2024-02-07 NOTE — ED PROVIDER NOTE - NSICDXNOPASTSURGICALHX_GEN_ALL_ED
Scribe Attestation (For Scribes USE Only)... <-- Click to add NO significant Past Surgical History Attending Attestation (For Attendings USE Only).../Scribe Attestation (For Scribes USE Only)...

## 2024-02-08 NOTE — ED ADULT NURSE NOTE - NSFALLRSKHARMRISK_ED_ALL_ED
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  CT A/P "eccentric irregular wall thickening of the mid gastric body concerning for malignancy."  Started on diet this afternoon, monitor overnight, AM labs    2/9/24  NAEON, H/H remains stable, tolerating diet w/o issues  Last BM yesterday, reports dark/tarry  Pathology results pending  Advised to stop Xarelto concern for re-bleeding and anemia  Hx of LLE DVT - likely provoked secondary to malignancy in Oct 2022  Ambulatory referral to Oncology  F/U with PCP in 1-2 weeks  Stable for discharge home    " no

## 2024-02-16 ENCOUNTER — INPATIENT (INPATIENT)
Facility: HOSPITAL | Age: 79
LOS: 3 days | Discharge: HOME | End: 2024-02-20
Attending: INTERNAL MEDICINE | Admitting: INTERNAL MEDICINE
Payer: MEDICARE

## 2024-02-16 VITALS
OXYGEN SATURATION: 98 % | HEART RATE: 76 BPM | TEMPERATURE: 99 F | RESPIRATION RATE: 16 BRPM | HEIGHT: 63 IN | DIASTOLIC BLOOD PRESSURE: 90 MMHG | SYSTOLIC BLOOD PRESSURE: 157 MMHG

## 2024-02-16 DIAGNOSIS — I10 ESSENTIAL (PRIMARY) HYPERTENSION: ICD-10-CM

## 2024-02-16 DIAGNOSIS — R07.89 OTHER CHEST PAIN: ICD-10-CM

## 2024-02-16 DIAGNOSIS — R55 SYNCOPE AND COLLAPSE: ICD-10-CM

## 2024-02-16 DIAGNOSIS — Z29.9 ENCOUNTER FOR PROPHYLACTIC MEASURES, UNSPECIFIED: ICD-10-CM

## 2024-02-16 LAB
ALBUMIN SERPL ELPH-MCNC: 4.1 G/DL — SIGNIFICANT CHANGE UP (ref 3.3–5)
ALP SERPL-CCNC: 70 U/L — SIGNIFICANT CHANGE UP (ref 40–120)
ALT FLD-CCNC: 6 U/L — SIGNIFICANT CHANGE UP (ref 4–33)
ANION GAP SERPL CALC-SCNC: 11 MMOL/L — SIGNIFICANT CHANGE UP (ref 7–14)
APPEARANCE UR: CLEAR — SIGNIFICANT CHANGE UP
AST SERPL-CCNC: 13 U/L — SIGNIFICANT CHANGE UP (ref 4–32)
BACTERIA # UR AUTO: ABNORMAL /HPF
BASOPHILS # BLD AUTO: 0.03 K/UL — SIGNIFICANT CHANGE UP (ref 0–0.2)
BASOPHILS NFR BLD AUTO: 0.6 % — SIGNIFICANT CHANGE UP (ref 0–2)
BILIRUB SERPL-MCNC: 0.4 MG/DL — SIGNIFICANT CHANGE UP (ref 0.2–1.2)
BILIRUB UR-MCNC: NEGATIVE — SIGNIFICANT CHANGE UP
BUN SERPL-MCNC: 15 MG/DL — SIGNIFICANT CHANGE UP (ref 7–23)
CALCIUM SERPL-MCNC: 9.4 MG/DL — SIGNIFICANT CHANGE UP (ref 8.4–10.5)
CAST: 0 /LPF — SIGNIFICANT CHANGE UP (ref 0–4)
CHLORIDE SERPL-SCNC: 105 MMOL/L — SIGNIFICANT CHANGE UP (ref 98–107)
CO2 SERPL-SCNC: 26 MMOL/L — SIGNIFICANT CHANGE UP (ref 22–31)
COLOR SPEC: YELLOW — SIGNIFICANT CHANGE UP
CREAT SERPL-MCNC: 0.87 MG/DL — SIGNIFICANT CHANGE UP (ref 0.5–1.3)
DIFF PNL FLD: NEGATIVE — SIGNIFICANT CHANGE UP
EGFR: 69 ML/MIN/1.73M2 — SIGNIFICANT CHANGE UP
EOSINOPHIL # BLD AUTO: 0.11 K/UL — SIGNIFICANT CHANGE UP (ref 0–0.5)
EOSINOPHIL NFR BLD AUTO: 2.3 % — SIGNIFICANT CHANGE UP (ref 0–6)
FLUAV AG NPH QL: SIGNIFICANT CHANGE UP
FLUBV AG NPH QL: SIGNIFICANT CHANGE UP
GLUCOSE SERPL-MCNC: 89 MG/DL — SIGNIFICANT CHANGE UP (ref 70–99)
GLUCOSE UR QL: NEGATIVE MG/DL — SIGNIFICANT CHANGE UP
HCT VFR BLD CALC: 37.8 % — SIGNIFICANT CHANGE UP (ref 34.5–45)
HGB BLD-MCNC: 12.7 G/DL — SIGNIFICANT CHANGE UP (ref 11.5–15.5)
IANC: 3.61 K/UL — SIGNIFICANT CHANGE UP (ref 1.8–7.4)
IMM GRANULOCYTES NFR BLD AUTO: 0.2 % — SIGNIFICANT CHANGE UP (ref 0–0.9)
KETONES UR-MCNC: NEGATIVE MG/DL — SIGNIFICANT CHANGE UP
LEUKOCYTE ESTERASE UR-ACNC: NEGATIVE — SIGNIFICANT CHANGE UP
LYMPHOCYTES # BLD AUTO: 0.76 K/UL — LOW (ref 1–3.3)
LYMPHOCYTES # BLD AUTO: 15.7 % — SIGNIFICANT CHANGE UP (ref 13–44)
MCHC RBC-ENTMCNC: 30.3 PG — SIGNIFICANT CHANGE UP (ref 27–34)
MCHC RBC-ENTMCNC: 33.6 GM/DL — SIGNIFICANT CHANGE UP (ref 32–36)
MCV RBC AUTO: 90.2 FL — SIGNIFICANT CHANGE UP (ref 80–100)
MONOCYTES # BLD AUTO: 0.33 K/UL — SIGNIFICANT CHANGE UP (ref 0–0.9)
MONOCYTES NFR BLD AUTO: 6.8 % — SIGNIFICANT CHANGE UP (ref 2–14)
NEUTROPHILS # BLD AUTO: 3.61 K/UL — SIGNIFICANT CHANGE UP (ref 1.8–7.4)
NEUTROPHILS NFR BLD AUTO: 74.4 % — SIGNIFICANT CHANGE UP (ref 43–77)
NITRITE UR-MCNC: NEGATIVE — SIGNIFICANT CHANGE UP
NRBC # BLD: 0 /100 WBCS — SIGNIFICANT CHANGE UP (ref 0–0)
NRBC # FLD: 0 K/UL — SIGNIFICANT CHANGE UP (ref 0–0)
PH UR: 6.5 — SIGNIFICANT CHANGE UP (ref 5–8)
PLATELET # BLD AUTO: 333 K/UL — SIGNIFICANT CHANGE UP (ref 150–400)
POTASSIUM SERPL-MCNC: 4.1 MMOL/L — SIGNIFICANT CHANGE UP (ref 3.5–5.3)
POTASSIUM SERPL-SCNC: 4.1 MMOL/L — SIGNIFICANT CHANGE UP (ref 3.5–5.3)
PROT SERPL-MCNC: 7.8 G/DL — SIGNIFICANT CHANGE UP (ref 6–8.3)
PROT UR-MCNC: NEGATIVE MG/DL — SIGNIFICANT CHANGE UP
RBC # BLD: 4.19 M/UL — SIGNIFICANT CHANGE UP (ref 3.8–5.2)
RBC # FLD: 14.1 % — SIGNIFICANT CHANGE UP (ref 10.3–14.5)
RBC CASTS # UR COMP ASSIST: 1 /HPF — SIGNIFICANT CHANGE UP (ref 0–4)
RSV RNA NPH QL NAA+NON-PROBE: SIGNIFICANT CHANGE UP
SARS-COV-2 RNA SPEC QL NAA+PROBE: SIGNIFICANT CHANGE UP
SODIUM SERPL-SCNC: 142 MMOL/L — SIGNIFICANT CHANGE UP (ref 135–145)
SP GR SPEC: 1.02 — SIGNIFICANT CHANGE UP (ref 1–1.03)
SQUAMOUS # UR AUTO: 2 /HPF — SIGNIFICANT CHANGE UP (ref 0–5)
TROPONIN T, HIGH SENSITIVITY RESULT: 10 NG/L — SIGNIFICANT CHANGE UP
TROPONIN T, HIGH SENSITIVITY RESULT: 9 NG/L — SIGNIFICANT CHANGE UP
UROBILINOGEN FLD QL: 1 MG/DL — SIGNIFICANT CHANGE UP (ref 0.2–1)
WBC # BLD: 4.85 K/UL — SIGNIFICANT CHANGE UP (ref 3.8–10.5)
WBC # FLD AUTO: 4.85 K/UL — SIGNIFICANT CHANGE UP (ref 3.8–10.5)
WBC UR QL: 1 /HPF — SIGNIFICANT CHANGE UP (ref 0–5)

## 2024-02-16 PROCEDURE — 99223 1ST HOSP IP/OBS HIGH 75: CPT

## 2024-02-16 PROCEDURE — 99285 EMERGENCY DEPT VISIT HI MDM: CPT

## 2024-02-16 PROCEDURE — 71046 X-RAY EXAM CHEST 2 VIEWS: CPT | Mod: 26

## 2024-02-16 RX ORDER — ASPIRIN/CALCIUM CARB/MAGNESIUM 324 MG
162 TABLET ORAL ONCE
Refills: 0 | Status: COMPLETED | OUTPATIENT
Start: 2024-02-16 | End: 2024-02-16

## 2024-02-16 RX ORDER — ACETAMINOPHEN 500 MG
650 TABLET ORAL EVERY 6 HOURS
Refills: 0 | Status: DISCONTINUED | OUTPATIENT
Start: 2024-02-16 | End: 2024-02-20

## 2024-02-16 RX ORDER — ENOXAPARIN SODIUM 100 MG/ML
40 INJECTION SUBCUTANEOUS EVERY 24 HOURS
Refills: 0 | Status: DISCONTINUED | OUTPATIENT
Start: 2024-02-16 | End: 2024-02-20

## 2024-02-16 RX ORDER — FAMOTIDINE 10 MG/ML
20 INJECTION INTRAVENOUS
Refills: 0 | Status: DISCONTINUED | OUTPATIENT
Start: 2024-02-16 | End: 2024-02-16

## 2024-02-16 RX ORDER — ONDANSETRON 8 MG/1
4 TABLET, FILM COATED ORAL EVERY 8 HOURS
Refills: 0 | Status: DISCONTINUED | OUTPATIENT
Start: 2024-02-16 | End: 2024-02-20

## 2024-02-16 RX ORDER — FAMOTIDINE 10 MG/ML
20 INJECTION INTRAVENOUS ONCE
Refills: 0 | Status: COMPLETED | OUTPATIENT
Start: 2024-02-16 | End: 2024-02-16

## 2024-02-16 RX ORDER — FAMOTIDINE 10 MG/ML
20 INJECTION INTRAVENOUS DAILY
Refills: 0 | Status: DISCONTINUED | OUTPATIENT
Start: 2024-02-16 | End: 2024-02-20

## 2024-02-16 RX ORDER — LANOLIN ALCOHOL/MO/W.PET/CERES
3 CREAM (GRAM) TOPICAL AT BEDTIME
Refills: 0 | Status: DISCONTINUED | OUTPATIENT
Start: 2024-02-16 | End: 2024-02-20

## 2024-02-16 RX ADMIN — FAMOTIDINE 20 MILLIGRAM(S): 10 INJECTION INTRAVENOUS at 13:58

## 2024-02-16 RX ADMIN — Medication 30 MILLILITER(S): at 14:00

## 2024-02-16 RX ADMIN — Medication 162 MILLIGRAM(S): at 14:00

## 2024-02-16 NOTE — H&P ADULT - ASSESSMENT
77 y/o F PMHx diverticulosis, HTN not on medication presents c/o chest heaviness/pressure and an episode of presyncope admitted for further eval

## 2024-02-16 NOTE — H&P ADULT - NSHPLABSRESULTS_GEN_ALL_CORE
( @ 14:01)                      12.7  4.85 )-----------( 333                 37.8    Neutrophils = 3.61 (74.4%)  Lymphocytes = 0.76 (15.7%)  Eosinophils = 0.11 (2.3%)  Basophils = 0.03 (0.6%)  Monocytes = 0.33 (6.8%)  Bands = --%        142  |  105  |  15  ----------------------------<  89  4.1   |  26  |  0.87    Ca    9.4      2024 14:01    TPro  7.8  /  Alb  4.1  /  TBili  0.4  /  DBili  x   /  AST  13  /  ALT  6   /  AlkPhos  70        Urinalysis Basic - ( 2024 17:23 )    Color: Yellow / Appearance: Clear / S.016 / pH: x  Gluc: x / Ketone: Negative mg/dL  / Bili: Negative / Urobili: 1.0 mg/dL   Blood: x / Protein: Negative mg/dL / Nitrite: Negative   Leuk Esterase: Negative / RBC: 1 /HPF / WBC 1 /HPF   Sq Epi: x / Non Sq Epi: 2 /HPF / Bacteria: Occasional /HPF    < from: Xray Chest 2 Views PA/Lat (24 @ 15:09) >    IMPRESSION:    No radiographic evidence of acute cardiopulmonary disease.    < end of copied text >        Labs personally reviewed  Imaging reviewed  EKG: NSR with 1st degree AV block, no acute ST changes

## 2024-02-16 NOTE — ED PROVIDER NOTE - OBJECTIVE STATEMENT
Patient is a 79 y/o F PMHx diverticulosis, htn not on medication presents c/o "chest heaviness" and an episode of presyncope earlier today. Patient states she went to her PCP this morning for substernal chest pressure that radiates into her throat. Denies palpitations, SOB.   Went to see her PCP for the above complaint today and while in the office became very light headed but did not pass out. Notes she did not eat anything prior to her pcp appointment or since that today. and noted that she has been "very anxious about the news I've been seeing on TV" and feels this may have contributed. States she has gotten these episodes many times in the past. Notes she hit fell and his her head ad was seen at [facility] for this issue and had a negative CT head but continued to have a mild frontal HA intermittently since then and still has a small bump on her forehead. Has seen a cardiologist in the past but not for several years.   Patient denies any fevers, chills, nausea, vomiting, diarrhea, constipation, painful urination, new rashes.

## 2024-02-16 NOTE — H&P ADULT - PROBLEM SELECTOR PLAN 2
-pt reports epigastric/chest pressure, worse with exertion. None at present  -Trops stable, EKG no acute ischemic changes. Low c/f ACS  -TTE and stress testing pending  -ddx includes GERD given associated reflux symptoms   -start on famotidine with Maalox prn

## 2024-02-16 NOTE — ED PROVIDER NOTE - PHYSICAL EXAMINATION
General: well appearing, interactive, well nourished, no apparent distress  HEENT: Small well healing hematoma over L frontal bone. EOMI, PERRLA, normal mucosa, normal oropharynx, no lesions on the lips or on oral mucosa, normal external ear  Neck: supple, no lymphadenopathy, full range of motion, no nuchal rigidity  CV: RRR, normal S1 and S2 with no murmur, capillary refill less than two seconds  Resp: lungs CTA b/l, good aeration bilaterally, symmetric chest wall   Abd: non-distended, soft, non-tender  : no CVA tenderness  MSK: full range of motion, no cyanosis, no edema, no clubbing, no immobility  Neuro: CN II-XII grossly intact, muscle strength 5/5 in all extremities, normal gait  Skin: no rashes, skin intact

## 2024-02-16 NOTE — ED PROVIDER NOTE - ATTENDING CONTRIBUTION TO CARE
78F with h/o diverticulitis p/w chest pressure since this morning. Also had pre-syncope episode at a PCP visit today, so was referred to the ED. Pt has been experiencing intermittent lightheadedness. Denies SOB, cough, fever, chills, syncope, trauma/injury or other complaints. On exam, EKG is unremarkable. Abd is soft and non-tender. No BLE swelling. No focal neuro deficits.    DDX/PLAN: Ddx includes ACS, PNA, anemia, electrolyte abnormalities, GERD/PUD, occult infection such as UTI or viral illness. Less likely PE, dissection, PTX. or acute CVA. Will obtain serial troponin, EKG, CXR, urine studies, Head CT. Will treat pain as needed. Dispo pending workup.

## 2024-02-16 NOTE — ED ADULT NURSE REASSESSMENT NOTE - NS ED NURSE REASSESS COMMENT FT1
Pt resting comfortably in spot 12a. Pt endorses partial relief of pressure. Airway is patent, respirations are even and unlabored. Plan of care ongoing, safety maintained.

## 2024-02-16 NOTE — ED ADULT NURSE REASSESSMENT NOTE - NS ED NURSE REASSESS COMMENT FT1
Pt resting comfortably in spot 12a. Pt talking to admitting team at bedside. Pt not in acute distress. Pt normal sinus on the cardiac monitor. Airway is patent, respirations are even and unlabored. Plan of care ongoing, safety maintained.

## 2024-02-16 NOTE — H&P ADULT - HISTORY OF PRESENT ILLNESS
79 y/o F PMHx diverticulosis, HTN not on medication presents c/o chest heaviness/pressure and an episode of presyncope earlier today. Patient states she has been having intermittent episodes of epigastric/chest pressure worse with activity. Today the pressure intensified prompting her to present to PCP this morning for evaluation. While she was in the office, she had an episode where she became lightheaded while sitting. She is unsure if she passed out but she is unable to remember the full event. She reports associated shallow breathing but denies SOB, denies N/V or chest pain. She does admit to not eating anything prior to the PCP visit. She also reports several other episodes of lightheadedness. Denies any association with changes in position. Last was a few days prior while she was at a . She reports feeling hot and diaphoretic and like she was going to pass out. She also had a fall mid January and cannot remember the details of how she ended up on the ground. She was seen at [facility] and had a head CT with only a frontal scalp hematoma. Since the fall, she reports numbness on the top of her head. Otherwise no visual changes, headache, focal weakness or numbness. She reports a long history (20+ years) of feeling lightheaded sometimes, she notes episodes while she is in the shower. She reports having extensive workup as outpt including stress test, echos, MRIs of the head that were all unrevealing. Currently denies chest pressure/pain or lightheadedness. She reports burning in the throat and reflux symptoms. She denies fevers, chills, SOB, LE edema.

## 2024-02-16 NOTE — ED ADULT TRIAGE NOTE - CHIEF COMPLAINT QUOTE
Pt presents from MD office for episode of syncope while sitting at office. Pt endorses pressure to chest. Pt denies shortness of breath, no headache/dizziness, afebrile, no past medical history.

## 2024-02-16 NOTE — H&P ADULT - NSHPREVIEWOFSYSTEMS_GEN_ALL_CORE
CONSTITUTIONAL:  No weight loss, fever, chills, weakness or fatigue.  HEENT:  Eyes:  No visual loss, blurred vision, double vision or yellow sclerae. Ears, Nose, Throat:  No hearing loss, sneezing, congestion, runny nose or sore throat.  SKIN:  No rash or itching.  CARDIOVASCULAR: +chest pressure No palpitations or edema.  RESPIRATORY:  No shortness of breath, cough or sputum.  GASTROINTESTINAL:  +anorexia +epigastric pressure +reflux symptoms No nausea, vomiting or diarrhea. No blood.  GENITOURINARY:  Denies hematuria, dysuria.   NEUROLOGICAL: +lightheadedness +pre-syncope No headache, paralysis, ataxia, numbness or tingling in the extremities. No change in bowel or bladder control.  MUSCULOSKELETAL:  No muscle, back pain, joint pain or stiffness.  PSYCHIATRIC:  +anxiety No history of depression  ENDOCRINOLOGIC:  No reports of sweating, cold or heat intolerance. No polyuria or polydipsia.  ALLERGIES:  No history of asthma, hives, eczema or rhinitis.

## 2024-02-16 NOTE — ED PROVIDER NOTE - PROGRESS NOTE DETAILS
Alfonso Logan DO (PGY-1) Trop delta <10%. No active CP at this time however iso HEART score moderate risk will admit patient to teledoc after lengthy discussion about pros/cons patient amenable to staying. Denton Calix MD (PGY-3) admission for high risk syncope, exertional pre-syncope. pt agreeable w/ admission.

## 2024-02-16 NOTE — ED ADULT NURSE NOTE - OBJECTIVE STATEMENT
Pt arrives to spot 12a. Pt is A and Ox4 and ambulatory with a walker. Hx: diverticulitis. Pt arrives to the ED complaining of chest pressure. Pt endorses that this has been going on for a few months everyday and she, "ramires through it". Pt endorses that the pressure gets so bad she has had two syncopal episodes. Pt denies LOC, head strike, blood thinner use. Pt fell in January and has a visible bump on the right side of her head. Pt states that her "equilibrium" has been off for the past few months. Airway is patent, respirations are even and unlabored. Pt normal sinus on the cardiac monitor. Awaiting provider orders. Plan of care ongoing, safety maintained.

## 2024-02-16 NOTE — ED PROVIDER NOTE - ADMIT DISPOSITION PRESENT ON ADMISSION SEPSIS
No

## 2024-02-16 NOTE — ED PROVIDER NOTE - CLINICAL SUMMARY MEDICAL DECISION MAKING FREE TEXT BOX
Patient is a 79 y/o F PMHx diverticulosis, htn not on medication presents c/o "chest heaviness" and an episode of presyncope earlier today. Patient states she went to her PCP this morning for substernal chest pressure that radiates into her throat. Denies palpitations, SOB.   Went to see her PCP for the above complaint today and while in the office became very light headed but did not pass out. Notes she did not eat anything prior to her pcp appointment or since that today. and noted that she has been "very anxious about the news I've been seeing on TV" and feels this may have contributed. States she has gotten these episodes many times in the past. Notes she hit fell and his her head ad was seen at [facility] for this issue and had a negative CT head but continued to have a mild frontal HA intermittently since then and still has a small bump on her forehead. Has seen a cardiologist in the past but not for several years.   VSS  On exam well appearing., no M/R/G. Neuro exam WNL  DDx- HEAR score moderate risk for acute cardiac event. Will obtain trop/ekg/schxr

## 2024-02-16 NOTE — H&P ADULT - PROBLEM SELECTOR PLAN 1
-Pt reports multiple episode of lightheadedness, pre-syncope, unclear if full syncopal episodes as pt not a clear historian  -No focal neuro deficits on exam. Last CT head 1 month ago unrevealing except for scalp hematoma. Lower c/f neuro etiology of symptoms at this time so will defer repeating   -r/o cardiac etiology. Trops stable, EKG no acute ischemic changes. Low c/f ACS.   -check TTE, stress test  -monitor on tele for arrythmia, may need holter monitor   -check orthostatics   -if all neg, can consider baroreceptor sensitivity, tilt table test as outpt

## 2024-02-16 NOTE — H&P ADULT - NSHPPHYSICALEXAM_GEN_ALL_CORE
GENERAL APPEARANCE: Well developed, alert and cooperative. NAD.   HEENT:  PERRL, EOMI. External auditory canals normal, hearing grossly intact.  NECK: Neck supple, non-tender   CARDIAC: Normal S1 and S2. No S3, S4 or murmurs. Rhythm is regular.  LUNGS: Clear to auscultation and percussion without rales, rhonchi, wheezing or diminished breath sounds.  ABDOMEN: Positive bowel sounds. Soft, epigastric tenderness, nontender. No guarding or rebound.   MUSCULOSKELETAL: ROM intact spine and extremities. No joint erythema or tenderness.   EXTREMITIES: No significant deformity or joint abnormality. No edema. Peripheral pulses intact.   NEUROLOGICAL: CN II-XII intact. Strength and sensation symmetric and intact throughout.   SKIN: Skin normal color, texture and turgor with no lesions or eruptions.  PSYCHIATRIC: AOx3. Anxious affect

## 2024-02-16 NOTE — ED ADULT NURSE NOTE - NSFALLRISKINTERV_ED_ALL_ED
Assistance with ambulation/Communicate fall risk and risk factors to all staff, patient, and family/Encourage patient to sit up slowly, dangle for a short time, stand at bedside before walking/Monitor gait and stability/Orthostatic vital signs/Provide patient with walking aids/Provide visual cue: yellow wristband, yellow gown, etc/Reinforce activity limits and safety measures with patient and family/Call bell, personal items and telephone in reach/Instruct patient to call for assistance before getting out of bed/chair/stretcher/Non-slip footwear applied when patient is off stretcher/[place] to call system/Physically safe environment - no spills, clutter or unnecessary equipment/Purposeful Proactive Rounding/Room/bathroom lighting operational, light cord in reach

## 2024-02-17 RX ORDER — INFLUENZA VIRUS VACCINE 15; 15; 15; 15 UG/.5ML; UG/.5ML; UG/.5ML; UG/.5ML
0.7 SUSPENSION INTRAMUSCULAR ONCE
Refills: 0 | Status: DISCONTINUED | OUTPATIENT
Start: 2024-02-17 | End: 2024-02-20

## 2024-02-17 RX ORDER — SODIUM CHLORIDE 9 MG/ML
1000 INJECTION INTRAMUSCULAR; INTRAVENOUS; SUBCUTANEOUS
Refills: 0 | Status: DISCONTINUED | OUTPATIENT
Start: 2024-02-17 | End: 2024-02-20

## 2024-02-17 RX ADMIN — SODIUM CHLORIDE 100 MILLILITER(S): 9 INJECTION INTRAMUSCULAR; INTRAVENOUS; SUBCUTANEOUS at 10:10

## 2024-02-17 NOTE — CONSULT NOTE ADULT - SUBJECTIVE AND OBJECTIVE BOX
CARDIOLOGY CONSULT NOTE - DR. SIDDIQUI        Date of Service: 24 @ 10:10      HPI:  77 y/o F PMHx diverticulosis, HTN not on medication presents c/o chest heaviness/pressure and an episode of presyncope earlier today. Patient states she has been having intermittent episodes of epigastric/chest pressure worse with activity. Today the pressure intensified prompting her to present to PCP this morning for evaluation. While she was in the office, she had an episode where she became lightheaded while sitting. She is unsure if she passed out but she is unable to remember the full event. She reports associated shallow breathing but denies SOB, denies N/V or chest pain. She does admit to not eating anything prior to the PCP visit. She also reports several other episodes of lightheadedness. Denies any association with changes in position. Last was a few days prior while she was at a . She reports feeling hot and diaphoretic and like she was going to pass out. She also had a fall mid January and cannot remember the details of how she ended up on the ground. She was seen at [facility] and had a head CT with only a frontal scalp hematoma. Since the fall, she reports numbness on the top of her head. Otherwise no visual changes, headache, focal weakness or numbness. She reports a long history (20+ years) of feeling lightheaded sometimes, she notes episodes while she is in the shower. She reports having extensive workup as outpt including stress test, echos, MRIs of the head that were all unrevealing. Currently denies chest pressure/pain or lightheadedness. She reports burning in the throat and reflux symptoms. She denies fevers, chills, SOB, LE edema. (2024 21:22)    no active chest pain/dyspnea  some dizziness  no back pain, neck pain, vision abnl, focal neuro deficits          PAST MEDICAL & SURGICAL HISTORY:  Cataract      Diverticulitis      No significant past surgical history            PREVIOUS DIAGNOSTIC TESTING:    [ ] Echocardiogram:  [ ]  Catheterization:  [ ] Stress Test:  	    MEDICATIONS:    Home Medications:      MEDICATIONS  (STANDING):  enoxaparin Injectable 40 milliGRAM(s) SubCutaneous every 24 hours  famotidine    Tablet 20 milliGRAM(s) Oral daily  sodium chloride 0.9%. 1000 milliLiter(s) (100 mL/Hr) IV Continuous <Continuous>      FAMILY HISTORY:  FH: colon cancer (Sibling)        SOCIAL HISTORY:    [x ] Non-smoker  [ ] Smoker  [ ] Alcohol    Allergies    No Known Allergies    Intolerances    	    REVIEW OF SYSTEMS:  CONSTITUTIONAL: No fever, weight loss, or fatigue  EYES: No eye pain, visual disturbances, or discharge  ENMT:  No difficulty hearing, tinnitus, vertigo; No sinus or throat pain  NECK: No pain or stiffness  RESPIRATORY: No cough, wheezing, chills or hemoptysis; No Shortness of Breath  CARDIOVASCULAR: as HPI  GASTROINTESTINAL: No abdominal or epigastric pain. No nausea, vomiting, or hematemesis; No diarrhea or constipation. No melena or hematochezia.  GENITOURINARY: No dysuria, frequency, hematuria, or incontinence  NEUROLOGICAL: No headaches, memory loss, loss of strength, numbness, or tremors  SKIN: No itching, burning, rashes, or lesions   	  [ ] All others negative	  [ ] Unable to obtain    PHYSICAL EXAM:    T(C): 36.7 (24 @ 05:47), Max: 37.2 (24 @ 12:05)  HR: 67 (24 @ 05:47) (67 - 82)  BP: 139/72 (24 @ 05:47) (120/73 - 157/90)  RR: 15 (24 @ 05:47) (15 - 18)  SpO2: 99% (24 @ 05:47) (97% - 100%)  Wt(kg): --  I&O's Summary    Daily Height in cm: 160.02 (2024 12:05)    Daily     Appearance: Normal	  Psychiatry: A & O x 3, Mood & affect appropriate  HEENT:   Normal oral mucosa, PERRL, EOMI	  Lymphatic: No lymphadenopathy  Cardiovascular: Normal S1 S2,RRR, No JVD, No murmurs  Respiratory: Lungs clear to auscultation	  Gastrointestinal:  Soft, Non-tender, + BS	  Skin: No rashes, No ecchymoses, No cyanosis	  Neurologic: Non-focal  Extremities: Normal range of motion, No clubbing, cyanosis or edema  Vascular: Peripheral pulses palpable 2+ bilaterally    TELEMETRY: 	    ECG:  	sr no acute ischemic ecg abnl   RADIOLOGY:  OTHER: 	  	  LABS:	 	    CARDIAC MARKERS:        proBNP:     Lipid Profile:   HgA1c:   TSH:                           12.7   4.85  )-----------( 333      ( 2024 14:01 )             37.8     02-16    142  |  105  |  15  ----------------------------<  89  4.1   |  26  |  0.87    Ca    9.4      2024 14:01    TPro  7.8  /  Alb  4.1  /  TBili  0.4  /  DBili  x   /  AST  13  /  ALT  6   /  AlkPhos  70  -16        Creatinine: 0.87 mg/dL (24 @ 14:01)        ASSESSMENT/PLAN:

## 2024-02-17 NOTE — PATIENT PROFILE ADULT - FALL HARM RISK - HARM RISK INTERVENTIONS
Assistance with ambulation/Assistance OOB with selected safe patient handling equipment/Communicate Risk of Fall with Harm to all staff/Monitor gait and stability/Reinforce activity limits and safety measures with patient and family/Sit up slowly, dangle for a short time, stand at bedside before walking/Tailored Fall Risk Interventions/Visual Cue: Yellow wristband and red socks/Bed in lowest position, wheels locked, appropriate side rails in place/Call bell, personal items and telephone in reach/Instruct patient to call for assistance before getting out of bed or chair/Non-slip footwear when patient is out of bed/[place] to call system/Physically safe environment - no spills, clutter or unnecessary equipment/Purposeful Proactive Rounding/Room/bathroom lighting operational, light cord in reach

## 2024-02-17 NOTE — PHYSICAL THERAPY INITIAL EVALUATION ADULT - ADDITIONAL COMMENTS
Patient lives in an apartment with her , +elevator. Patient reports independence with ADLs and short term ambulation. Patient reports she pushes a shopping cart when ambulating long distances and has a rolling walker if needed. Reports one fall in January, does not remember how she fell but denies loss of consciousness.     Patient left sitting in bedside chair in ED, in no apparent distress, +lines intact. Vitals stable.

## 2024-02-17 NOTE — CONSULT NOTE ADULT - ASSESSMENT
A/P    79 y/o F PMHx diverticulosis, HTN not on medication presents c/o chest heaviness/pressure and an episode of presyncope admitted for further eval     #Pre-syncope, chest pain  -episodes in setting of preceding chest pain  -possibly related to ischemic heart disease  -head ct ok  -r/o ischemic heart disease  -check echo   -check nuclear stress  -trend orthostatics   -hydrate    #hypertension.   -monitor off meds for now     dvt ppx      79 minutes spent on total encounter; more than 50% of the visit was spent counseling and/or coordinating care by the attending physician.

## 2024-02-17 NOTE — PHYSICAL THERAPY INITIAL EVALUATION ADULT - PERTINENT HX OF CURRENT PROBLEM, REHAB EVAL
78 year old Female PMHx diverticulosis, hypertension not on medication presents with chest heaviness/pressure and an episode of presyncope admitted for further eval. CT Head pending.

## 2024-02-17 NOTE — PHYSICAL THERAPY INITIAL EVALUATION ADULT - GENERAL OBSERVATIONS, REHAB EVAL
Patient received sitting in bedside chair in no apparent distress in ED, +telemetry, agreeable to participate. HR 78 BPM. Patient received sitting in bedside chair in no apparent distress in ED, +telemetry, agreeable to participate. HR 78 BPM, /82.

## 2024-02-17 NOTE — CONSULT NOTE ADULT - SUBJECTIVE AND OBJECTIVE BOX
Patient is a 78y old  Female who presents with a chief complaint of lightheadedness, presyncope, chest pressure (2024 10:10)      HPI:  77 y/o F PMHx diverticulosis, HTN not on medication presents c/o chest heaviness/pressure and an episode of presyncope earlier today. Patient states she has been having intermittent episodes of epigastric/chest pressure worse with activity. Today the pressure intensified prompting her to present to PCP this morning for evaluation. While she was in the office, she had an episode where she became lightheaded while sitting. She is unsure if she passed out but she is unable to remember the full event. She reports associated shallow breathing but denies SOB, denies N/V or chest pain. She does admit to not eating anything prior to the PCP visit. She also reports several other episodes of lightheadedness. Denies any association with changes in position. Last was a few days prior while she was at a . She reports feeling hot and diaphoretic and like she was going to pass out. She also had a fall mid January and cannot remember the details of how she ended up on the ground. She was seen at [facility] and had a head CT with only a frontal scalp hematoma. Since the fall, she reports numbness on the top of her head. Otherwise no visual changes, headache, focal weakness or numbness. She reports a long history (20+ years) of feeling lightheaded sometimes, she notes episodes while she is in the shower. She reports having extensive workup as outpt including stress test, echos, MRIs of the head that were all unrevealing. Currently denies chest pressure/pain or lightheadedness. She reports burning in the throat and reflux symptoms. She denies fevers, chills, SOB, LE edema. (2024 21:22)      PAST MEDICAL & SURGICAL HISTORY:  Cataract      Diverticulitis      No significant past surgical history          Review of Systems:   CONSTITUTIONAL: No fever,  or fatigue  NECK: No pain or stiffness  RESPIRATORY: No cough,  No shortness of breath  CARDIOVASCULAR: No chest pain, palpitations, dizziness, or leg swelling  GASTROINTESTINAL: No abdominal  pain. No nausea, vomiting.  NEUROLOGICAL: No headaches,           Allergies    No Known Allergies    Intolerances        Social History:     FAMILY HISTORY:  FH: colon cancer (Sibling)        MEDICATIONS  (STANDING):  enoxaparin Injectable 40 milliGRAM(s) SubCutaneous every 24 hours  famotidine    Tablet 20 milliGRAM(s) Oral daily  influenza  Vaccine (HIGH DOSE) 0.7 milliLiter(s) IntraMuscular once  sodium chloride 0.9%. 1000 milliLiter(s) (100 mL/Hr) IV Continuous <Continuous>    MEDICATIONS  (PRN):  acetaminophen     Tablet .. 650 milliGRAM(s) Oral every 6 hours PRN Temp greater or equal to 38C (100.4F), Mild Pain (1 - 3)  aluminum hydroxide/magnesium hydroxide/simethicone Suspension 30 milliLiter(s) Oral every 4 hours PRN Dyspepsia  melatonin 3 milliGRAM(s) Oral at bedtime PRN Insomnia  ondansetron Injectable 4 milliGRAM(s) IV Push every 8 hours PRN Nausea and/or Vomiting      CAPILLARY BLOOD GLUCOSE        I&O's Summary      T(C): 36.7 (24 @ 15:33), Max: 36.8 (24 @ 23:55)  HR: 77 (24 @ 15:33) (67 - 80)  BP: 140/60 (24 @ 15:33) (120/73 - 145/87)  RR: 17 (24 @ 15:33) (15 - 18)  SpO2: 100% (24 @ 15:33) (99% - 100%)    PHYSICAL EXAM:    GENERAL: NAD  NECK: Supple, No JVD  CHEST/LUNG: Clear to auscultation bilaterally; No wheezing.  HEART: Regular rate and rhythm; No murmurs, rubs, or gallops  ABDOMEN: Soft, Nontender, Nondistended; Bowel sounds present  EXTREMITIES:  2+ Peripheral Pulses, No edema  NEUROLOGY: AAOx 3      LABS:                        12.7   4.85  )-----------( 333      ( 2024 14:01 )             37.8     02-16    142  |  105  |  15  ----------------------------<  89  4.1   |  26  |  0.87    Ca    9.4      2024 14:01    TPro  7.8  /  Alb  4.1  /  TBili  0.4  /  DBili  x   /  AST  13  /  ALT  6   /  AlkPhos  70  02-16          Urinalysis Basic - ( 2024 17:23 )    Color: Yellow / Appearance: Clear / S.016 / pH: x  Gluc: x / Ketone: Negative mg/dL  / Bili: Negative / Urobili: 1.0 mg/dL   Blood: x / Protein: Negative mg/dL / Nitrite: Negative   Leuk Esterase: Negative / RBC: 1 /HPF / WBC 1 /HPF   Sq Epi: x / Non Sq Epi: 2 /HPF / Bacteria: Occasional /HPF      CAPILLARY BLOOD GLUCOSE            RADIOLOGY & ADDITIONAL TESTS:    Imaging Personally Reviewed:    Consultant(s) Notes Reviewed:      Care Discussed with Consultants/Other Providers:    Thanks for consult. Will follow.

## 2024-02-17 NOTE — CONSULT NOTE ADULT - ASSESSMENT
79 y/o F PMHx diverticulosis, HTN not on medication presents c/o chest heaviness/pressure and an episode of presyncope admitted for further eval     Chest pain:    Tele  Cardio eval appreciated  TTE  NST    GERD:    David Famotidine/Maalox    Malcom family

## 2024-02-18 ENCOUNTER — TRANSCRIPTION ENCOUNTER (OUTPATIENT)
Age: 79
End: 2024-02-18

## 2024-02-18 ENCOUNTER — RESULT REVIEW (OUTPATIENT)
Age: 79
End: 2024-02-18

## 2024-02-18 LAB
ANION GAP SERPL CALC-SCNC: 11 MMOL/L — SIGNIFICANT CHANGE UP (ref 7–14)
BUN SERPL-MCNC: 21 MG/DL — SIGNIFICANT CHANGE UP (ref 7–23)
CALCIUM SERPL-MCNC: 9.2 MG/DL — SIGNIFICANT CHANGE UP (ref 8.4–10.5)
CHLORIDE SERPL-SCNC: 107 MMOL/L — SIGNIFICANT CHANGE UP (ref 98–107)
CO2 SERPL-SCNC: 23 MMOL/L — SIGNIFICANT CHANGE UP (ref 22–31)
CREAT SERPL-MCNC: 0.87 MG/DL — SIGNIFICANT CHANGE UP (ref 0.5–1.3)
EGFR: 69 ML/MIN/1.73M2 — SIGNIFICANT CHANGE UP
GLUCOSE SERPL-MCNC: 98 MG/DL — SIGNIFICANT CHANGE UP (ref 70–99)
HCT VFR BLD CALC: 37.8 % — SIGNIFICANT CHANGE UP (ref 34.5–45)
HGB BLD-MCNC: 12.8 G/DL — SIGNIFICANT CHANGE UP (ref 11.5–15.5)
MAGNESIUM SERPL-MCNC: 2.1 MG/DL — SIGNIFICANT CHANGE UP (ref 1.6–2.6)
MCHC RBC-ENTMCNC: 30.5 PG — SIGNIFICANT CHANGE UP (ref 27–34)
MCHC RBC-ENTMCNC: 33.9 GM/DL — SIGNIFICANT CHANGE UP (ref 32–36)
MCV RBC AUTO: 90.2 FL — SIGNIFICANT CHANGE UP (ref 80–100)
NRBC # BLD: 0 /100 WBCS — SIGNIFICANT CHANGE UP (ref 0–0)
NRBC # FLD: 0 K/UL — SIGNIFICANT CHANGE UP (ref 0–0)
PHOSPHATE SERPL-MCNC: 4.2 MG/DL — SIGNIFICANT CHANGE UP (ref 2.5–4.5)
PLATELET # BLD AUTO: 314 K/UL — SIGNIFICANT CHANGE UP (ref 150–400)
POTASSIUM SERPL-MCNC: 4.8 MMOL/L — SIGNIFICANT CHANGE UP (ref 3.5–5.3)
POTASSIUM SERPL-SCNC: 4.8 MMOL/L — SIGNIFICANT CHANGE UP (ref 3.5–5.3)
RBC # BLD: 4.19 M/UL — SIGNIFICANT CHANGE UP (ref 3.8–5.2)
RBC # FLD: 14.2 % — SIGNIFICANT CHANGE UP (ref 10.3–14.5)
SODIUM SERPL-SCNC: 141 MMOL/L — SIGNIFICANT CHANGE UP (ref 135–145)
WBC # BLD: 4.69 K/UL — SIGNIFICANT CHANGE UP (ref 3.8–10.5)
WBC # FLD AUTO: 4.69 K/UL — SIGNIFICANT CHANGE UP (ref 3.8–10.5)

## 2024-02-18 PROCEDURE — 70450 CT HEAD/BRAIN W/O DYE: CPT | Mod: 26

## 2024-02-18 PROCEDURE — 93010 ELECTROCARDIOGRAM REPORT: CPT

## 2024-02-18 PROCEDURE — 93018 CV STRESS TEST I&R ONLY: CPT | Mod: GC

## 2024-02-18 PROCEDURE — 78451 HT MUSCLE IMAGE SPECT SING: CPT | Mod: 26

## 2024-02-18 PROCEDURE — 93306 TTE W/DOPPLER COMPLETE: CPT | Mod: 26

## 2024-02-18 PROCEDURE — 93016 CV STRESS TEST SUPVJ ONLY: CPT | Mod: GC

## 2024-02-18 RX ORDER — SODIUM CHLORIDE 9 MG/ML
1000 INJECTION INTRAMUSCULAR; INTRAVENOUS; SUBCUTANEOUS ONCE
Refills: 0 | Status: COMPLETED | OUTPATIENT
Start: 2024-02-18 | End: 2024-02-18

## 2024-02-18 RX ADMIN — SODIUM CHLORIDE 100 MILLILITER(S): 9 INJECTION INTRAMUSCULAR; INTRAVENOUS; SUBCUTANEOUS at 16:51

## 2024-02-18 NOTE — PROGRESS NOTE ADULT - ASSESSMENT
A/P    79 y/o F PMHx diverticulosis, HTN not on medication presents c/o chest heaviness/pressure and an episode of presyncope admitted for further eval     #Pre-syncope, chest pain  -episodes in setting of preceding chest pain  -possibly related to ischemic heart disease  -head ct ok  -r/o ischemic heart disease  -echo wn;   -f/u nuclear stress  -trend orthostatics   -hydrate  -neuro eval     #hypertension.   -monitor off meds for now       #uti  -id f/u    dvt ppx      53 minutes spent on total encounter; more than 50% of the visit was spent counseling and/or coordinating care by the attending physician.

## 2024-02-18 NOTE — DISCHARGE NOTE PROVIDER - NSDCMRMEDTOKEN_GEN_ALL_CORE_FT
acetaminophen 325 mg oral tablet: 2 tab(s) orally every 6 hours As needed Temp greater or equal to 38C (100.4F), Mild Pain (1 - 3)  famotidine 20 mg oral tablet: 1 tab(s) orally once a day  melatonin 3 mg oral tablet: 1 tab(s) orally once a day (at bedtime) As needed Insomnia  Physical Therapy: 3-5 times a week for strengthening   acetaminophen 325 mg oral tablet: 2 tab(s) orally every 6 hours As needed Temp greater or equal to 38C (100.4F), Mild Pain (1 - 3)  famotidine 20 mg oral tablet: 1 tab(s) orally once a day  meclizine 12.5 mg oral tablet: 1 tab(s) orally every 8 hours  melatonin 3 mg oral tablet: 1 tab(s) orally once a day (at bedtime) As needed Insomnia  Physical Therapy: 3-5 times a week for strengthening

## 2024-02-18 NOTE — DISCHARGE NOTE PROVIDER - NSDCCPCAREPLAN_GEN_ALL_CORE_FT
PRINCIPAL DISCHARGE DIAGNOSIS  Diagnosis: Syncope  Assessment and Plan of Treatment: You were evaluated by the cardiologist and has an echocardiogram and stress test. They were normal. Continue to take your medications as prescribed. Please follow up with your primary care provider.        SECONDARY DISCHARGE DIAGNOSES  Diagnosis: Hypertension  Assessment and Plan of Treatment: Continue blood pressure medication regimen as directed. Monitor for any visual changes, headaches or dizziness.  Monitor blood pressure regularly.  Follow up with your primary care provider for further management for high blood pressure.       PRINCIPAL DISCHARGE DIAGNOSIS  Diagnosis: Syncope  Assessment and Plan of Treatment: You were evaluated by the cardiologist and has an echocardiogram and stress test. They were normal. CT head was negative for acute stroke or bleeding. Carotid dopplers did not show significant blockages in your carotid arteries. You were treated for orthostatic hypotension. Your blood pressure would drop upon sitting or standing however this resolved with treatment of IV fluids. This may have been due to dehydration. Change positions slowly and give your body time to adjust to change in positioning for example sit at edge of bed for couple minutes before standing. You may also wear compression stockings. You can take meclizine as needed for dizziness as this could be due to peripheral vertigo. Continue to take your medications as prescribed. Please follow up with your primary care provider.        SECONDARY DISCHARGE DIAGNOSES  Diagnosis: Hypertension  Assessment and Plan of Treatment: Continue blood pressure medication regimen as directed. Monitor for any visual changes, headaches or dizziness.  Monitor blood pressure regularly.  Follow up with your primary care provider for further management for high blood pressure.

## 2024-02-18 NOTE — DISCHARGE NOTE PROVIDER - HOSPITAL COURSE
79 y/o F PMHx diverticulosis, HTN not on medication presents c/o chest heaviness/pressure and an episode of presyncope admitted for further eval     #Pre-syncope, chest pain  -episodes in setting of preceding chest pain  -possibly related to ischemic heart disease  -head ct ok  -r/o ischemic heart disease  -echo wn;   -f/u nuclear stress  -trend orthostatics   -hydrate  -neuro eval     #hypertension.   -monitor off meds for now       #uti  Pt w/o urinary sx although reporting urinary incontinence on occasion that is not new.   Denies fever/chills/dysuria or urinary frequency  UA negative, likely asymptomatic bacteruria     On ___ this case was reviewed with  ____, the patient is medically stable and optimized for discharge. All medications were reviewed and prescriptions were sent to mutually agreed upon pharmacy. The patient agrees to follow up with providers as recommended.     79 y/o F PMHx diverticulosis, HTN not on medication presents c/o chest heaviness/pressure and an episode of presyncope admitted for further eval     #Pre-syncope, chest pain  -episodes in setting of preceding chest pain  -possibly related to ischemic heart disease  -head ct ok  -r/o ischemic heart disease  -echo wn;   -f/u nuclear stress  -trend orthostatics   -hydrate  -neuro eval     #hypertension.   -monitor off meds for now     #uti  Pt w/o urinary sx although reporting urinary incontinence on occasion that is not new.   Denies fever/chills/dysuria or urinary frequency  UA negative, likely asymptomatic bacteruria     On 2/10 this case was reviewed with cardiology, the patient is medically stable and optimized for discharge. All medications were reviewed and prescriptions were sent to mutually agreed upon pharmacy. The patient agrees to follow up with providers as recommended.     79 y/o F PMHx diverticulosis, HTN not on medication presents c/o chest heaviness/pressure and an episode of presyncope admitted for further eval     #Pre-syncope, chest pain  -episodes in setting of preceding chest pain - HST neg  -head ct neg  -echo wn; EF 54%  -NST: normal  -carotid doppers:  No hemodynamically significant stenosis.    + orthos s/p IVF now resolved, orthos neg    #hypertension  -monitor off meds for now     #uti  Pt w/o urinary sx although reporting urinary incontinence on occasion that is not new.   Denies fever/chills/dysuria or urinary frequency  UA negative, likely asymptomatic bacteruria     On 2/10 this case was reviewed with cardiology, the patient is medically stable and optimized for discharge. All medications were reviewed and prescriptions were sent to mutually agreed upon pharmacy. The patient agrees to follow up with providers as recommended.

## 2024-02-18 NOTE — PROGRESS NOTE ADULT - ASSESSMENT
79 y/o F PMHx diverticulosis, HTN not on medication presents c/o chest heaviness/pressure and an episode of presyncope admitted for further eval     Chest pain:    Tele  Cardio eval appreciated  TTE/NST reviewed    Near syncope:    CT head: No acute CVA/Bleed    GERD:    Cw Famotidine/Maalox    GPC in urine:    ID eval appreciated  No need for antibiotics    Dw family

## 2024-02-18 NOTE — CONSULT NOTE ADULT - SUBJECTIVE AND OBJECTIVE BOX
[facility], Division of Infectious Diseases  LIBIA Mackenzie S. Shah, Y. Patel, [facility]  [phone]    NAHED SHEPPARD  78y, Female  [id number]    HPI--  HPI:  77 y/o F PMHx diverticulosis, HTN not on medication presents c/o chest heaviness/pressure and an episode of presyncope earlier today. Patient states she has been having intermittent episodes of epigastric/chest pressure worse with activity. Today the pressure intensified prompting her to present to PCP this morning for evaluation. While she was in the office, she had an episode where she became lightheaded while sitting. She is unsure if she passed out but she is unable to remember the full event. She reports associated shallow breathing but denies SOB, denies N/V or chest pain. She does admit to not eating anything prior to the PCP visit. She also reports several other episodes of lightheadedness. Denies any association with changes in position. Last was a few days prior while she was at a . She reports feeling hot and diaphoretic and like she was going to pass out. She also had a fall mid January and cannot remember the details of how she ended up on the ground. She was seen at [facility] and had a head CT with only a frontal scalp hematoma. Since the fall, she reports numbness on the top of her head. Otherwise no visual changes, headache, focal weakness or numbness. She reports a long history (20+ years) of feeling lightheaded sometimes, she notes episodes while she is in the shower. She reports having extensive workup as outpt including stress test, echos, MRIs of the head that were all unrevealing. Currently denies chest pressure/pain or lightheadedness. She reports burning in the throat and reflux symptoms. She denies fevers, chills, SOB, LE edema. (2024 21:22)        Active Medications--  acetaminophen     Tablet .. 650 milliGRAM(s) Oral every 6 hours PRN  aluminum hydroxide/magnesium hydroxide/simethicone Suspension 30 milliLiter(s) Oral every 4 hours PRN  enoxaparin Injectable 40 milliGRAM(s) SubCutaneous every 24 hours  famotidine    Tablet 20 milliGRAM(s) Oral daily  influenza  Vaccine (HIGH DOSE) 0.7 milliLiter(s) IntraMuscular once  melatonin 3 milliGRAM(s) Oral at bedtime PRN  ondansetron Injectable 4 milliGRAM(s) IV Push every 8 hours PRN  sodium chloride 0.9%. 1000 milliLiter(s) IV Continuous <Continuous>    Antimicrobials:     Immunologic: influenza  Vaccine (HIGH DOSE) 0.7 milliLiter(s) IntraMuscular once      ROS:  CONSTITUTIONAL: No fevers or chills. No weakness or headache. No weight changes.  EYES/ENT: No visual or hearing changes. No sore throat or throat pain .  NECK: No pain or stiffness  RESPIRATORY: No cough, wheezing, or hemoptysis. No shortness of breath  CARDIOVASCULAR: No chest pain or palpitations  GASTROINTESTINAL: No abdominal pain. No nausea or vomiting. No diarrhea or constipation.  GENITOURINARY: No dysuria, frequency or hematuria  NEUROLOGICAL: No numbness or weakness  SKIN: No itching or rashes  PSYCHIATRIC: Pleasant. Appropriate affect    Allergies: No Known Allergies    PMH -- Cataract    Diverticulitis of intestine    Arthritis    HTN (hypertension)    Diverticulitis      PSH -- No significant past surgical history      FH -- No pertinent family history in first degree relatives    No pertinent family history in first degree relatives    FH: colon cancer (Sibling)      Social History --  EtOH: denies   Tobacco: denies   Drug Use: denies     Travel/Environmental/Occupational History:    Physical Exam--  Vital Signs Last 24 Hrs  T(F): 98 (2024 06:00), Max: 98.3 (2024 17:34)  HR: 69 (2024 06:00) (63 - 78)  BP: 117/98 (2024 06:00) (117/66 - 149/84)  RR: 15 (2024 06:00) (15 - 18)  SpO2: 100% (2024 06:00) (98% - 100%)  General: nontoxic-appearing, no acute distress  HEENT: NC/AT, EOMI, anicteric, conjunctiva pink and moist, oropharynx clear, dentition fair  Neck: Not rigid. No sense of mass. No LAD  Lungs: Clear bilaterally without rales,   Abdomen: Soft. Nondistended. Nontender. Bowel sounds present. No organomegaly.  Back: No spinal tenderness. No costovertebral angle tenderness.  Extremities: No cyanosis or clubbing. No edema.   Skin: Warm. Dry. Good turgor.     Laboratory & Imaging Data:  CBC:                       12.8   4.69  )-----------( 314      ( 2024 06:35 )             37.8     CMP:     141  |  107  |  21  ----------------------------<  98  4.8   |  23  |  0.87    Ca    9.2      2024 06:35  Phos  4.2       Mg     2.10         TPro  7.8  /  Alb  4.1  /  TBili  0.4  /  DBili  x   /  AST  13  /  ALT  6   /  AlkPhos  70      LIVER FUNCTIONS - ( 2024 14:01 )  Alb: 4.1 g/dL / Pro: 7.8 g/dL / ALK PHOS: 70 U/L / ALT: 6 U/L / AST: 13 U/L / GGT: x           Urinalysis Basic - ( 2024 06:35 )    Color: x / Appearance: x / SG: x / pH: x  Gluc: 98 mg/dL / Ketone: x  / Bili: x / Urobili: x   Blood: x / Protein: x / Nitrite: x   Leuk Esterase: x / RBC: x / WBC x   Sq Epi: x / Non Sq Epi: x / Bacteria: x        Microbiology: reviewed    Culture - Urine (collected 24 @ 16:54)  Source: Clean Catch Clean Catch (Midstream)  Preliminary Report (24 @ 07:39):    >100,000 CFU/ml Gram Positive Cocci in Pairs and Chains          Radiology: reviewed    < from: Xray Chest 2 Views PA/Lat (24 @ 15:09) >    ACC: [id number] EXAM:  XR CHEST PA LAT 2V   ORDERED BY: GELA TRIVEDI     PROCEDURE DATE:  2024          INTERPRETATION:  CLINICAL HISTORY: Chest Pain.    COMPARISON: Chest radiograph dated 2022.    TECHNIQUE: Portable frontal chest radiograph. Adequate positioning.    FINDINGS:    Support devices: Telemetry leads overlie the chest.    Cardiac/mediastinum/hilum: Cardiomediastinal silhouette is within normal   limits.    Lung parenchyma/Pleura: No focal parenchymal opacities, pleural   effusions, or pneumothorax.    Skeleton/soft tissues: No acute pathology. Degenerative changes.      IMPRESSION:    No radiographic evidence of acute cardiopulmonary disease.    --- End of Report ---          LUKE PETERSON MD; Resident Radiologist  This document has been electronically signed.  SAÚL BURRELL MD; Attending Interventional Radiologist  This document has been electronically signed. 2024  3:36PM    < end of copied text >   [facility], Division of Infectious Diseases  LIBIA Mackenzie S. Shah, Y. Patel, [facility]  [phone]    NAHED SHEPPARD  78y, Female  [id number]    HPI--  HPI:  77 y/o F PMHx diverticulosis, HTN not on medication presents c/o chest heaviness/pressure and an episode of presyncope earlier today. Patient states she has been having intermittent episodes of epigastric/chest pressure worse with activity. Today the pressure intensified prompting her to present to PCP this morning for evaluation. While she was in the office, she had an episode where she became lightheaded while sitting. She is unsure if she passed out but she is unable to remember the full event. She reports associated shallow breathing but denies SOB, denies N/V or chest pain. She does admit to not eating anything prior to the PCP visit. She also reports several other episodes of lightheadedness. Denies any association with changes in position. Last was a few days prior while she was at a . She reports feeling hot and diaphoretic and like she was going to pass out. She also had a fall mid January and cannot remember the details of how she ended up on the ground. She was seen at [facility] and had a head CT with only a frontal scalp hematoma. Since the fall, she reports numbness on the top of her head. Otherwise no visual changes, headache, focal weakness or numbness. She reports a long history (20+ years) of feeling lightheaded sometimes, she notes episodes while she is in the shower. She reports having extensive workup as outpt including stress test, echos, MRIs of the head that were all unrevealing. Currently denies chest pressure/pain or lightheadedness. She reports burning in the throat and reflux symptoms. She denies fevers, chills, SOB, LE edema. (2024 21:22)    Pt seen at bedside in echocardiogram  Denies urinary sx  No other complaints  Hx as above      Active Medications--  acetaminophen     Tablet .. 650 milliGRAM(s) Oral every 6 hours PRN  aluminum hydroxide/magnesium hydroxide/simethicone Suspension 30 milliLiter(s) Oral every 4 hours PRN  enoxaparin Injectable 40 milliGRAM(s) SubCutaneous every 24 hours  famotidine    Tablet 20 milliGRAM(s) Oral daily  influenza  Vaccine (HIGH DOSE) 0.7 milliLiter(s) IntraMuscular once  melatonin 3 milliGRAM(s) Oral at bedtime PRN  ondansetron Injectable 4 milliGRAM(s) IV Push every 8 hours PRN  sodium chloride 0.9%. 1000 milliLiter(s) IV Continuous <Continuous>    Antimicrobials:     Immunologic: influenza  Vaccine (HIGH DOSE) 0.7 milliLiter(s) IntraMuscular once      ROS:  CONSTITUTIONAL: No fevers or chills. No weakness or headache. No weight changes.  EYES/ENT: No visual or hearing changes. No sore throat or throat pain .  NECK: No pain or stiffness  RESPIRATORY: No cough, wheezing, or hemoptysis. No shortness of breath  CARDIOVASCULAR: No chest pain or palpitations  GASTROINTESTINAL: No abdominal pain. No nausea or vomiting. No diarrhea or constipation.  GENITOURINARY: No dysuria, frequency or hematuria  NEUROLOGICAL: No numbness or weakness  SKIN: No itching or rashes  PSYCHIATRIC: Pleasant. Appropriate affect    Allergies: No Known Allergies    PMH -- Cataract    Diverticulitis of intestine    Arthritis    HTN (hypertension)    Diverticulitis      PSH -- No significant past surgical history      FH -- No pertinent family history in first degree relatives    No pertinent family history in first degree relatives    FH: colon cancer (Sibling)      Social History --  EtOH: denies   Tobacco: denies   Drug Use: denies     Travel/Environmental/Occupational History:    Physical Exam--  Vital Signs Last 24 Hrs  T(F): 98 (2024 06:00), Max: 98.3 (2024 17:34)  HR: 69 (2024 06:00) (63 - 78)  BP: 117/98 (2024 06:00) (117/66 - 149/84)  RR: 15 (2024 06:00) (15 - 18)  SpO2: 100% (2024 06:00) (98% - 100%)  General: nontoxic-appearing, no acute distress  HEENT: NC/AT, EOMI,  Lungs: Clear bilaterally without rales,   Abdomen: Soft. Nondistended. Nontender.   Extremities: No cyanosis or clubbing. No edema.   Skin: Warm. Dry. Good turgor.     Laboratory & Imaging Data:  CBC:                       12.8   4.69  )-----------( 314      ( 2024 06:35 )             37.8     CMP:     141  |  107  |  21  ----------------------------<  98  4.8   |  23  |  0.87    Ca    9.2      2024 06:35  Phos  4.2       Mg     2.10         TPro  7.8  /  Alb  4.1  /  TBili  0.4  /  DBili  x   /  AST  13  /  ALT  6   /  AlkPhos  70  -16    LIVER FUNCTIONS - ( 2024 14:01 )  Alb: 4.1 g/dL / Pro: 7.8 g/dL / ALK PHOS: 70 U/L / ALT: 6 U/L / AST: 13 U/L / GGT: x           Urinalysis Basic - ( 2024 06:35 )    Color: x / Appearance: x / SG: x / pH: x  Gluc: 98 mg/dL / Ketone: x  / Bili: x / Urobili: x   Blood: x / Protein: x / Nitrite: x   Leuk Esterase: x / RBC: x / WBC x   Sq Epi: x / Non Sq Epi: x / Bacteria: x        Microbiology: reviewed    Culture - Urine (collected 24 @ 16:54)  Source: Clean Catch Clean Catch (Midstream)  Preliminary Report (24 @ 07:39):    >100,000 CFU/ml Gram Positive Cocci in Pairs and Chains          Radiology: reviewed    < from: Xray Chest 2 Views PA/Lat (24 @ 15:09) >    ACC: [id number] EXAM:  XR CHEST PA LAT 2V   ORDERED BY: GELA TRIVEDI     PROCEDURE DATE:  2024          INTERPRETATION:  CLINICAL HISTORY: Chest Pain.    COMPARISON: Chest radiograph dated 2022.    TECHNIQUE: Portable frontal chest radiograph. Adequate positioning.    FINDINGS:    Support devices: Telemetry leads overlie the chest.    Cardiac/mediastinum/hilum: Cardiomediastinal silhouette is within normal   limits.    Lung parenchyma/Pleura: No focal parenchymal opacities, pleural   effusions, or pneumothorax.    Skeleton/soft tissues: No acute pathology. Degenerative changes.      IMPRESSION:    No radiographic evidence of acute cardiopulmonary disease.    --- End of Report ---          LUKE PETERSON MD; Resident Radiologist  This document has been electronically signed.  SAÚL BURRELL MD; Attending Interventional Radiologist  This document has been electronically signed. 2024  3:36PM    < end of copied text >

## 2024-02-18 NOTE — CONSULT NOTE ADULT - ASSESSMENT
Full consult to follow    Infectious Diseases will continue to follow. Please call with any questions.   Caryl Steel M.D.  [facility] Division of Infectious Diseases [phone]  For after 5 P.M. and weekends, please call [phone]   79 y/o F PMHx diverticulosis, HTN not on medication presents c/o chest heaviness/pressure and an episode of presyncope admitted for further eval   ID c/s for GPC in urine    Pt w/o urinary sx although reporting urinary incontinence on occasion that is not new.   Denies fever/chills/dysuria or urinary frequency  UA negative, likely asymptomatic bacteruria     Recommendations:   Monitor off Abx  F/u UCx GPC  F/u TTE  stable from ID standpoint  additional management per primary team    Dr. Angel to resume care 2/19  Infectious Diseases will continue to follow. Please call with any questions.   Caryl Steel M.D.  [facility] Division of Infectious Diseases [phone]  For after 5 P.M. and weekends, please call [phone]

## 2024-02-18 NOTE — DISCHARGE NOTE PROVIDER - CARE PROVIDER_API CALL
Kt Madden  Internal Medicine  [id number] [place] [id number]  Phone: [phone]  Fax: [phone]  Follow Up Time: 2 weeks

## 2024-02-18 NOTE — DISCHARGE NOTE PROVIDER - NSDCCPTREATMENT_GEN_ALL_CORE_FT
PRINCIPAL PROCEDURE  Procedure: Transthoracic echocardiogram  Findings and Treatment: CONCLUSIONS:      1. Left ventricular cavity is normal in size. Left ventricular wall thickness is normal. Left ventricular systolic function is normal with an ejection fraction of 54 % by Felix's method of disks. There are no regional wall motion abnormalities seen.   2. Normal left ventricular diastolic function, with normal filling pressure.   3. Normal right ventricular cavity size, with wall thickness, and normal systolic function.   4. Normal left and right atrial size.   5. No significant valvular disease.   6. Estimated pulmonary artery systolic pressure is 14 mmHg.   7. No pericardial effusion seen.   8. No prior echocardiogram is available for comparison.        SECONDARY PROCEDURE  Procedure: Nuclear medicine cardiopulmonary stress test  Findings and Treatment: 1. Normal myocardial perfusion scan, with no evidence of infarction or inducible ischemia.   2. Normal left ventricular regional wall motion.   3. Hypertensive blood pressure response throughout pharmocologic test pharmocologic blood pressure response.   4. The left ventricle is normal in function and normal in size. Normal left ventricular diastolic function.   5. Normal right ventricular function. There is no right ventricular dilation. RVEF 61%; RVEDV 108 mL; RVESV 42 mL.

## 2024-02-19 LAB
-  AMPICILLIN: SIGNIFICANT CHANGE UP
-  CIPROFLOXACIN: SIGNIFICANT CHANGE UP
-  LEVOFLOXACIN: SIGNIFICANT CHANGE UP
-  NITROFURANTOIN: SIGNIFICANT CHANGE UP
-  TETRACYCLINE: SIGNIFICANT CHANGE UP
-  VANCOMYCIN: SIGNIFICANT CHANGE UP
CULTURE RESULTS: ABNORMAL
GLUCOSE BLDC GLUCOMTR-MCNC: 88 MG/DL — SIGNIFICANT CHANGE UP (ref 70–99)
METHOD TYPE: SIGNIFICANT CHANGE UP
ORGANISM # SPEC MICROSCOPIC CNT: ABNORMAL
ORGANISM # SPEC MICROSCOPIC CNT: ABNORMAL
SPECIMEN SOURCE: SIGNIFICANT CHANGE UP

## 2024-02-19 RX ORDER — MECLIZINE HCL 12.5 MG
12.5 TABLET ORAL EVERY 8 HOURS
Refills: 0 | Status: DISCONTINUED | OUTPATIENT
Start: 2024-02-19 | End: 2024-02-20

## 2024-02-19 RX ORDER — SODIUM CHLORIDE 9 MG/ML
1000 INJECTION INTRAMUSCULAR; INTRAVENOUS; SUBCUTANEOUS
Refills: 0 | Status: DISCONTINUED | OUTPATIENT
Start: 2024-02-19 | End: 2024-02-19

## 2024-02-19 RX ADMIN — Medication 12.5 MILLIGRAM(S): at 14:34

## 2024-02-19 RX ADMIN — FAMOTIDINE 20 MILLIGRAM(S): 10 INJECTION INTRAVENOUS at 14:35

## 2024-02-19 NOTE — CONSULT NOTE ADULT - ASSESSMENT
77 y/o F PMHx diverticulosis, HTN not on medication presents c/o chest heaviness/pressure and an episode of presyncope admitted for further eval. Likely related to chest pain  Prior MRIs unrevealing- last MRI here was in 2016  TTE reviewed, no clear source      77 y/o F PMHx diverticulosis, HTN not on medication presents c/o chest heaviness/pressure and an episode of presyncope admitted for further eval. States she has been experiencing these symptoms since having COVID in 2022.    Prior MRIs for similar symptoms unrevealing- last MRI here was in 2016.  CTH neg  TTE reviewed, no clear source to explain syncope  o/e no signs of posterior circulation dysfunction - however is not able to stand without feeling dizzy and off balance    Presyncopal episodes with generalized weakness - likely related to peripheral vertigo     - Check orthostatics  - consider carotid duplex if not yet checked  - meclizien 12.5mg TID PRN  - Cardiology following for chest pain  - MRI brain unlikely to  acute as inpt - can do as outpatient   - check b12, folate, tsh  - would benefit from outpatient vestibular therapy  - pt/ot   - dvt ppx    Eden Leiva DO  Vascular Neurology  Office [phone]  Available via Microsoft Teams

## 2024-02-19 NOTE — PROGRESS NOTE ADULT - ASSESSMENT
79 y/o F PMHx diverticulosis, HTN not on medication presents c/o chest heaviness/pressure and an episode of presyncope admitted for further eval   ID c/s for GPC in urine    asymptomatic bacteruria   No urinary symptoms   has urinary incontinence on occasion, however, patient states this has been chronic since her hysterectomy  Denies fever/chills/dysuria; no suprapubic tenderness  UA w/ no pyuria, urine cx w/ E faecalis    Recommendations:   continue off antibiotics  no objection to discharge from ID perspective    Pepe Angel M.D.  [facility], Division of Infectious Diseases  [phone]  After 5pm on weekdays and all day on weekends - please call [phone]

## 2024-02-19 NOTE — CONSULT NOTE ADULT - SUBJECTIVE AND OBJECTIVE BOX
Neurology Consult    Reason for Consult: Patient is a 78y old  Female who presents with a chief complaint of lightheadedness, presyncope, chest pressure (2024 10:17)      HPI:  77 y/o F PMHx diverticulosis, HTN not on medication presents c/o chest heaviness/pressure and an episode of presyncope earlier today. Patient states she has been having intermittent episodes of epigastric/chest pressure worse with activity. Today the pressure intensified prompting her to present to PCP this morning for evaluation. While she was in the office, she had an episode where she became lightheaded while sitting. She is unsure if she passed out but she is unable to remember the full event. She reports associated shallow breathing but denies SOB, denies N/V or chest pain. She does admit to not eating anything prior to the PCP visit. She also reports several other episodes of lightheadedness. Denies any association with changes in position. Last was a few days prior while she was at a . She reports feeling hot and diaphoretic and like she was going to pass out. She also had a fall mid January and cannot remember the details of how she ended up on the ground. She was seen at [facility] and had a head CT with only a frontal scalp hematoma. Since the fall, she reports numbness on the top of her head. Otherwise no visual changes, headache, focal weakness or numbness. She reports a long history (20+ years) of feeling lightheaded sometimes, she notes episodes while she is in the shower. She reports having extensive workup as outpt including stress test, echos, MRIs of the head that were all unrevealing. Currently denies chest pressure/pain or lightheadedness. She reports burning in the throat and reflux symptoms. She denies fevers, chills, SOB, LE edema. (2024 21:22)       PAST MEDICAL & SURGICAL HISTORY:  Cataract      Diverticulitis      No significant past surgical history          Allergies: Allergies    No Known Allergies    Intolerances        Social History: Denies toxic habits including tobacco, ETOH or illicit drugs.    Family History: FAMILY HISTORY:  FH: colon cancer (Sibling)    . No family history of strokes    Medications: MEDICATIONS  (STANDING):  enoxaparin Injectable 40 milliGRAM(s) SubCutaneous every 24 hours  famotidine    Tablet 20 milliGRAM(s) Oral daily  influenza  Vaccine (HIGH DOSE) 0.7 milliLiter(s) IntraMuscular once  sodium chloride 0.9%. 1000 milliLiter(s) (100 mL/Hr) IV Continuous <Continuous>    MEDICATIONS  (PRN):  acetaminophen     Tablet .. 650 milliGRAM(s) Oral every 6 hours PRN Temp greater or equal to 38C (100.4F), Mild Pain (1 - 3)  aluminum hydroxide/magnesium hydroxide/simethicone Suspension 30 milliLiter(s) Oral every 4 hours PRN Dyspepsia  melatonin 3 milliGRAM(s) Oral at bedtime PRN Insomnia  ondansetron Injectable 4 milliGRAM(s) IV Push every 8 hours PRN Nausea and/or Vomiting      Review of Systems:  CONSTITUTIONAL:  No weight loss, fever, chills, weakness or fatigue.  HEENT:  Eyes:  No visual loss, blurred vision, double vision or yellow sclera. Ears, Nose, Throat:  No hearing loss, sneezing, congestion, runny nose or sore throat.  SKIN:  No rash or itching.  CARDIOVASCULAR:  No chest pain, chest pressure or chest discomfort. No palpitations or edema.  RESPIRATORY:  No shortness of breath, cough or sputum.  GASTROINTESTINAL:  No anorexia, nausea, vomiting or diarrhea. No abdominal pain or blood.  GENITOURINARY:  No burning on urination or incontinence   NEUROLOGICAL:  No headache, dizziness, syncope, paralysis, ataxia, numbness or tingling in the extremities. No change in bowel or bladder control. no limb weakness. no vision changes.   MUSCULOSKELETAL:  No muscle, back pain, joint pain or stiffness.  HEMATOLOGIC:  No anemia, bleeding or bruising.  LYMPHATICS:  No enlarged nodes. No history of splenectomy.  PSYCHIATRIC:  No history of depression or anxiety.  ENDOCRINOLOGIC:  No reports of sweating, cold or heat intolerance. No polyuria or polydipsia.      Vitals:  Vital Signs Last 24 Hrs  T(C): 36.2 (2024 10:34), Max: 36.7 (2024 12:48)  T(F): 97.2 (2024 10:34), Max: 98.1 (2024 12:48)  HR: 75 (2024 00:51) (68 - 78)  BP: 140/71 (2024 00:51) (117/68 - 147/79)  BP(mean): --  RR: 18 (2024 00:51) (18 - 18)  SpO2: 100% (2024 00:51) (100% - 100%)    Parameters below as of 2024 00:51  Patient On (Oxygen Delivery Method): room air        General Exam:   General Appearance: Appropriately dressed and in no acute distress       Head: Normocephalic, atraumatic and no dysmorphic features  Ear, Nose, and Throat: Moist mucous membranes  CVS: S1S2+  Resp: No SOB, no wheeze or rhonchi  GI: soft NT/ND  Extremities: No edema or cyanosis  Skin: No bruises or rashes     Neurological Exam:  Mental Status: Awake, alert and oriented x 3.  Able to follow simple and complex verbal commands. Able to name and repeat. fluent speech. No obvious aphasia or dysarthria noted.   Cranial Nerves: PERRL, EOMI, VFFC, sensation V1-V3 intact,  no obvious facial asymmetry, equal elevation of palate, scm/trap 5/5, tongue is midline on protrusion. no obvious papilledema on fundoscopic exam. hearing is grossly intact.   Motor: Normal bulk, tone and strength throughout. Fine finger movements were intact and symmetric. no tremors or drift noted.    Sensation: Intact to light touch and pinprick throughout. no right/left confusion. no extinction to tactile on DSS. Romberg was negative.   Reflexes: 1+ throughout at biceps, brachioradialis, triceps, patellars and ankles bilaterally and equal. No clonus. R toe and L toe were both downgoing.  Coordination: No dysmetria on FNF or HKS  Gait: Narrow based and steady. Able to tandem. no limitations in gait.     Data/Labs/Imaging which I personally reviewed.     Labs:     CBC Full  -  ( 2024 06:35 )  WBC Count : 4.69 K/uL  RBC Count : 4.19 M/uL  Hemoglobin : 12.8 g/dL  Hematocrit : 37.8 %  Platelet Count - Automated : 314 K/uL  Mean Cell Volume : 90.2 fL  Mean Cell Hemoglobin : 30.5 pg  Mean Cell Hemoglobin Concentration : 33.9 gm/dL  Auto Neutrophil # : x  Auto Lymphocyte # : x  Auto Monocyte # : x  Auto Eosinophil # : x  Auto Basophil # : x  Auto Neutrophil % : x  Auto Lymphocyte % : x  Auto Monocyte % : x  Auto Eosinophil % : x  Auto Basophil % : x        141  |  107  |  21  ----------------------------<  98  4.8   |  23  |  0.87    Ca    9.2      2024 06:35  Phos  4.2       Mg     2.10               Urinalysis Basic - ( 2024 06:35 )    Color: x / Appearance: x / SG: x / pH: x  Gluc: 98 mg/dL / Ketone: x  / Bili: x / Urobili: x   Blood: x / Protein: x / Nitrite: x   Leuk Esterase: x / RBC: x / WBC x   Sq Epi: x / Non Sq Epi: x / Bacteria: x     EXAM:  BRAIN MRI        PROCEDURE DATE:  2016      INTERPRETATION:  CLINICAL HISTORY: Ataxia, rule out CVA    COMPARISON: CT head dated 2016    TECHNIQUE: MRI brain: Multiplanar, multisequence MR imaging of the brain   are obtained without the administration of intravenous gadolinium.     FINDINGS:  There is no abnormal restricted diffusion to suggest acute infarction.   Scattered periventricular and subcortical white matter T2 /FLAIR   hyperintensities are seen without mass effect,nonspecific, likely   representing trace chronic microvascular changes.  Normal T2 flow-voids are seen within  the intracranial vasculature. The   lateral ventricles and cortical sulci are age appropriate in size and   configuration. There is no mass, mass effect extra-axial fluid   collection. There is no susceptibility artifact to suggest hemorrhage.   Midline structures are normal.     The patient is status post bilateral ocular lens replacement surgery.   Mild inflammatory mucosal changes areseen throughout the various   portions of the paranasal sinuses. The orbits and mastoid air cells are   otherwise unremarkable.    IMPRESSION: No acute infarction.          < from: CT Head No Cont (24 @ 17:29) >    ACC: [id number] EXAM:  CT BRAIN   ORDERED BY: LILIBETH PRETTY     PROCEDURE DATE:  2024          INTERPRETATION:  CLINICAL INFORMATION: Syncope.    COMPARISON: CT head of 2024.    PROCEDURE:  Noncontrast CT of the Head was performed from the skull base to the   vertex. Coronal and Sagittal reformats were obtained.    FINDINGS:    There is no acute intracranial hemorrhage, mass effect, midline shift,   extra-axial collection, hydrocephalus, or evidence of acute vascular   territorial infarction.    The visualized paranasal sinuses and mastoid air cells are clear. Status   post bilateral ocular lens replacement. Right frontal scalp soft tissue   swelling. No calvarial fracture.    IMPRESSION:    No acute intracranial hemorrhage or calvarial fracture.    < end of copied text >   Neurology Consult    Reason for Consult: Patient is a 78y old  Female who presents with a chief complaint of lightheadedness, presyncope, chest pressure (2024 10:17)      HPI:  79 y/o F PMHx diverticulosis, HTN not on medication presents c/o chest heaviness/pressure and an episode of presyncope earlier today. Patient states she has been having intermittent episodes of epigastric/chest pressure worse with activity. Today the pressure intensified prompting her to present to PCP this morning for evaluation. While she was in the office, she had an episode where she became lightheaded while sitting. She is unsure if she passed out but she is unable to remember the full event. She reports associated shallow breathing but denies SOB, denies N/V or chest pain. She does admit to not eating anything prior to the PCP visit. She also reports several other episodes of lightheadedness. Denies any association with changes in position. Last was a few days prior while she was at a . She reports feeling hot and diaphoretic and like she was going to pass out. She also had a fall mid January and cannot remember the details of how she ended up on the ground. She was seen at [facility] and had a head CT with only a frontal scalp hematoma. Since the fall, she reports numbness on the top of her head. Otherwise no visual changes, headache, focal weakness or numbness. She reports a long history (20+ years) of feeling lightheaded sometimes, she notes episodes while she is in the shower. She reports having extensive workup as outpt including stress test, echos, MRIs of the head that were all unrevealing. Currently denies chest pressure/pain or lightheadedness. She reports burning in the throat and reflux symptoms. She denies fevers, chills, SOB, LE edema. (2024 21:22)       PAST MEDICAL & SURGICAL HISTORY:  Cataract      Diverticulitis      No significant past surgical history          Allergies: Allergies    No Known Allergies    Intolerances        Social History: Denies toxic habits including tobacco, ETOH or illicit drugs.    Family History: FAMILY HISTORY:  FH: colon cancer (Sibling)    . No family history of strokes    Medications: MEDICATIONS  (STANDING):  enoxaparin Injectable 40 milliGRAM(s) SubCutaneous every 24 hours  famotidine    Tablet 20 milliGRAM(s) Oral daily  influenza  Vaccine (HIGH DOSE) 0.7 milliLiter(s) IntraMuscular once  sodium chloride 0.9%. 1000 milliLiter(s) (100 mL/Hr) IV Continuous <Continuous>    MEDICATIONS  (PRN):  acetaminophen     Tablet .. 650 milliGRAM(s) Oral every 6 hours PRN Temp greater or equal to 38C (100.4F), Mild Pain (1 - 3)  aluminum hydroxide/magnesium hydroxide/simethicone Suspension 30 milliLiter(s) Oral every 4 hours PRN Dyspepsia  melatonin 3 milliGRAM(s) Oral at bedtime PRN Insomnia  ondansetron Injectable 4 milliGRAM(s) IV Push every 8 hours PRN Nausea and/or Vomiting      Review of Systems:  CONSTITUTIONAL:  No weight loss, fever, chills, weakness or fatigue.  HEENT:  Eyes:  No visual loss, blurred vision, double vision or yellow sclera. Ears, Nose, Throat:  No hearing loss, sneezing, congestion, runny nose or sore throat.  SKIN:  No rash or itching.  CARDIOVASCULAR:  No chest pain, chest pressure or chest discomfort. No palpitations or edema.  RESPIRATORY:  No shortness of breath, cough or sputum.  GASTROINTESTINAL:  No anorexia, nausea, vomiting or diarrhea. No abdominal pain or blood.  GENITOURINARY:  No burning on urination or incontinence   NEUROLOGICAL:  No headache, dizziness, syncope, paralysis, ataxia, numbness or tingling in the extremities. No change in bowel or bladder control. no limb weakness. no vision changes.   MUSCULOSKELETAL:  No muscle, back pain, joint pain or stiffness.  HEMATOLOGIC:  No anemia, bleeding or bruising.  LYMPHATICS:  No enlarged nodes. No history of splenectomy.  PSYCHIATRIC:  No history of depression or anxiety.  ENDOCRINOLOGIC:  No reports of sweating, cold or heat intolerance. No polyuria or polydipsia.      Vitals:  Vital Signs Last 24 Hrs  T(C): 36.2 (2024 10:34), Max: 36.7 (2024 12:48)  T(F): 97.2 (2024 10:34), Max: 98.1 (2024 12:48)  HR: 75 (2024 00:51) (68 - 78)  BP: 140/71 (2024 00:51) (117/68 - 147/79)  BP(mean): --  RR: 18 (2024 00:51) (18 - 18)  SpO2: 100% (2024 00:51) (100% - 100%)    Parameters below as of 2024 00:51  Patient On (Oxygen Delivery Method): room air        General Exam:   General Appearance: Appropriately dressed and in no acute distress       Head: Normocephalic, atraumatic and no dysmorphic features  Ear, Nose, and Throat: Moist mucous membranes  CVS: S1S2+  Resp: No SOB, no wheeze or rhonchi  GI: soft NT/ND  Extremities: No edema or cyanosis  Skin: No bruises or rashes     Neurological Exam:  Mental Status: Awake, alert and oriented x 3.  Able to follow simple and complex verbal commands. Able to name and repeat. fluent speech. No obvious aphasia or dysarthria noted.   Cranial Nerves: PERRL, EOMI, VFFC, no nystagmus or skew, sensation V1-V3 intact,  no obvious facial asymmetry, equal elevation of palate, scm/trap 5/5, tongue is midline on protrusion. . hearing is grossly intact.   Motor: Normal bulk, tone and strength throughout. Fine finger movements were intact and symmetric. no tremors or drift noted.    Sensation: Intact to light touch and pinprick throughout. no right/left confusion. no extinction to tactile on DSS. Romberg was negative.   Reflexes: 1+ throughout at biceps, brachioradialis, triceps, patellars and ankles bilaterally and equal. No clonus. R toe and L toe were both downgoing.  Coordination: No dysmetria on FNF   Gait: unsteady upon standing.     Data/Labs/Imaging which I personally reviewed.     Labs:     CBC Full  -  ( 2024 06:35 )  WBC Count : 4.69 K/uL  RBC Count : 4.19 M/uL  Hemoglobin : 12.8 g/dL  Hematocrit : 37.8 %  Platelet Count - Automated : 314 K/uL  Mean Cell Volume : 90.2 fL  Mean Cell Hemoglobin : 30.5 pg  Mean Cell Hemoglobin Concentration : 33.9 gm/dL  Auto Neutrophil # : x  Auto Lymphocyte # : x  Auto Monocyte # : x  Auto Eosinophil # : x  Auto Basophil # : x  Auto Neutrophil % : x  Auto Lymphocyte % : x  Auto Monocyte % : x  Auto Eosinophil % : x  Auto Basophil % : x    02-18    141  |  107  |  21  ----------------------------<  98  4.8   |  23  |  0.87    Ca    9.2      2024 06:35  Phos  4.2       Mg     2.10               Urinalysis Basic - ( 2024 06:35 )    Color: x / Appearance: x / SG: x / pH: x  Gluc: 98 mg/dL / Ketone: x  / Bili: x / Urobili: x   Blood: x / Protein: x / Nitrite: x   Leuk Esterase: x / RBC: x / WBC x   Sq Epi: x / Non Sq Epi: x / Bacteria: x     EXAM:  BRAIN MRI        PROCEDURE DATE:  2016      INTERPRETATION:  CLINICAL HISTORY: Ataxia, rule out CVA    COMPARISON: CT head dated 2016    TECHNIQUE: MRI brain: Multiplanar, multisequence MR imaging of the brain   are obtained without the administration of intravenous gadolinium.     FINDINGS:  There is no abnormal restricted diffusion to suggest acute infarction.   Scattered periventricular and subcortical white matter T2 /FLAIR   hyperintensities are seen without mass effect,nonspecific, likely   representing trace chronic microvascular changes.  Normal T2 flow-voids are seen within  the intracranial vasculature. The   lateral ventricles and cortical sulci are age appropriate in size and   configuration. There is no mass, mass effect extra-axial fluid   collection. There is no susceptibility artifact to suggest hemorrhage.   Midline structures are normal.     The patient is status post bilateral ocular lens replacement surgery.   Mild inflammatory mucosal changes areseen throughout the various   portions of the paranasal sinuses. The orbits and mastoid air cells are   otherwise unremarkable.    IMPRESSION: No acute infarction.          < from: CT Head No Cont (24 @ 17:29) >    ACC: [id number] EXAM:  CT BRAIN   ORDERED BY: LILIBETH PRETTY     PROCEDURE DATE:  2024          INTERPRETATION:  CLINICAL INFORMATION: Syncope.    COMPARISON: CT head of 2024.    PROCEDURE:  Noncontrast CT of the Head was performed from the skull base to the   vertex. Coronal and Sagittal reformats were obtained.    FINDINGS:    There is no acute intracranial hemorrhage, mass effect, midline shift,   extra-axial collection, hydrocephalus, or evidence of acute vascular   territorial infarction.    The visualized paranasal sinuses and mastoid air cells are clear. Status   post bilateral ocular lens replacement. Right frontal scalp soft tissue   swelling. No calvarial fracture.    IMPRESSION:    No acute intracranial hemorrhage or calvarial fracture.    < end of copied text >

## 2024-02-19 NOTE — PROGRESS NOTE ADULT - ASSESSMENT
79 y/o F PMHx diverticulosis, HTN not on medication presents c/o chest heaviness/pressure and an episode of presyncope admitted for further eval     Chest pain:    Tele  Cardio f/up appreciated  TTE/NST reviewed    Near syncope:    CT head: No acute CVA/Bleed  Neuro eval appreciated    GERD:    Cw Famotidine/Maalox    GPC in urine:    ID f/up appreciated  No need for antibiotics

## 2024-02-19 NOTE — CONSULT NOTE ADULT - REASON FOR ADMISSION
lightheadedness, presyncope, chest pressure

## 2024-02-19 NOTE — PROGRESS NOTE ADULT - ASSESSMENT
A/P    77 y/o F PMHx diverticulosis, HTN not on medication presents c/o chest heaviness/pressure and an episode of presyncope admitted for further eval     #Pre-syncope, chest pain  -episodes in setting of preceding chest pain  -possibly related to ischemic heart disease  -head ct ok  -r/o ischemic heart disease  -echo wnl  -stress no ischemia     #dizziness, orthostatic hypotension  -likely sec to hypovolemia   -hydrate  -echo nl lv fxn, valve fxn  -trend orthostatics   -neuro eval   -likely component of bppv playing role  -meclizine trial     #hypertension.   -monitor off meds for now       #uti  -id f/u    dvt ppx    dcp for abelino if stable    51 minutes spent on total encounter; more than 50% of the visit was spent counseling and/or coordinating care by the attending physician.

## 2024-02-20 ENCOUNTER — RESULT REVIEW (OUTPATIENT)
Age: 79
End: 2024-02-20

## 2024-02-20 ENCOUNTER — TRANSCRIPTION ENCOUNTER (OUTPATIENT)
Age: 79
End: 2024-02-20

## 2024-02-20 VITALS
TEMPERATURE: 98 F | HEART RATE: 78 BPM | SYSTOLIC BLOOD PRESSURE: 150 MMHG | DIASTOLIC BLOOD PRESSURE: 62 MMHG | RESPIRATION RATE: 18 BRPM | OXYGEN SATURATION: 100 %

## 2024-02-20 LAB
ANION GAP SERPL CALC-SCNC: 13 MMOL/L — SIGNIFICANT CHANGE UP (ref 7–14)
BUN SERPL-MCNC: 26 MG/DL — HIGH (ref 7–23)
CALCIUM SERPL-MCNC: 9.1 MG/DL — SIGNIFICANT CHANGE UP (ref 8.4–10.5)
CHLORIDE SERPL-SCNC: 105 MMOL/L — SIGNIFICANT CHANGE UP (ref 98–107)
CO2 SERPL-SCNC: 23 MMOL/L — SIGNIFICANT CHANGE UP (ref 22–31)
CREAT SERPL-MCNC: 0.93 MG/DL — SIGNIFICANT CHANGE UP (ref 0.5–1.3)
EGFR: 63 ML/MIN/1.73M2 — SIGNIFICANT CHANGE UP
FOLATE SERPL-MCNC: 10.6 NG/ML — SIGNIFICANT CHANGE UP (ref 3.1–17.5)
GLUCOSE SERPL-MCNC: 85 MG/DL — SIGNIFICANT CHANGE UP (ref 70–99)
HCT VFR BLD CALC: 36.3 % — SIGNIFICANT CHANGE UP (ref 34.5–45)
HGB BLD-MCNC: 11.8 G/DL — SIGNIFICANT CHANGE UP (ref 11.5–15.5)
MAGNESIUM SERPL-MCNC: 2.1 MG/DL — SIGNIFICANT CHANGE UP (ref 1.6–2.6)
MCHC RBC-ENTMCNC: 29.9 PG — SIGNIFICANT CHANGE UP (ref 27–34)
MCHC RBC-ENTMCNC: 32.5 GM/DL — SIGNIFICANT CHANGE UP (ref 32–36)
MCV RBC AUTO: 92.1 FL — SIGNIFICANT CHANGE UP (ref 80–100)
NRBC # BLD: 0 /100 WBCS — SIGNIFICANT CHANGE UP (ref 0–0)
NRBC # FLD: 0 K/UL — SIGNIFICANT CHANGE UP (ref 0–0)
PHOSPHATE SERPL-MCNC: 4.3 MG/DL — SIGNIFICANT CHANGE UP (ref 2.5–4.5)
PLATELET # BLD AUTO: 280 K/UL — SIGNIFICANT CHANGE UP (ref 150–400)
POTASSIUM SERPL-MCNC: 4.6 MMOL/L — SIGNIFICANT CHANGE UP (ref 3.5–5.3)
POTASSIUM SERPL-SCNC: 4.6 MMOL/L — SIGNIFICANT CHANGE UP (ref 3.5–5.3)
RBC # BLD: 3.94 M/UL — SIGNIFICANT CHANGE UP (ref 3.8–5.2)
RBC # FLD: 14.1 % — SIGNIFICANT CHANGE UP (ref 10.3–14.5)
SODIUM SERPL-SCNC: 141 MMOL/L — SIGNIFICANT CHANGE UP (ref 135–145)
TSH SERPL-MCNC: 1.29 UIU/ML — SIGNIFICANT CHANGE UP (ref 0.27–4.2)
VIT B12 SERPL-MCNC: 376 PG/ML — SIGNIFICANT CHANGE UP (ref 200–900)
WBC # BLD: 5.29 K/UL — SIGNIFICANT CHANGE UP (ref 3.8–10.5)
WBC # FLD AUTO: 5.29 K/UL — SIGNIFICANT CHANGE UP (ref 3.8–10.5)

## 2024-02-20 PROCEDURE — 93880 EXTRACRANIAL BILAT STUDY: CPT | Mod: 26

## 2024-02-20 RX ORDER — FAMOTIDINE 10 MG/ML
1 INJECTION INTRAVENOUS
Qty: 0 | Refills: 0 | DISCHARGE
Start: 2024-02-20

## 2024-02-20 RX ORDER — ACETAMINOPHEN 500 MG
2 TABLET ORAL
Qty: 0 | Refills: 0 | DISCHARGE
Start: 2024-02-20

## 2024-02-20 RX ORDER — LANOLIN ALCOHOL/MO/W.PET/CERES
1 CREAM (GRAM) TOPICAL
Qty: 0 | Refills: 0 | DISCHARGE
Start: 2024-02-20

## 2024-02-20 RX ORDER — MECLIZINE HCL 12.5 MG
1 TABLET ORAL
Qty: 15 | Refills: 0
Start: 2024-02-20 | End: 2024-02-24

## 2024-02-20 NOTE — DISCHARGE NOTE NURSING/CASE MANAGEMENT/SOCIAL WORK - PATIENT PORTAL LINK FT
You can access the FollowMyHealth Patient Portal offered by [facility] by registering at the following website: [email or web address]. By joining [employer]s FollowMyHealth portal, you will also be able to view your health information using other applications (apps) compatible with our system.

## 2024-02-20 NOTE — PROGRESS NOTE ADULT - SUBJECTIVE AND OBJECTIVE BOX
[facility], Division of Infectious Diseases  LIBIA Mackenzie Y. Patel, S. Shah, G. Stanley  [phone]  ([phone] - weekdays after 5pm and weekends)    Name: NAHED SHEPPARD  Age/Gender: 78y Female  MRN: [id number]    Interval History:  Notes reviewed.   No concerning overnight events.  Afebrile.   denies dysuria    Allergies: No Known Allergies      Objective:  Vitals:   T(F): 97.8 (02-19-24 @ 00:51), Max: 98.1 (02-18-24 @ 12:48)  HR: 75 (02-19-24 @ 00:51) (68 - 78)  BP: 140/71 (02-19-24 @ 00:51) (117/68 - 147/79)  RR: 18 (02-19-24 @ 00:51) (18 - 18)  SpO2: 100% (02-19-24 @ 00:51) (100% - 100%)  Physical Examination:  General: no acute distress  HEENT: anicteric  Cardio: S1, S2, normal rate  Resp: clear to auscultation bilaterally  Abd: soft, NT, ND, no suprapubic tenderness  Ext: no LE edema  Skin: warm, dry    Laboratory Studies:  CBC:                       12.8   4.69  )-----------( 314      ( 18 Feb 2024 06:35 )             37.8     WBC Trend:  4.69 02-18-24 @ 06:35  4.85 02-16-24 @ 14:01    CMP: 02-18    141  |  107  |  21  ----------------------------<  98  4.8   |  23  |  0.87    Ca    9.2      18 Feb 2024 06:35  Phos  4.2     02-18  Mg     2.10     02-18            Urinalysis Basic - ( 18 Feb 2024 06:35 )    Color: x / Appearance: x / SG: x / pH: x  Gluc: 98 mg/dL / Ketone: x  / Bili: x / Urobili: x   Blood: x / Protein: x / Nitrite: x   Leuk Esterase: x / RBC: x / WBC x   Sq Epi: x / Non Sq Epi: x / Bacteria: x      Microbiology: reviewed     Culture - Urine (collected 02-16-24 @ 16:54)  Source: Clean Catch Clean Catch (Midstream)  Preliminary Report (02-18-24 @ 16:12):    >100,000 CFU/ml Enterococcus faecalis        Radiology: reviewed     Medications:  acetaminophen     Tablet .. 650 milliGRAM(s) Oral every 6 hours PRN  aluminum hydroxide/magnesium hydroxide/simethicone Suspension 30 milliLiter(s) Oral every 4 hours PRN  enoxaparin Injectable 40 milliGRAM(s) SubCutaneous every 24 hours  famotidine    Tablet 20 milliGRAM(s) Oral daily  influenza  Vaccine (HIGH DOSE) 0.7 milliLiter(s) IntraMuscular once  melatonin 3 milliGRAM(s) Oral at bedtime PRN  ondansetron Injectable 4 milliGRAM(s) IV Push every 8 hours PRN  sodium chloride 0.9%. 1000 milliLiter(s) IV Continuous <Continuous>    Antimicrobials:  
CARDIOLOGY FOLLOW UP NOTE - DR. SIDDIQUI    Patient Name: NAHED SHEPPARD    Date of Service: 02-19-24 @ 12:12    Patient seen and examined  still c/o some dizziness, tinnitus b/l ears    Subjective:    cv: denies chest pain, dyspnea, palpitations, dizziness  pulmonary: denies cough  GI: denies abdominal pain, nausea, vomiting  vascular/legs: no edema   skin: no rash  ROS: otherwise negative   overnight events:      PHYSICAL EXAM:  T(C): 36.2 (02-19-24 @ 10:34), Max: 36.7 (02-18-24 @ 12:48)  HR: 75 (02-19-24 @ 00:51) (68 - 78)  BP: 140/71 (02-19-24 @ 00:51) (117/68 - 147/79)  RR: 18 (02-19-24 @ 00:51) (18 - 18)  SpO2: 100% (02-19-24 @ 00:51) (100% - 100%)  Wt(kg): --  I&O's Summary    Daily     Daily     Appearance: Normal	  Cardiovascular: Normal S1 S2,RRR, No JVD, No murmurs  Respiratory: Lungs clear to auscultation	  Gastrointestinal:  Soft, Non-tender, + BS	  Extremities: Normal range of motion, No clubbing, cyanosis or edema      Home Medications:      MEDICATIONS  (STANDING):  enoxaparin Injectable 40 milliGRAM(s) SubCutaneous every 24 hours  famotidine    Tablet 20 milliGRAM(s) Oral daily  influenza  Vaccine (HIGH DOSE) 0.7 milliLiter(s) IntraMuscular once  meclizine 12.5 milliGRAM(s) Oral every 8 hours  sodium chloride 0.9%. 1000 milliLiter(s) (100 mL/Hr) IV Continuous <Continuous>  sodium chloride 0.9%. 1000 milliLiter(s) (100 mL/Hr) IV Continuous <Continuous>      TELEMETRY: 	    ECG:  	  RADIOLOGY:   DIAGNOSTIC TESTING:  [ ] Echocardiogram:  [ ] Catheterization:  [ ] Stress Test:    OTHER: 	    LABS:	 	    CARDIAC MARKERS:        Troponin T, High Sensitivity Result: 10 ng/L (02-16 @ 15:40)  Troponin T, High Sensitivity Result: 9 ng/L (02-16 @ 14:01)                                12.8   4.69  )-----------( 314      ( 18 Feb 2024 06:35 )             37.8     02-18    141  |  107  |  21  ----------------------------<  98  4.8   |  23  |  0.87    Ca    9.2      18 Feb 2024 06:35  Phos  4.2     02-18  Mg     2.10     02-18      proBNP:     Lipid Profile:   HgA1c:     Creatinine: 0.87 mg/dL (02-18-24 @ 06:35)  Creatinine: 0.87 mg/dL (02-16-24 @ 14:01)            
CARDIOLOGY FOLLOW UP - Dr. Noe  DATE OF SERVICE: 2/20/24    CC no cp or sob   no dizziness       REVIEW OF SYSTEMS:  CONSTITUTIONAL: No fever, weight loss, or fatigue  RESPIRATORY: No cough, wheezing, chills or hemoptysis; No Shortness of Breath  CARDIOVASCULAR: No chest pain, palpitations, passing out, dizziness, or leg swelling  GASTROINTESTINAL: No abdominal or epigastric pain. No nausea, vomiting, or hematemesis; No diarrhea or constipation. No melena or hematochezia.  VASCULAR: No edema     PHYSICAL EXAM:  T(C): 36.5 (02-20-24 @ 10:01), Max: 36.7 (02-20-24 @ 01:06)  HR: 70 (02-20-24 @ 10:01) (63 - 91)  BP: 122/62 (02-20-24 @ 10:01) (122/62 - 137/66)  RR: 18 (02-20-24 @ 10:01) (15 - 19)  SpO2: 100% (02-20-24 @ 10:01) (99% - 100%)  Wt(kg): --  I&O's Summary      Appearance: Normal	  Cardiovascular: Normal S1 S2,RRR, No JVD, No murmurs  Respiratory: Lungs clear to auscultation	  Gastrointestinal:  Soft, Non-tender, + BS	  Extremities: Normal range of motion, No clubbing, cyanosis or edema      Home Medications:      MEDICATIONS  (STANDING):  enoxaparin Injectable 40 milliGRAM(s) SubCutaneous every 24 hours  famotidine    Tablet 20 milliGRAM(s) Oral daily  influenza  Vaccine (HIGH DOSE) 0.7 milliLiter(s) IntraMuscular once  meclizine 12.5 milliGRAM(s) Oral every 8 hours  sodium chloride 0.9%. 1000 milliLiter(s) (100 mL/Hr) IV Continuous <Continuous>      TELEMETRY: nsr	    ECG:  	  RADIOLOGY:   DIAGNOSTIC TESTING:  [ ] Echocardiogram:  [ ]  Catheterization:  [ ] Stress Test:    OTHER: 	    LABS:	 	    Troponin T, High Sensitivity Result: 10 ng/L (02-16 @ 15:40)  Troponin T, High Sensitivity Result: 9 ng/L (02-16 @ 14:01)                          11.8   5.29  )-----------( 280      ( 20 Feb 2024 05:59 )             36.3     02-20    141  |  105  |  26<H>  ----------------------------<  85  4.6   |  23  |  0.93    Ca    9.1      20 Feb 2024 05:59  Phos  4.3     02-20  Mg     2.10     02-20              
[facility], Division of Infectious Diseases  LIBIA Mackenzie Y. Patel, S. Shah, G. Stanley  [phone]  ([phone] - weekdays after 5pm and weekends)    Name: NAHED SHEPPARD  Age/Gender: 78y Female  MRN: [id number]    Interval History:  Notes reviewed.   No concerning overnight events.  Afebrile.   denies  symptoms  no flank pain  seen by neurology yesterday    Allergies: No Known Allergies      Objective:  Vitals:   T(F): 97.7 (02-20-24 @ 10:01), Max: 98.1 (02-20-24 @ 01:06)  HR: 70 (02-20-24 @ 10:01) (63 - 91)  BP: 122/62 (02-20-24 @ 10:01) (122/62 - 137/66)  RR: 18 (02-20-24 @ 10:01) (15 - 19)  SpO2: 100% (02-20-24 @ 10:01) (99% - 100%)  Physical Examination:  General: no acute distress  HEENT: anicteric  Cardio: S1, S2, normal rate  Resp: clear to auscultation bilaterally  Abd: soft, NT, ND, no suprapubic tenderness  back: no CVA tenderness  Ext: no LE edema  Skin: warm, dry    Laboratory Studies:  CBC:                       11.8   5.29  )-----------( 280      ( 20 Feb 2024 05:59 )             36.3     WBC Trend:  5.29 02-20-24 @ 05:59  4.69 02-18-24 @ 06:35  4.85 02-16-24 @ 14:01    CMP: 02-20    141  |  105  |  26<H>  ----------------------------<  85  4.6   |  23  |  0.93    Ca    9.1      20 Feb 2024 05:59  Phos  4.3     02-20  Mg     2.10     02-20            Urinalysis Basic - ( 20 Feb 2024 05:59 )    Color: x / Appearance: x / SG: x / pH: x  Gluc: 85 mg/dL / Ketone: x  / Bili: x / Urobili: x   Blood: x / Protein: x / Nitrite: x   Leuk Esterase: x / RBC: x / WBC x   Sq Epi: x / Non Sq Epi: x / Bacteria: x      Microbiology: reviewed     Culture - Urine (collected 02-16-24 @ 16:54)  Source: Clean Catch Clean Catch (Midstream)  Final Report (02-19-24 @ 14:31):    >100,000 CFU/ml Enterococcus faecalis  Organism: Enterococcus faecalis (02-19-24 @ 14:31)  Organism: Enterococcus faecalis (02-19-24 @ 14:31)      Method Type: STEVEN      -  Ampicillin: S <=2 Predicts results to ampicillin/sulbactam, amoxacillin-clavulanate and  piperacillin-tazobactam.      -  Ciprofloxacin: S <=1      -  Levofloxacin: S <=0.5      -  Nitrofurantoin: S <=32 Should not be used to treat pyelonephritis.      -  Tetracycline: R >8      -  Vancomycin: S 2        Radiology: reviewed     Medications:  acetaminophen     Tablet .. 650 milliGRAM(s) Oral every 6 hours PRN  aluminum hydroxide/magnesium hydroxide/simethicone Suspension 30 milliLiter(s) Oral every 4 hours PRN  enoxaparin Injectable 40 milliGRAM(s) SubCutaneous every 24 hours  famotidine    Tablet 20 milliGRAM(s) Oral daily  influenza  Vaccine (HIGH DOSE) 0.7 milliLiter(s) IntraMuscular once  meclizine 12.5 milliGRAM(s) Oral every 8 hours  melatonin 3 milliGRAM(s) Oral at bedtime PRN  ondansetron Injectable 4 milliGRAM(s) IV Push every 8 hours PRN  sodium chloride 0.9%. 1000 milliLiter(s) IV Continuous <Continuous>    Antimicrobials:  
Patient is a 78y old  Female who presents with a chief complaint of lightheadedness, presyncope, chest pressure (19 Feb 2024 12:12)      SUBJECTIVE / OVERNIGHT EVENTS:    Events noted.  CONSTITUTIONAL: No fever,  or fatigue  RESPIRATORY: No cough, wheezing,  No shortness of breath  CARDIOVASCULAR: No chest pain, palpitations, dizziness, or leg swelling  GASTROINTESTINAL: No abdominal or epigastric pain. No nausea, vomiting.  NEUROLOGICAL: Dizziness    MEDICATIONS  (STANDING):  enoxaparin Injectable 40 milliGRAM(s) SubCutaneous every 24 hours  famotidine    Tablet 20 milliGRAM(s) Oral daily  influenza  Vaccine (HIGH DOSE) 0.7 milliLiter(s) IntraMuscular once  meclizine 12.5 milliGRAM(s) Oral every 8 hours  sodium chloride 0.9%. 1000 milliLiter(s) (100 mL/Hr) IV Continuous <Continuous>    MEDICATIONS  (PRN):  acetaminophen     Tablet .. 650 milliGRAM(s) Oral every 6 hours PRN Temp greater or equal to 38C (100.4F), Mild Pain (1 - 3)  aluminum hydroxide/magnesium hydroxide/simethicone Suspension 30 milliLiter(s) Oral every 4 hours PRN Dyspepsia  melatonin 3 milliGRAM(s) Oral at bedtime PRN Insomnia  ondansetron Injectable 4 milliGRAM(s) IV Push every 8 hours PRN Nausea and/or Vomiting        CAPILLARY BLOOD GLUCOSE      POCT Blood Glucose.: 88 mg/dL (19 Feb 2024 09:01)    I&O's Summary      T(C): 36.2 (02-19-24 @ 10:34), Max: 36.6 (02-19-24 @ 00:51)  HR: 91 (02-19-24 @ 20:21) (75 - 91)  BP: 140/71 (02-19-24 @ 00:51) (140/71 - 140/71)  RR: 19 (02-19-24 @ 20:21) (18 - 19)  SpO2: 99% (02-19-24 @ 20:21) (99% - 100%)    PHYSICAL EXAM:  GENERAL: NAD  NECK: Supple, No JVD  CHEST/LUNG: Clear to auscultation bilaterally; No wheezing.  HEART: Regular rate and rhythm; No murmurs, rubs, or gallops  ABDOMEN: Soft, Nontender, Nondistended; Bowel sounds present  EXTREMITIES:   No edema  NEUROLOGY: AAO X 3      LABS:                        12.8   4.69  )-----------( 314      ( 18 Feb 2024 06:35 )             37.8     02-18    141  |  107  |  21  ----------------------------<  98  4.8   |  23  |  0.87    Ca    9.2      18 Feb 2024 06:35  Phos  4.2     02-18  Mg     2.10     02-18            Urinalysis Basic - ( 18 Feb 2024 06:35 )    Color: x / Appearance: x / SG: x / pH: x  Gluc: 98 mg/dL / Ketone: x  / Bili: x / Urobili: x   Blood: x / Protein: x / Nitrite: x   Leuk Esterase: x / RBC: x / WBC x   Sq Epi: x / Non Sq Epi: x / Bacteria: x      CAPILLARY BLOOD GLUCOSE      POCT Blood Glucose.: 88 mg/dL (19 Feb 2024 09:01)        RADIOLOGY & ADDITIONAL TESTS:    Imaging Personally Reviewed:    Consultant(s) Notes Reviewed:      Care Discussed with Consultants/Other Providers:    Juan David Das MD, CMD, FACP    [address] [place] [address]  Office Tel: [phone]  Cell: [phone]  
CARDIOLOGY FOLLOW UP NOTE - DR. SIDDIQUI    Patient Name: NAHED SHEPPARD    Date of Service: 02-18-24 @ 11:28    Patient seen and examined    Subjective:    cv: denies chest pain, dyspnea, palpitations, dizziness  pulmonary: denies cough  GI: denies abdominal pain, nausea, vomiting  vascular/legs: no edema   skin: no rash  ROS: otherwise negative   overnight events:      PHYSICAL EXAM:  T(C): 36.7 (02-18-24 @ 06:00), Max: 36.8 (02-17-24 @ 17:34)  HR: 69 (02-18-24 @ 06:00) (63 - 78)  BP: 117/98 (02-18-24 @ 06:00) (117/66 - 149/84)  RR: 15 (02-18-24 @ 06:00) (15 - 18)  SpO2: 100% (02-18-24 @ 06:00) (98% - 100%)  Wt(kg): --  I&O's Summary    17 Feb 2024 07:01  -  18 Feb 2024 07:00  --------------------------------------------------------  IN: 1000 mL / OUT: 0 mL / NET: 1000 mL      Daily     Daily     Appearance: Normal	  Cardiovascular: Normal S1 S2,RRR, No JVD, No murmurs  Respiratory: Lungs clear to auscultation	  Gastrointestinal:  Soft, Non-tender, + BS	  Extremities: Normal range of motion, No clubbing, cyanosis or edema      Home Medications:      MEDICATIONS  (STANDING):  enoxaparin Injectable 40 milliGRAM(s) SubCutaneous every 24 hours  famotidine    Tablet 20 milliGRAM(s) Oral daily  influenza  Vaccine (HIGH DOSE) 0.7 milliLiter(s) IntraMuscular once  sodium chloride 0.9%. 1000 milliLiter(s) (100 mL/Hr) IV Continuous <Continuous>      TELEMETRY: 	    ECG:  	  RADIOLOGY:   DIAGNOSTIC TESTING:  [ ] Echocardiogram:  [ ] Catheterization:  [ ] Stress Test:    OTHER: 	    LABS:	 	    CARDIAC MARKERS:        Troponin T, High Sensitivity Result: 10 ng/L (02-16 @ 15:40)  Troponin T, High Sensitivity Result: 9 ng/L (02-16 @ 14:01)                                12.8   4.69  )-----------( 314      ( 18 Feb 2024 06:35 )             37.8     02-18    141  |  107  |  21  ----------------------------<  98  4.8   |  23  |  0.87    Ca    9.2      18 Feb 2024 06:35  Phos  4.2     02-18  Mg     2.10     02-18    TPro  7.8  /  Alb  4.1  /  TBili  0.4  /  DBili  x   /  AST  13  /  ALT  6   /  AlkPhos  70  02-16    proBNP:     Lipid Profile:   HgA1c:     Creatinine: 0.87 mg/dL (02-18-24 @ 06:35)  Creatinine: 0.87 mg/dL (02-16-24 @ 14:01)            
Patient is a 78y old  Female who presents with a chief complaint of lightheadedness, presyncope, chest pressure (18 Feb 2024 15:08)      SUBJECTIVE / OVERNIGHT EVENTS:    Events noted.  CONSTITUTIONAL: No fever,  or fatigue  RESPIRATORY: No cough, wheezing,  No shortness of breath  CARDIOVASCULAR: No chest pain, palpitations, dizziness, or leg swelling  GASTROINTESTINAL: No abdominal or epigastric pain. No nausea, vomiting.  NEUROLOGICAL: No headache    MEDICATIONS  (STANDING):  enoxaparin Injectable 40 milliGRAM(s) SubCutaneous every 24 hours  famotidine    Tablet 20 milliGRAM(s) Oral daily  influenza  Vaccine (HIGH DOSE) 0.7 milliLiter(s) IntraMuscular once  sodium chloride 0.9%. 1000 milliLiter(s) (100 mL/Hr) IV Continuous <Continuous>    MEDICATIONS  (PRN):  acetaminophen     Tablet .. 650 milliGRAM(s) Oral every 6 hours PRN Temp greater or equal to 38C (100.4F), Mild Pain (1 - 3)  aluminum hydroxide/magnesium hydroxide/simethicone Suspension 30 milliLiter(s) Oral every 4 hours PRN Dyspepsia  melatonin 3 milliGRAM(s) Oral at bedtime PRN Insomnia  ondansetron Injectable 4 milliGRAM(s) IV Push every 8 hours PRN Nausea and/or Vomiting        CAPILLARY BLOOD GLUCOSE        I&O's Summary    17 Feb 2024 07:01  -  18 Feb 2024 07:00  --------------------------------------------------------  IN: 1000 mL / OUT: 0 mL / NET: 1000 mL        T(C): 36.3 (02-18-24 @ 15:58), Max: 36.7 (02-18-24 @ 01:36)  HR: 78 (02-18-24 @ 15:58) (63 - 78)  BP: 147/79 (02-18-24 @ 15:58) (117/66 - 147/79)  RR: 18 (02-18-24 @ 15:58) (15 - 18)  SpO2: 100% (02-18-24 @ 15:58) (98% - 100%)    PHYSICAL EXAM:  GENERAL: NAD  NECK: Supple, No JVD  CHEST/LUNG: Clear to auscultation bilaterally; No wheezing.  HEART: Regular rate and rhythm; No murmurs, rubs, or gallops  ABDOMEN: Soft, Nontender, Nondistended; Bowel sounds present  EXTREMITIES:   No edema  NEUROLOGY: AAO X 3      LABS:                        12.8   4.69  )-----------( 314      ( 18 Feb 2024 06:35 )             37.8     02-18    141  |  107  |  21  ----------------------------<  98  4.8   |  23  |  0.87    Ca    9.2      18 Feb 2024 06:35  Phos  4.2     02-18  Mg     2.10     02-18            Urinalysis Basic - ( 18 Feb 2024 06:35 )    Color: x / Appearance: x / SG: x / pH: x  Gluc: 98 mg/dL / Ketone: x  / Bili: x / Urobili: x   Blood: x / Protein: x / Nitrite: x   Leuk Esterase: x / RBC: x / WBC x   Sq Epi: x / Non Sq Epi: x / Bacteria: x      CAPILLARY BLOOD GLUCOSE            RADIOLOGY & ADDITIONAL TESTS:    Imaging Personally Reviewed:    Consultant(s) Notes Reviewed:      Care Discussed with Consultants/Other Providers:    Juan David Das MD, CMD, FACP    [address] [place] [address]  Office Tel: [phone]  Cell: [phone]

## 2024-02-20 NOTE — DISCHARGE NOTE NURSING/CASE MANAGEMENT/SOCIAL WORK - NSDCPEFALRISK_GEN_ALL_CORE
For information on Fall & Injury Prevention, visit: [email or web address] OR  [email or web address] OR  https://www.cdc.gov/steadi/patient.html

## 2024-02-20 NOTE — PROGRESS NOTE ADULT - TIME BILLING
Agree with above ACP note.  cv stable  improved sx with meclizine   neuro wval noted  echo, stess nl  stable for dcp today

## 2024-02-20 NOTE — PROGRESS NOTE ADULT - REASON FOR ADMISSION
lightheadedness, presyncope, chest pressure

## 2024-02-20 NOTE — PROGRESS NOTE ADULT - ASSESSMENT
77 y/o F PMHx diverticulosis, HTN not on medication presents c/o chest heaviness/pressure and an episode of presyncope admitted for further eval   ID c/s for GPC in urine    asymptomatic bacteruria   UA w/ no pyuria, urine cx w/ amp sensitive E faecalis  No urinary symptoms   has urinary incontinence on occasion, however, patient states this has been chronic since her hysterectomy  Denies fever/chills/dysuria; no suprapubic tenderness    Recommendations:   continue off antibiotics  syncope work-up per primary team/ cards    Pepe Angel M.D.  [facility], Division of Infectious Diseases  [phone]  After 5pm on weekdays and all day on weekends - please call [phone]

## 2024-02-20 NOTE — PROGRESS NOTE ADULT - ASSESSMENT
A/P    77 y/o F PMHx diverticulosis, HTN not on medication presents c/o chest heaviness/pressure and an episode of presyncope admitted for further eval     #Pre-syncope, chest pain  -episodes in setting of preceding chest pain  -possibly related to ischemic heart disease  -head ct ok  -ruled out ischemic heart disease  -echo wnl  -stress no ischemia     #dizziness, orthostatic hypotension  -likely sec to hypovolemia   -hydrate  -echo nl lv fxn, valve fxn  -orthostatics neg   -neuro eval noted -outpt mri/ outpt  vestibular therapy  -likely component of bppv playing role  -c/w meclizine -- symptoms much improved     #hypertension.   -monitor off meds for now     #uti  -id f/u    dvt ppx    dcp for TODAY    A/P    79 y/o F PMHx diverticulosis, HTN not on medication presents c/o chest heaviness/pressure and an episode of presyncope admitted for further eval     #Pre-syncope, chest pain  -episodes in setting of preceding chest pain  -possibly related to ischemic heart disease  -head ct ok  -ruled out ischemic heart disease  -echo wnl  -stress no ischemia     #dizziness, orthostatic hypotension  -likely sec to hypovolemia   -hydrate  -echo nl lv fxn, valve fxn  -orthostatics now neg   -neuro eval noted -outpt mri/ outpt  vestibular therapy  -likely component of bppv playing role  -c/w meclizine -- symptoms much improved     #hypertension.   -monitor off meds for now     #uti  -id f/u    dvt ppx    dcp for TODAY

## 2024-02-20 NOTE — DISCHARGE NOTE NURSING/CASE MANAGEMENT/SOCIAL WORK - NSDCVIVACCINE_GEN_ALL_CORE_FT
Tdap; 25-Jun-2016 15:39; Mukesh Chaidez (RN); [employer]; [id number]; IntraMuscular; Deltoid Right.; 0.5 milliLiter(s); VIS (VIS Published: 09-May-2013, VIS Presented: 25-Jun-2016);

## 2024-03-11 NOTE — PATIENT PROFILE ADULT - FALL HARM RISK - ATTEMPT OOB
to the ED prior to their follow-up appointment should their condition change or symptoms worsen.     I have answered all questions to the patient's satisfaction. Strict return precautions given. She and/or family conveyed understanding and agreement with care plan. Vital signs stable for discharge.     PLAN  1. Return precautions as discussed with patient and available family/caregiver.     2. DISCHARGE MEDICATIONS:     Medication List        START taking these medications      acetaminophen 160 MG/5ML suspension  Commonly known as: Tylenol Childrens  Take 8.48 mLs by mouth every 6 hours as needed for Fever or Pain     ibuprofen 100 MG/5ML suspension  Commonly known as: Childrens Advil  Take 9.05 mLs by mouth every 6 hours as needed for Fever or Pain               Where to Get Your Medications        These medications were sent to [facility]/pharmacy #[id number] - [place], [place] - [address] - P [phone] - F [phone]  [address] [facility] [address]      Phone: [phone]   acetaminophen 160 MG/5ML suspension  ibuprofen 100 MG/5ML suspension           3. PATIENT REFERRED TO:  [facility] EMERGENCY DEPT  [address] [place] [address]  [phone]    As needed, If symptoms worsen                            CLINICAL IMPRESSION     1. Fall by pediatric patient, initial encounter    2. Contusion of nose, initial encounter    3. Epistaxis      Attestation:  I am the attending of record for this patient. I personally performed the services described in this documentation on this date, 3/10/2024 for patient, Nixon Armendariz. I have reviewed the chart and verified that the record is accurate and complete.      Harjit Jarrell MD (Electronic Signature)         Harjit Jarrell MD  03/10/24 [id number]     No

## 2024-12-06 NOTE — DISCHARGE NOTE NURSING/CASE MANAGEMENT/SOCIAL WORK - NSDCFUADDAPPT_GEN_ALL_CORE_FT
Follow up with your primary care physician for further monitoring in 1-2 weeks. Please call to arrange appointment. 
Patient requests all Lab, Cardiology, and Radiology Results on their Discharge Instructions

## 2024-12-17 ENCOUNTER — EMERGENCY (EMERGENCY)
Facility: HOSPITAL | Age: 79
LOS: 1 days | Discharge: HOME | End: 2024-12-17
Attending: STUDENT IN AN ORGANIZED HEALTH CARE EDUCATION/TRAINING PROGRAM
Payer: MEDICARE

## 2024-12-17 VITALS
TEMPERATURE: 98 F | OXYGEN SATURATION: 97 % | RESPIRATION RATE: 17 BRPM | WEIGHT: 169.98 LBS | DIASTOLIC BLOOD PRESSURE: 87 MMHG | HEIGHT: 63 IN | SYSTOLIC BLOOD PRESSURE: 120 MMHG | HEART RATE: 101 BPM

## 2024-12-17 LAB
ALBUMIN SERPL ELPH-MCNC: 4.2 G/DL — SIGNIFICANT CHANGE UP (ref 3.3–5)
ALP SERPL-CCNC: 73 U/L — SIGNIFICANT CHANGE UP (ref 40–120)
ALT FLD-CCNC: 6 U/L — LOW (ref 10–45)
ANION GAP SERPL CALC-SCNC: 12 MMOL/L — SIGNIFICANT CHANGE UP (ref 5–17)
AST SERPL-CCNC: 12 U/L — SIGNIFICANT CHANGE UP (ref 10–40)
BASOPHILS # BLD AUTO: 0.03 K/UL — SIGNIFICANT CHANGE UP (ref 0–0.2)
BASOPHILS NFR BLD AUTO: 0.6 % — SIGNIFICANT CHANGE UP (ref 0–2)
BILIRUB SERPL-MCNC: 0.4 MG/DL — SIGNIFICANT CHANGE UP (ref 0.2–1.2)
BUN SERPL-MCNC: 18 MG/DL — SIGNIFICANT CHANGE UP (ref 7–23)
CALCIUM SERPL-MCNC: 9.9 MG/DL — SIGNIFICANT CHANGE UP (ref 8.4–10.5)
CHLORIDE SERPL-SCNC: 104 MMOL/L — SIGNIFICANT CHANGE UP (ref 96–108)
CO2 SERPL-SCNC: 25 MMOL/L — SIGNIFICANT CHANGE UP (ref 22–31)
CREAT SERPL-MCNC: 1.04 MG/DL — SIGNIFICANT CHANGE UP (ref 0.5–1.3)
D DIMER BLD IA.RAPID-MCNC: 856 NG/ML DDU — HIGH
EGFR: 55 ML/MIN/1.73M2 — LOW
EOSINOPHIL # BLD AUTO: 0.22 K/UL — SIGNIFICANT CHANGE UP (ref 0–0.5)
EOSINOPHIL NFR BLD AUTO: 4 % — SIGNIFICANT CHANGE UP (ref 0–6)
GAS PNL BLDV: SIGNIFICANT CHANGE UP
GLUCOSE SERPL-MCNC: 102 MG/DL — HIGH (ref 70–99)
HCT VFR BLD CALC: 35.3 % — SIGNIFICANT CHANGE UP (ref 34.5–45)
HGB BLD-MCNC: 11.5 G/DL — SIGNIFICANT CHANGE UP (ref 11.5–15.5)
IMM GRANULOCYTES NFR BLD AUTO: 0.4 % — SIGNIFICANT CHANGE UP (ref 0–0.9)
LIDOCAIN IGE QN: 20 U/L — SIGNIFICANT CHANGE UP (ref 7–60)
LYMPHOCYTES # BLD AUTO: 0.75 K/UL — LOW (ref 1–3.3)
LYMPHOCYTES # BLD AUTO: 13.8 % — SIGNIFICANT CHANGE UP (ref 13–44)
MCHC RBC-ENTMCNC: 30.3 PG — SIGNIFICANT CHANGE UP (ref 27–34)
MCHC RBC-ENTMCNC: 32.6 G/DL — SIGNIFICANT CHANGE UP (ref 32–36)
MCV RBC AUTO: 92.9 FL — SIGNIFICANT CHANGE UP (ref 80–100)
MONOCYTES # BLD AUTO: 0.45 K/UL — SIGNIFICANT CHANGE UP (ref 0–0.9)
MONOCYTES NFR BLD AUTO: 8.3 % — SIGNIFICANT CHANGE UP (ref 2–14)
NEUTROPHILS # BLD AUTO: 3.98 K/UL — SIGNIFICANT CHANGE UP (ref 1.8–7.4)
NEUTROPHILS NFR BLD AUTO: 72.9 % — SIGNIFICANT CHANGE UP (ref 43–77)
NRBC # BLD: 0 /100 WBCS — SIGNIFICANT CHANGE UP (ref 0–0)
PLATELET # BLD AUTO: 296 K/UL — SIGNIFICANT CHANGE UP (ref 150–400)
POTASSIUM SERPL-MCNC: 4.3 MMOL/L — SIGNIFICANT CHANGE UP (ref 3.5–5.3)
POTASSIUM SERPL-SCNC: 4.3 MMOL/L — SIGNIFICANT CHANGE UP (ref 3.5–5.3)
PROT SERPL-MCNC: 7.6 G/DL — SIGNIFICANT CHANGE UP (ref 6–8.3)
RBC # BLD: 3.8 M/UL — SIGNIFICANT CHANGE UP (ref 3.8–5.2)
RBC # FLD: 13.7 % — SIGNIFICANT CHANGE UP (ref 10.3–14.5)
SODIUM SERPL-SCNC: 141 MMOL/L — SIGNIFICANT CHANGE UP (ref 135–145)
TROPONIN T, HIGH SENSITIVITY RESULT: 10 NG/L — SIGNIFICANT CHANGE UP (ref 0–51)
TROPONIN T, HIGH SENSITIVITY RESULT: 12 NG/L — SIGNIFICANT CHANGE UP (ref 0–51)
WBC # BLD: 5.45 K/UL — SIGNIFICANT CHANGE UP (ref 3.8–10.5)
WBC # FLD AUTO: 5.45 K/UL — SIGNIFICANT CHANGE UP (ref 3.8–10.5)

## 2024-12-17 PROCEDURE — 93288 INTERROG EVL PM/LDLS PM IP: CPT | Mod: 26

## 2024-12-17 PROCEDURE — 71046 X-RAY EXAM CHEST 2 VIEWS: CPT

## 2024-12-17 PROCEDURE — 85018 HEMOGLOBIN: CPT

## 2024-12-17 PROCEDURE — 93005 ELECTROCARDIOGRAM TRACING: CPT

## 2024-12-17 PROCEDURE — 85014 HEMATOCRIT: CPT

## 2024-12-17 PROCEDURE — 82803 BLOOD GASES ANY COMBINATION: CPT

## 2024-12-17 PROCEDURE — 84484 ASSAY OF TROPONIN QUANT: CPT

## 2024-12-17 PROCEDURE — 71275 CT ANGIOGRAPHY CHEST: CPT | Mod: 26,MC

## 2024-12-17 PROCEDURE — 82330 ASSAY OF CALCIUM: CPT

## 2024-12-17 PROCEDURE — 71046 X-RAY EXAM CHEST 2 VIEWS: CPT | Mod: 26

## 2024-12-17 PROCEDURE — 83605 ASSAY OF LACTIC ACID: CPT

## 2024-12-17 PROCEDURE — 85025 COMPLETE CBC W/AUTO DIFF WBC: CPT

## 2024-12-17 PROCEDURE — 82947 ASSAY GLUCOSE BLOOD QUANT: CPT

## 2024-12-17 PROCEDURE — 99285 EMERGENCY DEPT VISIT HI MDM: CPT

## 2024-12-17 PROCEDURE — 84132 ASSAY OF SERUM POTASSIUM: CPT

## 2024-12-17 PROCEDURE — 84295 ASSAY OF SERUM SODIUM: CPT

## 2024-12-17 PROCEDURE — 85730 THROMBOPLASTIN TIME PARTIAL: CPT

## 2024-12-17 PROCEDURE — 99285 EMERGENCY DEPT VISIT HI MDM: CPT | Mod: 25

## 2024-12-17 PROCEDURE — 83690 ASSAY OF LIPASE: CPT

## 2024-12-17 PROCEDURE — 36415 COLL VENOUS BLD VENIPUNCTURE: CPT

## 2024-12-17 PROCEDURE — 80053 COMPREHEN METABOLIC PANEL: CPT

## 2024-12-17 PROCEDURE — 82435 ASSAY OF BLOOD CHLORIDE: CPT

## 2024-12-17 PROCEDURE — 85610 PROTHROMBIN TIME: CPT

## 2024-12-17 PROCEDURE — 71275 CT ANGIOGRAPHY CHEST: CPT | Mod: MC

## 2024-12-17 PROCEDURE — 85379 FIBRIN DEGRADATION QUANT: CPT

## 2024-12-17 NOTE — ED PROVIDER NOTE - OBJECTIVE STATEMENT
79-year-old female past medical history of bradycardia status post permanent pacemaker placement 1 week ago at [facility]. Betsy Mccoy presents ED brought in by EMS for chest tightness with associated shortness of breath.  Patient states several hours ago noticed chest tightness with shortness of breath, denies history of CAD or stents.  Denies associated symptoms fever chills nausea vomiting diarrhea abdominal pain dysuria hematuria.  Pain constant, nonradiating, no associate diaphoresis.

## 2024-12-17 NOTE — ED PROVIDER NOTE - NSFOLLOWUPINSTRUCTIONS_ED_ALL_ED_FT
You were seen today for chest pain shortness of breath and palpitations.  You had your pacemaker interrogated which showed atrial tachycardia (elevated heart rate).  You need to follow-up with your cardiologist.  It is very important that you continue taking your daily metoprolol 50 mg.    Please return to the emergency department for any chest pain or palpitations that are unresolved.  Continue to take the remaining of your daily medications    Chest Pain    Chest pain can be caused by many different conditions which may or may not be dangerous. Causes include heartburn, lung infections, heart attack, blood clot in lungs, skin infections, strain or damage to muscle, cartilage, or bones, etc. In addition to a history and physical examination, an electrocardiogram (ECG) or other lab tests may have been performed to determine the cause of your chest pain. Follow up with your primary care provider or with a cardiologist as instructed.     SEEK IMMEDIATE MEDICAL CARE IF YOU HAVE ANY OF THE FOLLOWING SYMPTOMS: worsening chest pain, coughing up blood, unexplained back/neck/jaw pain, severe abdominal pain, dizziness or lightheadedness, fainting, shortness of breath, sweaty or clammy skin, vomiting, or racing heart beat. These symptoms may represent a serious problem that is an emergency. Do not wait to see if the symptoms will go away. Get medical help right away. Call 911 and do not drive yourself to the hospital.

## 2024-12-17 NOTE — ED PROVIDER NOTE - CLINICAL SUMMARY MEDICAL DECISION MAKING FREE TEXT BOX
79-year-old female past medical history of bradycardia status post permanent pacemaker placement 1 week ago at [facility] Dr. Betsy Mccoy presents ED brought in by EMS for chest tightness with associated shortness of breath.  Patient states several hours ago noticed chest tightness with shortness of breath, denies history of CAD or stents.  Denies associated symptoms fever chills nausea vomiting diarrhea abdominal pain dysuria hematuria.  Pain constant, nonradiating, no associate diaphoresis.    VSS. Clinically stable. EKG wnl w no ST elevations or T wave inversions, sinus. PE, well appearing, no acute distress, AAOx3. NCAT, EOMI, normal conjunctiva, mucous membranes moist, LCTAB no w/r/c, no MRG, RRR, abd NDNT, no rebound tenderness or guarding, no CVA ttp, no focal neuro deficits, neurovascularly intact, no bruising, rashes, or erythema. Suspicion for acs vs palpitations 2/2 pacemaker malfunction. Will screen for ACS (troponin), anemia/electrolytes, and chest x ray to screen for cardiopulmonary pathology, cards for pacemaker interrogation.

## 2024-12-17 NOTE — ED ADULT NURSE REASSESSMENT NOTE - NS ED NURSE REASSESS COMMENT FT1
Report received from DON Mayorga. Pt is observed sitting up in stretcher conversing with RN at this time. Pt breathing spontaneous and unlabored. Pt updated on plan of care and awaiting dispo at this time, denies chest pain, states pacemaker is causing pressure and is uncomfortable. Safety and comfort measures maintained. Call bell within reach. Pacemaker being interrogated at bedside at time of meeting.

## 2024-12-17 NOTE — PROCEDURE NOTE - ADDITIONAL PROCEDURE DETAILS
Reason for interrogation: Palpitations  Date of implant: 12/11/24  Battery: 100%  Ap: 1%, : 1%  Events: 7 HVR episodes on 12/17 that appear to be runs of 1:1 atrial tachycardia, longest duration of 28s  Normal device function  Not PPM dependent

## 2024-12-17 NOTE — ED PROVIDER NOTE - PATIENT PORTAL LINK FT
You can access the FollowMyHealth Patient Portal offered by [facility] by registering at the following website: [email or web address]. By joining [facility]’s FollowMyHealth portal, you will also be able to view your health information using other applications (apps) compatible with our system.

## 2024-12-17 NOTE — ED PROVIDER NOTE - ATTENDING CONTRIBUTION TO CARE
79F w/ hx of bradycardia s/p PM done 1 week ago at [facility] Dr. Betsy Mccoy here w/ chest tightness and SOB w/ associated near syncope episode that occurred around noon. pt w/ no f/c/n/v/diaphoresis, pain on the L side, no trauma, no falls, pt states she didn't feel well so called ffam who called ambulance. pt w/ no cp currently, nontoxic appearing, hd stable, I have reviewed the triage vital signs. Const: no acute distress, Well-developed, Eyes: no conjunctival injection and no scleral icterus ENMT: Moist mucus membranes, CVS:pcm pocket in the L chest wall w/ no surrounding erythema/ecchymosis, no open wounds +S1/S2, radial/DP pulse 2+ bilaterally RESP: Unlabored respiratory effort, Clear to auscultation bilaterally GI: Nontender/Nondistended soft abdomen, no CVA tenderness MSK: Extremities w/o deformity or ttp, Psych: Awake, Alert, & Orientedx3;  Appropriate mood and affect, cooperative Plan for labs and reassessment will interrogate PCM as pt w/ episode of near syncope w/ cp and sob, that occurred transiently, will see if any abnl rhythm give recent procedure, ddimer sent which is posistive by age adjusted, will obtain pe study possible electrolyte abnl

## 2024-12-17 NOTE — ED PROVIDER NOTE - PROGRESS NOTE DETAILS
Susan Valenzuela, PGY2    messaged cards in regards to pacemaker investigation. awaiting response Dr. Kitchen Note: s/o from Dr. Escobar pending ct results.  Delays in ct imaging due to surge.  Pt made aware, remains stable, pulse ox normal, noted periods of atrial tachycardia to 130s, pt not compliant with her meds including metoprolol, had patient take her 50mg metoprolol and 30 minutes later, tele showing [id number] and much reduced PVCs and no runs of Atrial tachycardia.  Awaiting cta to r/o PE and if negative, s/o was patient can likely be discharged. Jesus Osman DO (PGY3)  Received signout on this patient, 79-year-old female history of pacemaker placement presenting for chest tightness associated with shortness of breath and palpitations.  Patient had pacemaker interrogated which showed atrial tachycardia.  Patient has been noncompliant with her 50 mg of metoprolol.  Patient received her home dose of metoprolol and her tachycardia improved.  CTA negative for pulmonary embolism.  Called patient's granddaughter back regarding disposition.  Will come pick patient up.  Answered all questions for patient and family member prior to discharge, will DC with cardiology follow-up, strict return precautions discussed

## 2024-12-17 NOTE — ED ADULT NURSE NOTE - OBJECTIVE STATEMENT
Patient is a 79y female presenting to the ED via EMS from home with c/o palpitations. Patient A&Ox4 Patient is speaking coherently in full sentences. Patient reports having palpitations accompanied by chest tightness and shortness of breath starting this morning. Reports having a pacemaker placed at [facility] 1 week ago, is concerned the pacemaker is not working. Respirations spontaneous, even, and non-labored. ECG performed, patient placed on cardiac monitor. Abdomen soft, non-distended and non-tender upon palpation.

## 2024-12-18 VITALS
TEMPERATURE: 98 F | DIASTOLIC BLOOD PRESSURE: 62 MMHG | SYSTOLIC BLOOD PRESSURE: 117 MMHG | OXYGEN SATURATION: 96 % | HEART RATE: 72 BPM | RESPIRATION RATE: 16 BRPM

## 2024-12-20 NOTE — DISCHARGE NOTE NURSING/CASE MANAGEMENT/SOCIAL WORK - HISTORY OF COVID-19 VACCINATION
Dr. Perez-   Colonoscopy has been rescheduled to April. Patient states she can not move Bariatric surgery.   Thank you!  
Dr. Perez-   Patient is having bariatric surgery on 1/13/2025. Patient has a scheduled colonoscopy on 2/11/25. Patient wants to make sure it is ok to have colonoscopy as scheduled or if procedure should be rescheduled. Please advise.   Thank you!  
I would prefer if patient delayed her bariatric surgery until after c-scope is done. However, if patient wants to proceed with baritric surgery first, I would recommend rescheduling c-scope 3 months after her bariatric surgery to ensure she is properly healed.  
Patient has questions about the procedure please call   
Yes

## 2025-01-07 NOTE — ED PROVIDER NOTE - PRINCIPAL DIAGNOSIS
Medication: Triamcinolone 0.1%  Last office visit date: 6/17/24-2. Flexural eczema  Assessment & Plan:  Face, neck, bilateral arms. Suspect atopic over contact dermatitis. Desonide for face, triamcinolone for body. Stop all scented products, replace with unscented products. Wash makeup and hair products off before exercising. Zyrtec daily. Hydroxyzine at bedtime prn.  Orders:  -     triamcinolone (ARISTOCORT) 0.1 % ointment; Apply 1 Application topically in the morning and 1 Application in the evening. For skin on the body.  -     desonide (DESOWEN) 0.05 % cream; Apply 1 Application topically in the morning and 1 Application in the evening. To face.  -     Cetirizine HCl 10 MG Cap; Take 10 mg by mouth daily.  -     hydrOXYzine (ATARAX) 25 MG tablet; Take 1 tablet by mouth at bedtime as needed for Itching.  Medication Refill Protocol Failed.  Protocol approved due to: data identified in chart review.       Adult Topical Steroids Refill Protocol - 12 Month Protocol [id number]/2025 11:02 AM   Protocol Details Medication (including dose and sig) on current meds list        
Abdominal pain

## 2025-05-05 NOTE — DIETITIAN INITIAL EVALUATION ADULT. - CALCULATED TO (G/KG)
Reported results to patient. Requested medication pended for PCP approval.  Patient requests to be referred to Dr. Meagan Larson. Ok to refer per Dr Morrison verbal order.    62.52

## (undated) DEVICE — SENSOR O2 FINGER ADULT

## (undated) DEVICE — SNARE LOOP POLY DISP 30MM LOOP

## (undated) DEVICE — CONTAINER FORMALIN 10% 20ML

## (undated) DEVICE — SOL INJ NS 0.9% 500ML 1-PORT

## (undated) DEVICE — KIT ENDO PROCEDURE CUSTOM

## (undated) DEVICE — BALLOON US ENDO

## (undated) DEVICE — SYR LUER LOK 3CC

## (undated) DEVICE — SOLIDIFIER ISOLYZER 2000 CC

## (undated) DEVICE — SOL IRR POUR NS 0.9% 500ML

## (undated) DEVICE — TUBING IV SET GRAVITY 1Y 78" MACRO

## (undated) DEVICE — BITE BLOCK ADULT 20 X 27MM (GREEN)

## (undated) DEVICE — Device

## (undated) DEVICE — UNDERPAD LINEN SAVER 17 X 24"

## (undated) DEVICE — CLAMP BX HOT RAD JAW 3

## (undated) DEVICE — FORCEP BIOPSY 2.5MM DISP

## (undated) DEVICE — PACK IV START WITH CHG

## (undated) DEVICE — SALIVA EJECTOR (BLUE)

## (undated) DEVICE — DRSG CURITY GAUZE SPONGE 4 X 4" 12-PLY NON-STERILE

## (undated) DEVICE — RETRIEVER ROTH NET PLATINUM-UNIVERSAL

## (undated) DEVICE — LABELS BLANK W PEN

## (undated) DEVICE — BIOPSY FORCEP RADIAL JAW 4 STANDARD WITH NEEDLE

## (undated) DEVICE — TUBING MEDI-VAC W MAXIGRIP CONNECTORS 1/4"X6'

## (undated) DEVICE — ADAPTER ENDO CHNL SINGLE USE

## (undated) DEVICE — NDL HYPO SAFE 22G X 1" (BLACK)

## (undated) DEVICE — DRSG 2X2

## (undated) DEVICE — CATH IV SAFE BC 22G X 1" (BLUE)

## (undated) DEVICE — KIT ENDO PROCEDURE CUST W/VLV

## (undated) DEVICE — GOWN LG

## (undated) DEVICE — MASK OXYGEN PANORAMIC

## (undated) DEVICE — ANESTHESIA CIRCUIT ADULT HMEF

## (undated) DEVICE — VENODYNE/SCD SLEEVE CALF MEDIUM

## (undated) DEVICE — DENTURE CUP PINK

## (undated) DEVICE — SYR LUER LOK 50CC

## (undated) DEVICE — CATH ELCTR GLIDE PRB 7FR

## (undated) DEVICE — TUBING IV SET GRAVITY 3Y 100" MACRO

## (undated) DEVICE — NDL INJ SCLERO INTERJECT 23G

## (undated) DEVICE — STERIS DEFENDO 3-PIECE KIT (AIR/WATER, SUCTION & BIOPSY VALVES)

## (undated) DEVICE — DRSG CURITY GAUZE SPONGE 4 X 4" 12-PLY

## (undated) DEVICE — LUBRICATING JELLY ONESHOT 1.25OZ

## (undated) DEVICE — WARMING BLANKET FULL ADULT

## (undated) DEVICE — TUBING CANNULA SALTER LABS NASAL ADULT 7FT